# Patient Record
Sex: FEMALE | Race: WHITE | NOT HISPANIC OR LATINO | Employment: FULL TIME | URBAN - METROPOLITAN AREA
[De-identification: names, ages, dates, MRNs, and addresses within clinical notes are randomized per-mention and may not be internally consistent; named-entity substitution may affect disease eponyms.]

---
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
## 2020-01-06 ENCOUNTER — COMMUNICATION - HEALTHEAST (OUTPATIENT)
Dept: PEDIATRICS | Facility: CLINIC | Age: 20
End: 2020-01-06

## 2020-01-06 DIAGNOSIS — J45.901 ASTHMA EXACERBATION: ICD-10-CM

## 2020-11-16 ENCOUNTER — HEALTH MAINTENANCE LETTER (OUTPATIENT)
Age: 20
End: 2020-11-16

## 2021-05-24 ENCOUNTER — RECORDS - HEALTHEAST (OUTPATIENT)
Dept: ADMINISTRATIVE | Facility: CLINIC | Age: 21
End: 2021-05-24

## 2021-05-25 ENCOUNTER — RECORDS - HEALTHEAST (OUTPATIENT)
Dept: ADMINISTRATIVE | Facility: CLINIC | Age: 21
End: 2021-05-25

## 2021-05-29 NOTE — TELEPHONE ENCOUNTER
On Call BENI    Spoke with Dr Madison as patient's primary provider is Dr Nair--requesting her input for a safe antibiotic for Karen due to her history of chronic kidney disease. Reviewed UC result, recommended antibiotic options as outlined by the lab, patient's renal conditions, and creatinine result (1.10) in 8/2018.  She recommends cautious use of Levaquin and recommends I send a message to Dr Nair to follow up tomorrow.    As I Reviewed the chart further I noted hypermobility, conversion disorder, history of bilateral weakness and called Dr Madison back to discuss further as I have concerns with the prescribing Levaquin.  She recommends calling pharmacy to discuss.    I spoke with Jake Cade at the [facility] pharmacy.  Reviewed Karen's problem list/chart, UC results and medication recommendations.  He noted that the isolated bacteria is also susceptible to amoxicillin.  He recommends Augmentin 500 mg PO two times a day x 5 days.  He suggests that this medication should not cause an issue for her.  Also suggests follow up and repeating creatinine in the near future.    Rx ordered and will forward this message along to Dr Nair for her review.    I attempted to discuss the above with the patient, no answer via telephone.  Will send MyChart message    ABNER Lagunas CNM

## 2021-05-29 NOTE — PROGRESS NOTES
S: Karen presents for a placement check of her IUD. Her IUD was placed on 2019.  She has been busy with school and has not had time to come in until today.  She reports having tried to feel the strings without success.  Discussed checking the strings monthly and discussed positions that make reaching the strings a bit easier.  She has had spotting since her last period off and on but is not bothered by the amount of bleeding or length of time.  Reassurance provided that this is normal and will likely taper off.     Karen also feels she may have a UTI.  She has had   cloudy urine and incomplete bladder emptying sensations x 1/5 weeks. Denies dysuria and/or hematuria.  She had similar sx 6 months ago and received antibx from a walk in clinic.     O:/66 (Patient Site: Right Arm, Patient Position: Sitting, Cuff Size: Adult Regular)   Pulse 98   Wt 118 lb (53.5 kg)   SpO2 99%   BMI 19.64 kg/m       Patient appears well, in no apparent distress.       Pelvic exam: Vulva without lesions, erythema, or open areas.  Vaginal mucosa pink and rugated.  Cervix pink and smooth without polyps.  Able to visualize IUD strings that appear to be 2.5 cm in length. No abnormal vaginal discharge or foul odor present. No pelvic masses or CMT on bimanual exam.      A:  17 yo  here for IUD check  IUD in proper place  Sensation of incomplete bladder emptying     P:   -Check IUD strings at a designated time each month.  If unable to palpate strings patient to call for a CNM appointment   -UA  -UC  -Follow up prn or in approximately 6 months for annual well woman exam and prn     Total time spent with patient 20 minutes.  >50% of the time spent counseling and coordinating care.     Volodymyr LEVINE, BENI

## 2021-05-29 NOTE — TELEPHONE ENCOUNTER
Left message for patient to call back, offered appt with Dr. Nair 06/27 or Dr. Watson 06/28, both has openings available.

## 2021-05-30 VITALS — BODY MASS INDEX: 19.52 KG/M2 | WEIGHT: 115.52 LBS

## 2021-05-30 VITALS — HEIGHT: 65 IN | BODY MASS INDEX: 19.16 KG/M2 | WEIGHT: 115 LBS

## 2021-05-30 NOTE — TELEPHONE ENCOUNTER
Spoke with patient, relayed message, patient is scheduled to follow up with Dr. Nair 06/27 at 10:15am.

## 2021-05-30 NOTE — PROGRESS NOTES
ASSESSMENT:  1. Acute cystitis without hematuria             PLAN:  Patient Instructions   Finish antibiotic as prescribed    Probiotic may be helpful of you develop diarrhea from the antibiotic      Keep follow up with nephrology    Physical due in January        Return in about 6 months (around 12/27/2019), or if symptoms worsen or fail to improve, for Annual physical.    CHIEF COMPLAINT:  Chief Complaint   Patient presents with     Urinary Tract Infection     Follow up        HISTORY OF PRESENT ILLNESS:  Shelbi is a 18 y.o. female presenting to the clinic today evaluation post urinary tract infection.  She was last seen by [facility] 6/20/2019 for a placement check of her IUD and symptoms of possible urinary tract infection. She was prescribed 500 mg Augmentin 6/23/2019, to be taken twice daily for 5 days.     Urinary tract infection: She was seen by [facility] 6/20/2019. While there she reported sensation of incomplete bladder emptying and cloudy urine. She denies increased frequency or dysuria. Urine culture was positive for Staph saprophyticus. Today she endorses her symptoms are improving. She denies adverse effects from the antibiotics. She had a urinary tract infection approximately 6 months ago. She is scheduled to see nephrology over this summer.     REVIEW OF SYSTEMS:   She fractured her left hand while at work 5/28/2019 and got casted last week. She was seen at [facility] in [place].   Negative for fever, abdominal pain, emesis, and diarrhea.   All other systems are negative.    MEDICATIONS:  Current Outpatient Medications   Medication Sig Dispense Refill     albuterol (PROAIR HFA;PROVENTIL HFA;VENTOLIN HFA) 90 mcg/actuation inhaler Inhale 2 puffs every 6 (six) hours as needed for wheezing. 1 Inhaler 0     amoxicillin-clavulanate (AUGMENTIN) 500-125 mg per tablet Take 1 tablet (500 mg total) by mouth 2 (two) times a day for 5 days. 10 tablet 0     ascorbic acid  "(VITAMIN C) 250 MG tablet Take 250 mg by mouth daily.       cholecalciferol, vitamin D3, 1,000 unit tablet Take 1,000 Units by mouth daily.       FLUoxetine (PROZAC) 10 MG capsule TAKE 3 CAPSULES BY MOUTH ONCE DAILY 270 capsule 0     FLUoxetine (PROZAC) 40 MG capsule Take by mouth daily.  0     hydrOXYzine HCl (ATARAX) 10 MG tablet Take 10 mg by mouth daily as needed.  0     inhalational spacing device Spcr daily as needed. With inhaler       inulin (FIBER GUMMIES) 2.5 gram Chew Chew 2 tablets daily.  0     levonorgestrel (MIRENA) 20 mcg/24 hours (5 yrs) 52 mg IUD 1 each by Intrauterine route.       pediatric multivitamin (FLINTSTONES) Chew chewable tablet Chew 1 tablet daily.       riboflavin, vitamin B2, 100 mg Tab Take 100 mg by mouth daily.       No current facility-administered medications for this visit.          PFSH:  She just finished her first year of college at [employer]. She was on the prakash's list both semesters. She is taking two online classes over the summer. She had to switch roommates in the middle of the year and is happy with the change.   She is sexually active with a male partner and endorses using condoms. She denies any new sexual partners recently.     History of conversion disorder with recurrent episodes of paralysis, seizure-like activity. Has been doing very well, with no such symptoms at all lately.     Past Medical History:   Diagnosis Date     Depression      Exercise-induced asthma      Past Surgical History:   Procedure Laterality Date     KNEE ARTHROSCOPY W/ PLICA EXCISION Right 11/15/2013    Right lateral release and removal of plica, [facility], [place], [place]     TONSILLECTOMY Bilateral 07/19/2017         VITALS:  Vitals:    06/27/19 1018   BP: 108/68   Patient Site: Right Arm   Patient Position: Sitting   Cuff Size: Adult Small   Pulse: 82   Temp: 98.5  F (36.9  C)   TempSrc: Oral   SpO2: 99%   Weight: 119 lb 14.4 oz (54.4 kg)   Height: 5' 5\" (1.651 m)     Wt " Readings from Last 3 Encounters:   06/27/19 119 lb 14.4 oz (54.4 kg) (37 %, Z= -0.33)*   06/20/19 118 lb (53.5 kg) (33 %, Z= -0.44)*   01/21/19 114 lb (51.7 kg) (26 %, Z= -0.63)*     * Growth percentiles are based on [facility] (Girls, 2-20 Years) data.     Body mass index is 19.95 kg/m .    PHYSICAL EXAM:  General Appearance: Alert and no distress, appears stated age. Left arm thumb spica cast.   Head: Normocephalic, without obvious abnormality, atraumatic  Skin: Skin color, texture, turgor normal, no rashes or lesions  Neurologic:  Grossly normal    Results for orders placed or performed in visit on 06/20/19   Culture, Urine   Result Value Ref Range    Culture >100,000 col/ml Staphylococcus saprophyticus (!)    Urinalysis   Result Value Ref Range    Color, UA Yellow Colorless, Yellow, Straw, Light Yellow    Clarity, UA Clear Clear    Glucose, UA Negative Negative    Bilirubin, UA Negative Negative    Ketones, UA Negative Negative    Specific Gravity, UA 1.015 1.005 - 1.030    Blood, UA Moderate (!) Negative    pH, UA 6.0 5.0 - 8.0    Protein,  mg/dL (!) Negative mg/dL    Urobilinogen, UA 0.2 E.U./dL 0.2 E.U./dL, 1.0 E.U./dL    Nitrite, UA Negative Negative    Leukocytes, UA Small (!) Negative    Bacteria, UA Few (!) None Seen hpf    RBC, UA 5-10 (!) None Seen, 0-2 hpf    WBC, UA 5-10 (!) None Seen, 0-5 hpf    Squam Epithel, UA 0-5 None Seen, 0-5 lpf       ADDITIONAL HISTORY SUMMARIZED (2): [facility] and UTI symptoms reviewed.  DECISION TO OBTAIN EXTRA INFORMATION (1): None.   RADIOLOGY TESTS (1): None.  LABS (1): 6/20/2019 labs reviewed.  MEDICINE TESTS (1): None.  INDEPENDENT REVIEW (2 each): None.     Total data points: 3    The visit lasted a total of 10 minutes face to face with the patient. Over 50% of the time was spent counseling and educating the patient about urinary tract infection.    IApoorva am scribing for and in the presence of, Dr. Nair.    I,  Apoorva Nair, personally performed the services described in this documentation, as scribed by Apoorva Ralph in my presence, and it is both accurate and complete.

## 2021-05-30 NOTE — PATIENT INSTRUCTIONS - HE
Finish antibiotic as prescribed    Probiotic may be helpful of you develop diarrhea from the antibiotic      Keep follow up with nephrology    Physical due in January

## 2021-05-31 VITALS — WEIGHT: 115 LBS | HEIGHT: 65 IN | BODY MASS INDEX: 19.16 KG/M2

## 2021-05-31 VITALS — BODY MASS INDEX: 18.7 KG/M2 | HEIGHT: 65 IN | WEIGHT: 112.21 LBS

## 2021-05-31 VITALS — WEIGHT: 109.8 LBS | BODY MASS INDEX: 18.56 KG/M2

## 2021-06-02 VITALS — WEIGHT: 116 LBS | HEIGHT: 65 IN | BODY MASS INDEX: 19.33 KG/M2

## 2021-06-02 VITALS — BODY MASS INDEX: 18.97 KG/M2 | WEIGHT: 114 LBS

## 2021-06-02 VITALS — BODY MASS INDEX: 19.03 KG/M2 | WEIGHT: 114.2 LBS | HEIGHT: 65 IN

## 2021-06-02 NOTE — PROGRESS NOTES
"ASSESSMENT:  1. Convulsions, unspecified convulsion type (H)     2. Routine adult health maintenance  multivitamin (ONE A DAY) per tablet             PLAN:  Patient Instructions   Keep follow up with counselor and neurologist    Continue same meds    Call if symptoms worsen or recur.         Return in about 2 months (around 1/1/2020) for annual well child check.    CHIEF COMPLAINT:  Chief Complaint   Patient presents with     Follow-up     she is feeling better \"slowly\" but has been sleeping alot. last monday she got sick and has been very fatigued.       HISTORY OF PRESENT ILLNESS:  Shelbi is a 19 y.o. female presenting to the clinic today for follow up evaluation post ED visit yesterday. She is accompanied in clinic today by her mom.     She started to feel sick 5 days ago when she presented with chills. She thinks she had a fever but did not have a thermometer to measure her temperature. Additionally, she endorses pharyngitis, cough, headache, dizziness, and nausea. She went to the bathroom, 4 days ago, thinking she was going to vomit but instead lost consciousness. Once she lost consciousness, she had a seizure. Then, two days ago, she had a total of 8 seizures over the course of 12 hours. Each seizure was similar to previous seizures she had before. Last night, she had a seizure during the ambulance ride to [facility]. She was seen in the ED yesterday. She is unsure how many seizures she had yesterday. They reported to her that her seizures were lasting 10-15 seconds each with \"a lot of muscle movement.\" While in the ED, she received IV fluids, ativan, and decadron.     Today, she denies seizures and endorses feeling much better. When she was experiencing vertigo, it camron as if she was \"walking sideways.\" Now, she reports normal vision and denies dizziness. She feels as if her ears are plugged. She cannot remember the last time she was diagnosed with otitis media.    Mood: She currently takes " 60 mg fluoxetine daily. Additionally, she takes 10 mg hydroxyzine in the morning on weekdays. She denies drowsiness. Dr. Bunn at [facility] is prescribed these. She reports school is going well overall. She is currently stressed about one accounting class. She is not performing well in the course. She has a D and needs a C to pass. The course is intermediate accounting and a prerequisite for other courses that she needs take. She is taking steps to improve her grade.     She was last seen by the neurologist July 2018. She has not had trouble with migraines since starting B12. She adjusts her B12 dosage as needed for when she experiences migraines.     REVIEW OF SYSTEMS:   Negative for difficulty sleeping. She reports an average of 8-10 hours of sleep a night.   All other systems are negative.    MEDICATIONS:  Current Outpatient Medications   Medication Sig Dispense Refill     albuterol (PROAIR HFA;PROVENTIL HFA;VENTOLIN HFA) 90 mcg/actuation inhaler Inhale 2 puffs every 6 (six) hours as needed for wheezing. 1 Inhaler 0     ascorbic acid (VITAMIN C) 250 MG tablet Take 250 mg by mouth daily.       cholecalciferol, vitamin D3, 1,000 unit tablet Take 1,000 Units by mouth daily.       FLUoxetine (PROZAC) 20 MG capsule Take 60 mg by mouth.       FLUoxetine (PROZAC) 40 MG capsule Take by mouth daily.  0     hydrOXYzine HCl (ATARAX) 10 MG tablet Take 10 mg by mouth daily as needed.  0     inulin (FIBER GUMMIES) 2.5 gram Chew Chew 2 tablets daily.  0     meclizine (ANTIVERT) 12.5 mg tablet Take 25 mg by mouth.       riboflavin, vitamin B2, 100 mg Tab Take 100 mg by mouth daily.       inhalational spacing device Spcr daily as needed. With inhaler       levonorgestrel (MIRENA) 20 mcg/24 hours (5 yrs) 52 mg IUD 1 each by Intrauterine route.       multivitamin (ONE A DAY) per tablet Take 1 tablet by mouth daily.  0     No current facility-administered medications for this visit.          PFSH:  She lives with one roommate  "in a dorm at school. She enjoys college and is studying accounting. She does not plan on living with a roommate next year.     Past Medical History:   Diagnosis Date     Depression      Exercise-induced asthma      Past Surgical History:   Procedure Laterality Date     KNEE ARTHROSCOPY W/ PLICA EXCISION Right 11/15/2013    Right lateral release and removal of plica, [facility], [place], [place]     TONSILLECTOMY Bilateral 07/19/2017         VITALS:  Vitals:    11/01/19 1407   BP: 112/70   Patient Site: Right Arm   Patient Position: Sitting   Cuff Size: Adult Regular   Weight: 127 lb 4.8 oz (57.7 kg)   Height: 5' 4.57\" (1.64 m)     Wt Readings from Last 3 Encounters:   11/01/19 127 lb 4.8 oz (57.7 kg) (50 %, Z= 0.01)*   06/27/19 119 lb 14.4 oz (54.4 kg) (37 %, Z= -0.33)*   06/20/19 118 lb (53.5 kg) (33 %, Z= -0.44)*     * Growth percentiles are based on CDC (Girls, 2-20 Years) data.     Body mass index is 21.47 kg/m .    PHYSICAL EXAM:  General Appearance: Alert and no distress, appears stated age.  Head: Normocephalic, without obvious abnormality, atraumatic  Eyes: PERRL, conjunctiva/corneas clear  Ears: Normal TM's and external ear canals, both ears  Nose: Nares normal. Slight nasal congestion.   Throat: Moist mucosa, post pharynx clear  Neck: Supple, no adenopathy  Lungs: Clear to auscultation bilaterally, no crackles or wheeze, no increased work of breathing  Heart: Regular rate and rhythm, S1 and S2 normal, no murmur, rub   or gallop  Abdomen: Soft, non tender, non distended   Skin: Skin color, texture, turgor normal, no rashes or lesions  Neurologic:  Grossly normal    QUALITY MEASURES:  The following are part of a depression follow up plan for the patient:  under care of mental health counselor and patient follow-up to return when and if necessary    ADDITIONAL HISTORY SUMMARIZED (2): 10/31/19 [facility] note regarding seizures reviewed.  DECISION TO OBTAIN EXTRA INFORMATION (1): None. "   RADIOLOGY TESTS (1): None.  LABS (1): 10/31/19 labs reviewed.  MEDICINE TESTS (1): None.  INDEPENDENT REVIEW (2 each): None.     Total data points: 3    The visit lasted a total of 23 minutes face to face with the patient. Over 50% of the time was spent counseling and educating the patient about recent hospital visit and seizures.    IApoorva am scribing for and in the presence of, Dr. Nair.    I, Apoorva Nair, personally performed the services described in this documentation, as scribed by Apoorva Ralph in my presence, and it is both accurate and complete.

## 2021-06-03 VITALS
DIASTOLIC BLOOD PRESSURE: 70 MMHG | BODY MASS INDEX: 21.21 KG/M2 | WEIGHT: 127.3 LBS | SYSTOLIC BLOOD PRESSURE: 112 MMHG | HEIGHT: 65 IN

## 2021-06-03 VITALS — BODY MASS INDEX: 19.98 KG/M2 | HEIGHT: 65 IN | WEIGHT: 119.9 LBS

## 2021-06-03 VITALS — WEIGHT: 118 LBS | BODY MASS INDEX: 19.64 KG/M2

## 2021-06-03 NOTE — PATIENT INSTRUCTIONS - HE
Keep follow up with counselor and neurologist    Continue same meds    Call if symptoms worsen or recur.

## 2021-06-04 NOTE — TELEPHONE ENCOUNTER
Patient mother states they are going vacation out of country and leaving Wednesday morning requesting to process as soon as possible.  Refill Request  Did you contact pharmacy: No  Medication name:   Requested Prescriptions     Pending Prescriptions Disp Refills     albuterol (PROAIR HFA;PROVENTIL HFA;VENTOLIN HFA) 90 mcg/actuation inhaler 1 Inhaler 0     Sig: Inhale 2 puffs every 6 (six) hours as needed for wheezing.   Who prescribed the medication: Apoorva Nair MD  Pharmacy Name and Location: [facility] # [id number]  Is patient out of medication: Yes  Patient notified refills processed in 72 hours:  yes  Okay to leave a detailed message: no

## 2021-06-05 NOTE — TELEPHONE ENCOUNTER
Refill Given  Physical due in January  Return in about 6 months (around 12/27/2019), or if symptoms worsen or fail to improve, for Annual physical.    Refill given per Policy, patient informed they are overdue for Labs and Physical     Rose Rivera, [facility] Connection Triage/Med Refill 1/6/2020    Requested Prescriptions   Pending Prescriptions Disp Refills     albuterol (PROAIR HFA;PROVENTIL HFA;VENTOLIN HFA) 90 mcg/actuation inhaler 1 Inhaler 0     Sig: Inhale 2 puffs every 6 (six) hours as needed for wheezing.       Albuterol/Levalbuterol Refill Protocol Passed - 1/6/2020 10:28 AM        Passed - PCP or prescribing provider visit in last year     Last office visit with prescriber/PCP: 11/1/2019 Apoorva Nair MD OR same dept: 11/1/2019 Apoorva Nair MD OR same specialty: 11/1/2019 Apoorva Nair MD Last physical: 1/4/2019       Next appt within 3 mo: Visit date not found  Next physical within 3 mo: Visit date not found  Prescriber OR PCP: Apoorva Nair MD  Last diagnosis associated with med order: There are no diagnoses linked to this encounter.  If protocol passes may refill for 6 months if within 3 months of last provider visit (or a total of 9 months). If patient requesting >1 inhaler per month refill x 6 months and have patient make appointment with provider.

## 2021-06-10 NOTE — PROGRESS NOTES
Name: Shelbi Johnson  Age: 16 y.o.  Gender: female  : 2000  Date of Encounter: 5/3/2017      Chief Complaint   Patient presents with     Sore Throat     on and off for 5 weeks, Pt has been tested for strep and mono both are neg       HPI:  Shelbi Johnson is a 16 y.o. old female who presents to the clinic with mom for evaluation of sore throat  Sore throat for a few weeks  She gets bad sore throat several times per year.  Strep tests are usually negative.  Associated with fever to just over 100.2  She has been taking chewable tylenol for pain  Ears feel plugged.  New pain on right side of neck today  Seen at [facility] yesterday, strep negative    ROS:  No cough  No rhinorrhea  No headache  Some nausea  No vomiting    PMH:  Chronic kidney disease, followed by Dr. Joy    Social:  No known specific exposure    Objective:  Vitals: Temp 97.9  F (36.6  C) (Temporal)   Wt 115 lb 8.3 oz (52.4 kg)    Gen: Alert, awake, well appearing  Head: Normocephalic with age appropriate fontanelles.  Eyes: Red reflex present bilaterally. Pupils equally round and reactive to light. Conjunctivae and cornea clear  Ears: Right TM clear.  Left TM clear   Nose:  no rhinorrhea.  Throat:  Oropharynx clear.  Tonsils 3+ and erythematous with exudate bilaterally  Neck: Supple.  No adenopathy.  Heart: Regular rate and rhythm; normal S1 and S2; no murmurs, gallops, or rubs.  Lungs: Unlabored respirations; symmetric chest expansion; clear breath sounds.  Abdomen: Soft, without organomegaly. Bowel sounds normal. Nontender without rebound. No masses palpable. No distention.  Genitalia: deferred  Extremities: No clubbing, cyanosis, or edema. Normal upper and lower extremities.  Skin: Clear  Mental Status: Alert, oriented, in no distress. Appropriate for age.  Neuro: Normal reflexes; normal tone; no focal deficits appreciated. Appropriate for age.    Pertinent results / imaging:  Reviewed   Results for orders placed or performed in  visit on 05/03/17   Mononucleosis Screen   Result Value Ref Range    Mono Screen Positive (!) Negative       Assessment and Plan:    1. Infectious mononucleosis without complication, infectious mononucleosis due to unspecified organism  Mononucleosis Screen    Ambulatory referral to ENT       Patient Instructions   Call [phone] to schedule consultation with ENT for possible tonsillectomy.    Prednisolone as prescribed for symptom relief.    Tylenol on a scheduled basis every 6 hours.  Ice chips, sip of cool water.  Gargling with warm salt water as your throat feels slightly better should help soothe as well.    Call if unable to swallow liquids or saliva, severe pain not responding to above measure.                Naveen Murphy MD  5/3/2017

## 2021-06-11 NOTE — PROGRESS NOTES
ASSESSMENT & PLAN:  1. Preop examination    - Pregnancy (Beta-hCG, Qual), Urine  - Hemoglobin    2. Recurrent tonsillitis    - Pregnancy (Beta-hCG, Qual), Urine  - Hemoglobin    3. Dysmenorrhea    - Pregnancy (Beta-hCG, Qual), Urine  - Hemoglobin        Patient Instructions   I will fax your results to the surgery center and I will mail a copy to your home. Bring your copy to the surgery.     Start a new birth control pill after surgery. I will send to your pharmacy of choice.       Orders Placed This Encounter   Procedures     Pregnancy (Beta-hCG, Qual), Urine     Hemoglobin     There are no discontinued medications.    No Follow-up on file.    CHIEF COMPLAINT:  Chief Complaint   Patient presents with     Pre-op Exam     Tonsilectomy, 7/19, [facility] surgery center, Dr. Cool        HISTORY OF PRESENT ILLNESS:  Shelbi is a 16 y.o. female here for an internal medicine pre-operative consultation. The exam is requested by Dr. Cool in preparation for tonsillectomy  to be performed at [facility] on 7/19/2017. Today s examination on 06/29/17 is done to review the underlying surgical condition of tonsillitis, clear for anesthesia, and review medical problems with appropriate changes in medications.    She was seen by Dr. Cool on 5/24/2017 for an evaluation of her history of sore throat beginning over 10 years ago. She has been getting repeated episodes of tonsillitis that wasn't strep. She was diagnosed with Mono on 5/3/2017.      Shelbi has tolerated previous surgeries well without bleeding or anesthesia difficulty. She has had plica removal and releases. Maternal Grandmother and Mother have history of hypotension with anesthesia. She denies known exposure to measles and whooping cough.     Contraception: She notes that her cramps and headaches during her menstrual periods are worsening and she is inquiring about birth control to control her symptoms. Her first menstrual period was when she was 12-13 years  old. Her last LMP was 5/24/2017. She was previously taking Ortho-Cyclen but discontinued about 2 months later due to side effects of spotting and no improvement of her cramps.     REVIEW OF SYSTEMS:   She also notes a history of conversion disorder.  All other systems are negative.    PFSH:  She is watching How I Met your Mother and Switched at Birth on Netflix. She is reading [employer] books over the summer. She will be a senior at [employer]. She wants to go to [employer] for accounting.   History   Smoking Status     Never Smoker   Smokeless Tobacco     Not on file       Family History   Problem Relation Age of Onset     Depression Mother      Hyperlipidemia Mother      Osteoarthritis Mother      Other Mother      Spinal stenosis     No Medical Problems Father      No Medical Problems Brother      Other Maternal Grandmother      Spinal stenosis     Paget's disease of bone Maternal Grandmother      Cardiomyopathy Maternal Grandmother      viral     Colon cancer Maternal Grandmother      Unsure if mass was cancerous or not      Alzheimer's disease Maternal Grandfather      Hypertension Maternal Grandfather      Hyperlipidemia Maternal Grandfather      Breast cancer Paternal Grandmother      Stroke Paternal Grandfather        Past Surgical History:   Procedure Laterality Date     KNEE ARTHROSCOPY W/ PLICA EXCISION Right 2013       Allergies   Allergen Reactions     Nsaids (Non-Steroidal Anti-Inflammatory Drug)      Pt has kidney concerns, cannot take NSAIDs per pt       Active Ambulatory Problems     Diagnosis Date Noted     Renal Medullary Necrosis      Intermittent Asthma      Chronic Kidney Disease, Stage 2      Type IV Renal Tubular Acidosis      Hypermobility Syndrome      Fasciculations      Transient Tic Disorder      Acne      Nasal Passage Blockage (Stuffiness)      Allergic Reaction      Conversion disorder 12/30/2014     Dysmenorrhea 06/14/2016     Migraine headache without aura 06/17/2016  "    Resolved Ambulatory Problems     Diagnosis Date Noted     Chronic Renal Insufficiency      Vitamin D Deficiency      Allergy To Milk      Past Medical History:   Diagnosis Date     Chronic kidney disease      Depression      Exercise-induced asthma        VITALS:  Vitals:    06/27/17 1601   BP: 98/64   Pulse: 78   Weight: 115 lb (52.2 kg)   Height: 5' 4.75\" (1.645 m)     Wt Readings from Last 3 Encounters:   06/27/17 115 lb (52.2 kg) (36 %, Z= -0.36)*   05/24/17 115 lb (52.2 kg) (37 %, Z= -0.34)*   05/03/17 115 lb 8.3 oz (52.4 kg) (38 %, Z= -0.30)*     * Growth percentiles are based on CDC 2-20 Years data.     Body mass index is 19.29 kg/(m^2).    PHYSICAL EXAM:  Constitutional: She appears well-developed and well-nourished.   HEENT: Head: Normocephalic.    Right Ear: Tympanic membrane, external ear and canal normal.    Left Ear: Tympanic membrane, external ear and canal normal.    Nose: Nose normal.    Mouth/Throat: Mucous membranes are moist. Oropharynx is clear. +2 tonsils bilaterally.    Eyes: Conjunctivae and lids are normal. Pupils are equal, round, and reactive to light. Optic discs are sharp.   Neck: Neck supple. No tenderness is present.   Cardiovascular: Normal rate and regular rhythm. No murmur heard.  Pulses: Femoral pulses are 2+ bilaterally.   Pulmonary/Chest: Effort normal and breath sounds normal. There is normal air entry. Breast development is normal.    Abdominal: Soft. There is no hepatosplenomegaly. No inguinal hernia.   Musculoskeletal: Normal range of motion. Normal strength and tone. No abnormalities. Spine is straight. Normal duck walk.  Normal heel to toe walk.   : Deferred.   Neurological: She is alert. She has normal reflexes. Gait normal.   Psychiatric: She has a normal mood and affect. Her speech is normal and behavior is normal.  Skin: Clear. No rashes.     Results for orders placed or performed in visit on 06/27/17   Pregnancy (Beta-hCG, Qual), Urine   Result Value Ref Range    " Pregnancy Test, Urine Negative Negative    Specific Gravity, UA 1.005 1.001 - 1.030   Hemoglobin   Result Value Ref Range    Hemoglobin 14.3 12.0 - 16.0 g/dL         ADDITIONAL HISTORY SUMMARIZED (2):Reviewed Dr. Cool' note from 5/24/2017 regarding frequent sore throat. Reviewed Dr. Murphy's note from 5/3/2017 regarding mono.   DECISION TO OBTAIN EXTRA INFORMATION (1): None.   RADIOLOGY TESTS (1): None.  LABS (1): Labs ordered.   MEDICINE TESTS (1): None.  INDEPENDENT REVIEW (2 each): None.     The visit lasted a total of 23 minutes face to face with the patient. Over 50% of the time was spent counseling and educating the patient about pre-op.    I, Mel Randall, am scribing for and in the presence of, Dr. Nair.    I, Dr. Apoorva Nair, personally performed the services described in this documentation, as scribed by Mel Randall in my presence, and it is both accurate and complete.    MEDICATIONS:  Current Outpatient Prescriptions   Medication Sig Dispense Refill     albuterol (PROVENTIL HFA;VENTOLIN HFA) 90 mcg/actuation inhaler Inhale 2 puffs every 6 (six) hours as needed for wheezing.       ascorbic acid (VITAMIN C) 250 MG tablet Take 250 mg by mouth daily.       inhalational spacing device Spcr daily as needed. With inhaler       inulin (FIBER GUMMIES) 2.5 gram Chew Chew 2 tablets daily.  0     pediatric multivitamin (FLINTSTONES) Chew chewable tablet Chew 1 tablet daily.       levonorgestrel-ethinyl estradiol (ORSYTHIA) 0.1-20 mg-mcg per tablet Take 1 tablet by mouth daily. 28 tablet 3     No current facility-administered medications for this visit.        Total data points:3    Apoorva Nair MD

## 2021-06-13 NOTE — PROGRESS NOTES
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 Mark Gormley  Visit Diagnosis:     ICD-10-CM    1. Weakness of both lower extremities R29.898    2. Decreased functional mobility and endurance Z74.09    3. Poor tolerance for activity Z78.9          Assessment:   Patient returns for first PT follow up. She has been compliant with ex's given at evaluation. She displays slightly improved quad/hip flexor strength with increased heigh of SLR. Sit<>stand was initiated today; pt was able to stand using B UE for support and cues for LE engagement although was unable to achieve upright posture despite cues.   Patient is benefitting from skilled physical therapy and is making steady progress toward functional goals.  Patient is appropriate to continue with skilled physical therapy intervention, as indicated by initial plan of care.    Goal Status:  Pt. will be independent with home exercise program in : 2 weeks  Pt will: return to driving independently 7/7 days/week for 1 week in 12 weeks  Pt will: return to walking without AD without visible gait pattern to return to normal school function in 12 weeks  Pt will: ascend/descend 1 flight of stairs without HR with reciprocal pattern 7/7 days/week for 1 week to ascend stairs in her home in 12 weeks  Pt will: perform all transfers independently without assistance or cues to return to normal daily activities in 12 weeks    Plan / Patient Education:     Continue with initial plan of care.  Progress with home program as tolerated.    Subjective:   Patient reports she had a \"long weekend\". She went to her high school football game but was unable to watch the game due to her view being blocked by other students. She is accompanied by her father to appt today and presents in .   She has not been able to " contact [facility] regarding inpatient program. She will be seeing psychology on Wednesday. Pt also mentions her parents have started a 504 plan for her in school.    Objective:     CGA sit<>stand, pt able to use B UE on //. Cues for weight shift forward and using LE to push to stand with slow eccentric lowering back into chair.   Pt independent in transfer from <>Nu-step.     Treatment Today     TREATMENT MINUTES COMMENTS   Evaluation     Self-care/ Home management     Manual therapy     Neuromuscular Re-education     Therapeutic Activity     Therapeutic Exercises 23 Nu-step WL 5, 5:00 total.    Reviewed and advanced HEP:  Exercise #2: 4 way SLR: supine, sidelying abd and add, prone glut ext  Comment #2: HEP x 10 reps B  Exercise #3: Ab set with breathing  Comment #3: HEP x 10 seconds x 15-20 rep  Exercise #4: Bridges  Comment #4: HEP x 5 seconds x 10 reps  Exercise #5: Clamshell  Comment #5: HEP to fatigue B       Gait training 5 ambulation in // with B UE support x 10 feet x 3 reps, cues for posture and reciprocal walking pattern.    Modality__________________                Total 28    Blank areas are intentional and mean the treatment did not include these items.       Talya Mcneal  10/16/2017

## 2021-06-13 NOTE — PROGRESS NOTES
"[facility]   Balance Initial Evaluation    Patient Name: Shelbi Johnson  Date of evaluation: 10/13/2017  Referral Diagnosis: Somatoform disorder   Referring provider: Apoorva Nair MD Mark Gormley, MD, [facility]  Visit Diagnosis:     ICD-10-CM    1. Weakness of both lower extremities R29.898    2. Decreased functional mobility and endurance Z74.09    3. Poor tolerance for activity Z78.9        Assessment:   Shelbi Johnson is a 17 y.o. female who presents to therapy today with chief complaints of bilateral lower extremity weakness and paresthesias since October 7th 2017. Patient states she was at a friends house when she suddenly felt \"paralyzed\", as she was unable to stand and walk. Patient has a previous history of conversion disorder in 2014, treated with outpatient physical therapy and eventually an inpatient rehabilitation at [facility]. Patient presents today in a wheelchair and is unable to stand and walk independently. MMT does reveal moderate strength in lower extremities, however strength is incosistent. Sensation testing does not follow dermatomal pattern. Patient was previously active and independent in all transfers, ambulation, ADL's. Pt is currently a high school student using a wheelchair for transportation. She will benefit from skilled 1:1 PT services to improve strength, transfers and return to independent and normal age related activities.     Goals:  Pt. will be independent with home exercise program in : 2 weeks  Pt will: return to driving independently 7/7 days/week for 1 week in 12 weeks  Pt will: return to walking without AD without visible gait pattern to return to normal school function in 12 weeks  Pt will: ascend/descend 1 flight of stairs without HR with reciprocal pattern 7/7 days/week for 1 week to ascend stairs in her home in 12 weeks  Pt will: perform all transfers independently without assistance or cues to return to normal daily " "activities in 12 weeks    Patient's expectations/goals are realistic.    Barriers to Learning or Achieving Goals:  Mental illness or emotional factors.  previous history of conversion disorder, hx of anxiety and depression may limit overall progress.       Plan / Patient Instructions:        Plan of Care:   Authorization / Certification Start Date: 10/13/17  Authorization / Certification Number of Visits: up to 12-16  visits   Communication with: Referral Source  Patient Related Instruction: Nature of Condition;Treatment plan and rationale;Self Care instruction;Basis of treatment  Times per Week: 2-3x/week per MD  Number of Weeks: up to 12 weeks  Number of Visits: up to 12-16 visits   Therapeutic Exercise: ROM;Stretching;Strengthening  Neuromuscular Reeducation: kinesio tape;posture;balance/proprioception;TNE  Manual Therapy: soft tissue mobilization;myofascial release;joint mobilization;muscle energy  Other Plan #1: Therapeutic activity     Plan for next visit: initiate core strengthening, progress as able. Consider nu-step or bike next session.     Subjective:        Karen Johnson is a 18 y/o female who presents today with chief c/o bilateral lower extremity weakness and paresthesias since October 7th 2017. Patient states she was at a friends house when she suddenly felt \"paralyzed\", as she was unable to stand and walk. Her mother picked her up from her friends house and rented a wheelchair to transport her. Patient's mother attends appointment today. Patient went to [facility] and eventually was seen at [facility] physical medicine and was referred to physical therapy. Pt will also be seeing psychology next week.    Patient has a previous history of conversion disorder in 2014, treated with outpatient physical therapy and eventually an inpatient rehabilitation at [facility]. Patient was admitted for approximately 2 weeks while during inpatient. Pt reports she continued experiencing " "paralysis symptoms off/on for 1 year after she was discharged from [place]. She is unable to recall any specific triggers for her symptoms. She states she does not feel \"stressed\", but wonders if she is nervous about attending college at [employer] next year.     Patient states she is unable to move her legs and has to lift her legs for tranfers. She reports she has no feeling except for \"pressure\" in her legs and tingling in the bottom of her feet. She is occasionally able to wiggle her toes.      Patient goals: \"regain mobility in my legs\", walking, return driving     Diagnosis: Somatoform disorder   Date of diagnosis: 10/9/17  Past medical history/precautions: previous Hx of conversion disorder, anxiety, currently on anti-depressant, 1 panic attack last year but none since    Living Situation: pt lives with her mother, father, brother. She is a senior in high school, has a boyfriend.   Equipment: owns [employer], renting [employer]  Prior Functional Status: previously following an exercise program, no AD, no sporting activities   Current Activity Level: not currently exercising     Fall history: none     Pain  Pain Ratin  Pain rating at best: 0  Pain rating at worst: 0     Objective:      Note: Items left blank indicates the item was not performed or not indicated at the time of the evaluation.    Balance Examination  1. Weakness of both lower extremities     2. Decreased functional mobility and endurance     3. Poor tolerance for activity       Involved side: Bilateral    Posture Observation: PPT, presents in wheelchair   Assistive Device: presents in a wheelchair    Transitional movements:          Sit?Stand:  Modified Independent uses B UE for support to stand, unable to stand without UE support   W/C?Mat/Bed: Modified Independent patient lifts B LE with her hands  Sit? Supine:  Independent    Supine? Sit:   Independent   W/C? Car:  Modified Independent    Floor? Stand:  Not Tested     Strength    " "Strength   L / R ROM   L / R Comments   Seated Hip Flexion 4+/4+ WNL      Knee Extension 4/4 WNL Able to hold contraction; B LE shaking present    Dorsi Flexion 1/1 WNL Palpable mm contraction, no active movement                 Supine Straight Leg Raise (degrees) Pt performs SLR with extensor lag present, lifts 4\" from table surface      Quad activation Moderate quad activation present, improves with more repetitions             Side lying Hip Abduction 4/4      Hip Adduction NT            Prone Hip Extension 4-/4-      Knee Flexion 3+/4            Standing Plantar Flexion*       Dorsi Flexion        *Standing PF (single heel raise with UE support for balance only):  5= 16-20 heel raises  4= 10-15 heel raises  3= 5-9 heel raises  2= 1-4 heel raises      Sensation testing:  Sharp sensation testing B LE dermatomes:  L1: absent B  L2: WNL B  L3: L absent, R WNL  L4: L absent, R WNL  L5: R absent, L WNL      Timed Up and Go (TUG): NT  TUG Motor:  seconds  TUG Cognitive:  seconds  Task:     30 second sit<>stand:  reps  UE support?   Age gender norm:     Balance:    Firm Surface (seconds) Unstable Surface (seconds)    EO EC EO EC   Romberg (feet together)       Sharpened Romberg (tandem)       Semi-Romberg (small step)           Gait observation:  Patient ambulates in // with B UE support and CGA from therapist. Pt able to ambulate with approximately 50% WB on B LE, advancing limbs independently, B foot internally rotated slightly. Pt states \"I feel like I'm going to fall\" at the end of 20 feet.           Treatment Today     TREATMENT MINUTES COMMENTS   Evaluation 30 LE evaluation.    Self-care/ Home management     Manual therapy     Neuromuscular Re-education     Therapeutic Activity     Therapeutic Exercises 25 Initiated HEP:    Exercises:  Exercise #1: Isometrics: To be performed hourly, sitting or supine: glut set, quad set, abd, add in hooklying  Comment #1: HEP x 5 seconds x 10 reps  Exercise #2: 4 way SLR: supine, " sidelying abd and add, prone glut ext  Comment #2: HEP x 10 reps B    Discussed importance of HEP compliance- educated on how inactivity relates to weakness. Discussed goals of physical therapy to return to full strength and mobility. Educated on role of psychology for treatment of conversion disorder.    Gait training     Modality__________________                Total 55    Blank areas are intentional and mean the treatment did not include these items.     PT Evaluation Code: (Please list factors)  Patient History/Comorbidities: CKD stage II, hypermobility syndrome, anxiety  Examination: see above  Clinical Presentation: unstable  Clinical Decision Making: moderate    Patient History/  Comorbidities Examination  (body structures and functions, activity limitations, and/or participation restrictions) Clinical Presentation Clinical Decision Making (Complexity)   No documented Comorbidities or personal factors 1-2 Elements Stable and/or uncomplicated Low   1-2 documented comorbidities or personal factor 3 Elements Evolving clinical presentation with changing characteristics Moderate   3-4 documented comorbidities or personal factors 4 or more Unstable and unpredictable High Talya Mcneal  10/13/2017  7:53 AM

## 2021-06-13 NOTE — PROGRESS NOTES
[facility] Daily Progress     Patient Name: Shelbi Johnson  Date: 10/18/2017  Visit #: 3/12-16 visits - [facility]  [facility] visit #:    Referral Diagnosis: Somatoform disorder   Referring provider: Apoorva Nair MD Dr. Mark Gormley  Visit Diagnosis:     ICD-10-CM    1. Weakness of both lower extremities R29.898    2. Decreased functional mobility and endurance Z74.09    3. Poor tolerance for activity Z78.9          Assessment:   Patient is making progress towards goals- she was able to ambulate today using standard walker up for >60 feet before requesting seated rest break due to UE fatigued. Encouraged patient and her mother to have patient walk at home and perform all possible ADL's independently. Patient's mother expresses concern her daughter will fall. Discussed patient's balance and posture reactions are intact and patient should be able to correct her balance independently when needed.   Patient is benefitting from skilled physical therapy and is making steady progress toward functional goals.  Patient is appropriate to continue with skilled physical therapy intervention, as indicated by initial plan of care.    Goal Status:  Pt. will be independent with home exercise program in : 2 weeks  Pt will: return to driving independently 7/7 days/week for 1 week in 12 weeks  Pt will: return to walking without AD without visible gait pattern to return to normal school function in 12 weeks  Pt will: ascend/descend 1 flight of stairs without HR with reciprocal pattern 7/7 days/week for 1 week to ascend stairs in her home in 12 weeks  Pt will: perform all transfers independently without assistance or cues to return to normal daily activities in 12 weeks    Plan / Patient Education:     Continue with initial plan of care.  Progress with home program as tolerated.    Subjective:   Patient reports she found a walker at home and walked approximately 16 feet. Pt started psychology today; NABILA was given for  "communication between psychologist and the author of this note.     Objective:     Ambulates today with standard walker, decreased step length- unable to pass stance foot with swing LE. Pt using approximately 50% body weight through UE on walker. Cued patient for heel strike and increasing stride length which improved gait speed slightly.   Patient's mother requests to follow patient with WC.      Treatment Today     TREATMENT MINUTES COMMENTS   Evaluation     Self-care/ Home management     Manual therapy     Neuromuscular Re-education     Therapeutic Activity     Therapeutic Exercises 15 Nu-step WL 5, 5:00 total.    Performed the following in // with B UE support:  Standing hip abduction x 10 reps B  Standing heel raises with minimal height x 10 B  Toe taps on 3\" step x 10 reps B, alternating.   Pt fatigues following these 3 exercises, stating \"my legs are so tired and shaky\".   Cues for abdominal activation provided throughout.        Gait training 15 Ambulation with standard walker x 44.3 feet with 2 minute rest due to c/o UE fatigue. Followed with ambulation for additional 62.7 feet.      Modality__________________                Total 30    Blank areas are intentional and mean the treatment did not include these items.       Talya Mcneal  10/18/2017  "

## 2021-06-13 NOTE — PROGRESS NOTES
"[facility] Daily Progress     Patient Name: Shelbi Johnson  Date: 11/1/2017  Visit #: 9/12-16 visits - [facility] visit #:    Referral Diagnosis: Somatoform disorder   Referring provider: Apoorva Nair MD Dr. Mark Gormley  Visit Diagnosis:     ICD-10-CM    1. Decreased functional mobility and endurance Z74.09    2. Weakness of both lower extremities R29.898    3. Poor tolerance for activity Z78.9          Assessment:   Patient's ambulation and overall function drastically improved throughout session today. Pt presented in WC and was able to ambulate without gait deviation throughout most of session. Although this does demonstrate progress, patient's progress is still very inconsistent from day to day. Patient displays improved affect today; overall less c/o feeling \"tired\".   Patient is benefitting from skilled physical therapy and is making steady progress toward functional goals.  Patient is appropriate to continue with skilled physical therapy intervention, as indicated by initial plan of care.    Goal Status:  Pt. will be independent with home exercise program in : 2 weeks  Pt will: return to driving independently 7/7 days/week for 1 week in 12 weeks  Pt will: return to walking without AD without visible gait pattern to return to normal school function in 12 weeks  Pt will: ascend/descend 1 flight of stairs without HR with reciprocal pattern 7/7 days/week for 1 week to ascend stairs in her home in 12 weeks  Pt will: perform all transfers independently without assistance or cues to return to normal daily activities in 12 weeks    Plan / Patient Education:     Continue with initial plan of care.  Progress with home program as tolerated.    Subjective:   Patient reports no significant changes since last visit; walking is about the \"same\". Patient and mother express frustration Filipp[facility] has not returned phone calls regarding potential inpatient stay.    Objective:     Pt presents today in WC. " Leaves appointment today with normal gait pattern. Pt able to ambulate with normal gait pattern throughout entire session after using Nu-step for warm up.     Treatment Today     TREATMENT MINUTES COMMENTS   Evaluation     Self-care/ Home management     Manual therapy     Neuromuscular Re-education     Therapeutic Activity     Therapeutic Exercises 25 Nu-step WL 1, LE only x 7:00 total.     Amb drills on white 20 foot line:   - high knee steps x 20 feet x 2 reps F/B, cues for driving knee and increasing ankle DF, with reciprocal arm swing F/B  - cross overs x 20 feet x 2 reps F/B    Sit<> stand with B UE support x 10 reps, cues for glut activation and posture upon standing; good demo today.       Gait training 10     Treadmill walking without UE support:  1.5 mph x 2:00 warm up comfortable speed  1.8 mph x 1:00   2.0 mph x 5:00 with head turns up/down, right/left without UE support      Modality__________________                Total 35    Blank areas are intentional and mean the treatment did not include these items.       Talya Mcneal  11/1/2017

## 2021-06-13 NOTE — PROGRESS NOTES
"[facility] Daily Progress     Patient Name: Shelbi Johnson  Date: 10/30/2017  Visit #: 8/12-16 visits - [facility] visit #:    Referral Diagnosis: Somatoform disorder   Referring provider: Apoorva Nair MD Dr. Mark Gormley  Visit Diagnosis:     ICD-10-CM    1. Decreased functional mobility and endurance Z74.09    2. Weakness of both lower extremities R29.898    3. Poor tolerance for activity Z78.9          Assessment:   Patient's progress has slowed since last session. Visual imagery was used today to assist with obtaining a normal gait pattern; imagery appeared to increase step length and foot clearance, so this was given as HEP.   Patient is benefitting from skilled physical therapy and is making steady progress toward functional goals.  Patient is appropriate to continue with skilled physical therapy intervention, as indicated by initial plan of care.    Goal Status:  Pt. will be independent with home exercise program in : 2 weeks  Pt will: return to driving independently 7/7 days/week for 1 week in 12 weeks  Pt will: return to walking without AD without visible gait pattern to return to normal school function in 12 weeks  Pt will: ascend/descend 1 flight of stairs without HR with reciprocal pattern 7/7 days/week for 1 week to ascend stairs in her home in 12 weeks  Pt will: perform all transfers independently without assistance or cues to return to normal daily activities in 12 weeks    Plan / Patient Education:     Continue with initial plan of care.  Progress with home program as tolerated.    Subjective:   Patient and her mother report no significant changes since last visit; pt c/o feeling very tired today and feels her legs are \"buckling\" with walking.   Pt was not able to obtain a normal gait pattern over the weekend.     Objective:     Pt presents today in WC. Improved foot clearance and step length noted today with use of visual imagery.     Treatment Today     TREATMENT MINUTES " COMMENTS   Evaluation     Self-care/ Home management     Manual therapy     Neuromuscular Re-education     Therapeutic Activity     Therapeutic Exercises 20 Nu-step WL 5, 5:00 total.    Amb drills in // for active warm up:  - high knee steps x 10 feet x 2 reps F/B, cues for driving knee and increasing ankle DF  - side steps x 10 feet x 2 reps  - cross overs x 10 feet x 2 reps    Sit<> stand with B UE support x 10 reps, cues for glut activation and posture upon standing        Gait training 10 Ambulation with walker x 200 feet total before seated rest break requested.   Added visual imagery today- instructed pt to imagine herself walking with normal stride as visualization during ambulation.      Modality__________________                Total 30    Blank areas are intentional and mean the treatment did not include these items.       Talya Mcneal  10/30/2017

## 2021-06-13 NOTE — PROGRESS NOTES
[facility] Daily Progress     Patient Name: Shelbi Johnson  Date: 10/27/2017  Visit #: 7/12-16 visits - [facility]  PTA visit #:    Referral Diagnosis: Somatoform disorder   Referring provider: Apoorva Nair MD Dr. Mark Gormley  Visit Diagnosis:     ICD-10-CM    1. Decreased functional mobility and endurance Z74.09    2. Weakness of both lower extremities R29.898    3. Poor tolerance for activity Z78.9          Assessment:   Patient's progress since last visit has been somewhat inconsistent. Pt was unable to display normal gait pattern such as in previous session; poor quad control, step length and posture observed today with decreased distance overall. Pt does report she increased dosage of depression medication which may help with Sx.   Patient is benefitting from skilled physical therapy and is making steady progress toward functional goals.  Patient is appropriate to continue with skilled physical therapy intervention, as indicated by initial plan of care.    Goal Status:  Pt. will be independent with home exercise program in : 2 weeks  Pt will: return to driving independently 7/7 days/week for 1 week in 12 weeks  Pt will: return to walking without AD without visible gait pattern to return to normal school function in 12 weeks  Pt will: ascend/descend 1 flight of stairs without HR with reciprocal pattern 7/7 days/week for 1 week to ascend stairs in her home in 12 weeks  Pt will: perform all transfers independently without assistance or cues to return to normal daily activities in 12 weeks    Plan / Patient Education:     Continue with initial plan of care.  Progress with home program as tolerated.    Subjective:   Patient c/o bilateral anterior knee pain today; was unable to use her walker much yesterday due to poor LE function.    Objective:     Pt presents today in WC. Improved amb with 4WW as pt progressed; poor step length and foot clearance with absence of terminal knee ext    Treatment Today      TREATMENT MINUTES COMMENTS   Evaluation     Self-care/ Home management     Manual therapy     Neuromuscular Re-education     Therapeutic Activity     Therapeutic Exercises 20 Nu-step WL 5, 5:00 total.    Standing marches x 10 reps  Standing hip abduction x 10 reps B  Heel/toe raises x 10 reps    Sit<> stand x 10 reps with cues for glut activation upon standing     Stability ball:   - seated marches x 20 reps  - seated knee extensions x 20 reps     Gait training 10 Ambulation with walker x 300 feet total; cues for heel strike, upright posture and increasing step length.      Modality__________________                Total 30    Blank areas are intentional and mean the treatment did not include these items.       Talya Mcneal  10/27/2017

## 2021-06-13 NOTE — PROGRESS NOTES
[facility] Daily Progress     Patient Name: Shelbi Johnson  Date: 10/23/2017  Visit #: 5/12-16 visits - [employer]  PTA visit #:    Referral Diagnosis: Somatoform disorder   Referring provider: Apoorva Nair MD Dr. Mark Gormley  Visit Diagnosis:     ICD-10-CM    1. Decreased functional mobility and endurance Z74.09    2. Weakness of both lower extremities R29.898    3. Poor tolerance for activity Z78.9          Assessment:   Patientcontinues to make progress with regards to both gait and strength. Gait pattern today was inconsistent however; pt is able to amb with standard walker with good foot clearance and heel strike without any visible gait deviation. When placed in // with UE support, she is displays poor quad control and foot clearance with overall instability. Patient's mother states today she notices the patient has been more depressed lately; suggested pt FU with PCP regarding anxiety and depression symptoms as this is likely an underlying factor in her somatoform disorder.   Patient is benefitting from skilled physical therapy and is making steady progress toward functional goals.  Patient is appropriate to continue with skilled physical therapy intervention, as indicated by initial plan of care.    Goal Status:  Pt. will be independent with home exercise program in : 2 weeks  Pt will: return to driving independently 7/7 days/week for 1 week in 12 weeks  Pt will: return to walking without AD without visible gait pattern to return to normal school function in 12 weeks  Pt will: ascend/descend 1 flight of stairs without HR with reciprocal pattern 7/7 days/week for 1 week to ascend stairs in her home in 12 weeks  Pt will: perform all transfers independently without assistance or cues to return to normal daily activities in 12 weeks    Plan / Patient Education:     Continue with initial plan of care.  Progress with home program as tolerated.    Subjective:   Patient returns to PT feeling tired  "again today- her mother accompanies her to visit. Patient's mother has noticed patient seems to be more depressed and pt is losing weight.   Discussed patient could schedule a FU with PCP regarding her depression medications. Pt is seeing psychology 1x/week.     Objective:     Ambulates today with standard walker, minimal UE support through walker with good step length and foot clearance. However when patient is placed in // with B UE support, she is unable to display normal heel strike with poor quad control B. Slightly improved with cues although non-consistent.     Patient bring [employer] crutches to visit today but feels she would like to use walker for support.       Treatment Today     TREATMENT MINUTES COMMENTS   Evaluation     Self-care/ Home management     Manual therapy     Neuromuscular Re-education 5 Sit<>stand from standard chair with B UE support. Cues for weight shift forward x 10 reps.     Scarves tossing on BOSU x 60 seconds x 2 reps.   No LOB.     Therapeutic Activity     Therapeutic Exercises 10 Nu-step WL 5, 5:00 total.      Performed the following in // with B UE support:  - Step up onto 3\" step x 20 reps (10 each alternating)  - DL squats on BOSU x 10 slowly, cues for weight shift backwards       Gait training 15 Ambulation with standard walker x 80 feet. No seated rest breaks requested.   Ambulated 10 feet x 3 reps with finger tip support in //.   Ambulated 10 feet x 6 reps with \"heel\" walking to improved ankle DF and overall foot clearance. Added this to HEP.   Modality__________________                Total 30    Blank areas are intentional and mean the treatment did not include these items.       Talya Mcneal  10/23/2017  "

## 2021-06-13 NOTE — PROGRESS NOTES
[facility] Daily Progress     Patient Name: Shelbi Johnson  Date: 10/25/2017  Visit #: 6/12-16 visits - [facility]  [facility] visit #:    Referral Diagnosis: Somatoform disorder   Referring provider: Apoorva Nair MD Dr. Mark Gormley  Visit Diagnosis:     ICD-10-CM    1. Decreased functional mobility and endurance Z74.09    2. Weakness of both lower extremities R29.898    3. Poor tolerance for activity Z78.9          Assessment:   Patient continues to make progress with regards to both gait and strength. She was able to ambulate without AD with normal gait pattern x 600 feet for the first time in PT- she was also able to ascend/descend stairs with reciprocal pattern and 1 HR. Instructed pt these are her newest additions to HEP. Patient requires reminders and encouragement that conversion/somatoform disorder is not a physically permanent condition; she does seem to respond well to this encouragement.  Patient is benefitting from skilled physical therapy and is making steady progress toward functional goals.  Patient is appropriate to continue with skilled physical therapy intervention, as indicated by initial plan of care.    Goal Status:  Pt. will be independent with home exercise program in : 2 weeks  Pt will: return to driving independently 7/7 days/week for 1 week in 12 weeks  Pt will: return to walking without AD without visible gait pattern to return to normal school function in 12 weeks  Pt will: ascend/descend 1 flight of stairs without HR with reciprocal pattern 7/7 days/week for 1 week to ascend stairs in her home in 12 weeks  Pt will: perform all transfers independently without assistance or cues to return to normal daily activities in 12 weeks    Plan / Patient Education:     Continue with initial plan of care.  Progress with home program as tolerated.    Subjective:   Patient reports she used her walker at school yesterday and regained full use of her legs at home. No other changes. She feels  "the mornings are \"the worst\" as she wakes without mobility and use of her legs.      Objective:     Pt presents today with 4WW but states she is confident she can walk without.  Ambulated without walker, normal gait pattern with good stride and no visible deviation.    Treatment Today     TREATMENT MINUTES COMMENTS   Evaluation     Self-care/ Home management     Manual therapy     Neuromuscular Re-education 8 Sit<>stand from standard chair x 12 reps without UE support. Cues for glut activation upon standing for posture.    BOSU squats x 10 on dome x 10 on flat surface, slowly with weight shift posterior.     NBOS airex EC x 30 seconds x 3 reps. No LOB.     Therapeutic Activity     Therapeutic Exercises 7 Nu-step WL 5, 5:00 total.    Stair climbing with single UE support and 1 hand on HR x 6 reps total x 5 stairs, reciprocal pattern.        Gait training 15 Ambulation without walker, distant supervision from therapist with conversation x 600 feet total.     Pastor step over in // with cues for knee drive and ankle DF forward/backwards/sideways x 10 feet x 12 reps total, single UE support on //.    Modality__________________                Total 30    Blank areas are intentional and mean the treatment did not include these items.       Talya Mcneal  10/25/2017  "

## 2021-06-13 NOTE — PROGRESS NOTES
[facility] Child Check    ASSESSMENT & PLAN  Shelbi Johnson is a 17  y.o. 2  m.o. who has normal growth and normal development.  Diagnoses and all orders for this visit:    Encounter for routine child health examination without abnormal findings  -     Influenza, Seasonal Quad, Preservative Free 36+ Months (syringe)  -     Lipid Profile  -     Hearing Screening  -     Cancel: Lipid Cascade  -     Chlamydia trachomatis & Neisseria gonorrhoeae, Amplified Detection  -     Syphilis Screen, Cascade  -     Hepatitis C Antibody (Anti-HCV)  -     HIV Antigen/Antibody Screening [place]    Anxiety    Other orders  -     albuterol (PROAIR HFA;PROVENTIL HFA;VENTOLIN HFA) 90 mcg/actuation inhaler; Inhale 2 puffs every 6 (six) hours as needed for wheezing.  Dispense: 1 Inhaler; Refill: 0  -     levonorgestrel-ethinyl estradiol (ORSYTHIA) 0.1-20 mg-mcg per tablet; Take 1 tablet by mouth daily.  Dispense: 84 tablet; Refill: 3  -     FLUoxetine (PROZAC) 20 MG capsule; Take 1 capsule (20 mg total) by mouth daily.  Dispense: 90 capsule; Refill: 0  -     Meningococcal B (PF); Future; Expected date: 6/1/18  -     Meningococcal B (PF); Future; Expected date: 8/1/18      Follow up in 3 months for anxiety recheck (start of fluoxetine).    IMMUNIZATIONS/LABS  Immunizations were reviewed and orders were placed as appropriate., I have discussed the risks and benefits of all of the vaccine components with the patient/parents.  All questions have been answered. and lipid panel ordered today.    REFERRALS  Dental:  Recommend routine dental care as appropriate., The patient has already established care with a dentist.  Other:  No additional referrals were made at this time.    ANTICIPATORY GUIDANCE  I have reviewed age appropriate anticipatory guidance.  Social:  Friends and Extracurricular Activities  Parenting:  Homework and Confidential Health Care  Nutrition:  Body Image and Nutritious Eating  Play and Communication:  Hobbies, Read  Books and Media Violence Awareness  Health:  Acne, Drugs, Smoking, Alcohol, Activity (>45 min/day), Sleep, Sun Screen and Dental Care  Safety:  Seat Belts, Swimming Safety, Bike/Motorcycle Helmets, Safe storage of Weapons and Outdoor Safety Avoiding Sun Exposure  Sexuality:  Body Changes, Safe Sex, STD's and Contraception    HEALTH HISTORY  Do you have any concerns that you'd like to discuss today?: refills birth control follow up   Contraception: She has been taking Orsythia for a total of two months at this time. She was started on the contraceptive for severe menstrual cramping. She has had a total of one period since starting it. She is currently on the third pill of the placebo week, but her period has not arrived yet. Her cramps were not as severe since starting the new oral contraceptive. Of note, her insurance will no longer cover a 30 day supply, she will need a 90 day supply.     Asthma: Her asthma has been relatively okay. Her inhaler was last refilled in 2014 and she believes she is in need of a new prescription at this time. She needed to use her inhaler a couple of weeks ago during a panic attack to assist with her breathing. She will stop activities during gym until she is able to catch her breath. She will use her inhaler if she is unable to get her breath back. The inhaler is only needed less than 50% of the time when she is being active.     Anxiety: She restarted fluoxetine for her anxiety on the first day of school, 20 mg once daily. She has been experiencing panic attacks.  A panic attack a couple of weeks ago lasted around 45 minutes, and she experienced some brief, self-limited paralysis. The panic attacks began the week before school started. She was nervous about starting school again and was completing her college applications at that time. She has seen a counselor when she was in 9th grade. Her counselor was located in [place], [place].     Review of Systems;  She started taking 1000 IU of  vitamin D3 once daily as of yesterday. She missed all of last week of school due to illness. She works on legs and abs at home. She is unable to work on cardio because of pain in her knees. She occasionally takes Tums for an upset stomach. Menarche occurred at the age of 12-13. Her acne worsens with her period. She has had sex 1-2 times a couple of months ago. She used condoms. She has questions about her CKD and if it will affect her future pregnancies and children. She does not experience auras with her headaches. She has been told she has an extra vertebrae in her back.       Roomed by: LUCILLE Hsu CMA    Accompanied by Mother    Refills needed? No    Do you have any forms that need to be filled out? No        Do you have any significant health concerns in your family history?: No  Family History   Problem Relation Age of Onset     Depression Mother      Hyperlipidemia Mother      Osteoarthritis Mother      Other Mother      Spinal stenosis     No Medical Problems Father      No Medical Problems Brother      Other Maternal Grandmother      Spinal stenosis     Paget's disease of bone Maternal Grandmother      Cardiomyopathy Maternal Grandmother      viral     Colon cancer Maternal Grandmother      Unsure if mass was cancerous or not      Alzheimer's disease Maternal Grandfather      Hypertension Maternal Grandfather      Hyperlipidemia Maternal Grandfather      Transient ischemic attack Maternal Grandfather      Breast cancer Paternal Grandmother      Stroke Paternal Grandfather    Family: Her mother has familial hypercholesterolemia.    Since your last visit, have there been any major changes in your family, such as a move, job change, separation, divorce, or death in the family?: No    Home  Who lives in your home?:  Mom, dad, brother  Social History     Social History Narrative    She lives with her mother, father, and her older brother who attends the [employer]. The family has a dog and cat at home.    Social: She  has a beagle mix dog at home.     Do you have any trouble with sleep?:  No  She typically goes to sleep around 9 PM and will sleep the entire night.     Education  What school does your child attend?:  [employer]   What grade is your child in?:  12th  How does the patient perform in school (grades, behavior, attention, homework?: doing well   She has already applied and been accepted to [employer]. She is excited to start. She has AP and CIS classes this year.     Eating  Does patient eat regular meals including fruits and vegetables?:  yes  What is the patient drinking (cow's milk, water, soda, juice, sports drinks, energy drinks, etc)?: cow's milk- 1% and water  Does patient have concerns about body or appearance?:  No  Her weight is down slightly because she was sick last week and could not eat much more than soup. She likes eating salads at home. She also like apples and peas. She does not like the smell of oranges. She does not like drinking milk, her mother needs to tell her to drink it with dinner otherwise she prefers water. She does not take calcium.     Activities  Does the patient have friends?:  yes  Does the patient get at least one hour of physical activity per day?:  yes  Does the patient have less than 2 hours of screen time per day (aside from homework)?:  No  What does your child do for exercise?:  Walks, workout routine  Does the patient have interest/participate in community activities/volunteers/school sports?:  no    MENTAL HEALTH SCREENING  PHQ-2 Total Score: 0 (9/26/2017  5:00 PM)  No Data Recorded    VISION/HEARING  Vision: Patient is already followed by a vision specialist  Hearing:  Completed. See Results   She sees the ophthalmologist annually. Her glasses were updated in June 2017.      Hearing Screening    125Hz 250Hz 500Hz 1000Hz 2000Hz 3000Hz 4000Hz 6000Hz 8000Hz   Right ear:   20 20 20  20     Left ear:   20 20 20  20         TB Risk Assessment:  The patient and/or parent/guardian  "answer positive to:  patient and/or parent/guardian answer 'no' to all screening TB questions    Dental  Is your child being seen by a dentist?  Yes  Flouride Varnish Application Screening  Is child seen by dentist?     Yes    Patient Active Problem List   Diagnosis     Renal Medullary Necrosis     Intermittent Asthma     Chronic Kidney Disease, Stage 2     Type IV Renal Tubular Acidosis     Hypermobility Syndrome     Fasciculations     Transient Tic Disorder     Acne     Nasal Passage Blockage (Stuffiness)     Allergic Reaction     Conversion disorder     Dysmenorrhea     Migraine headache without aura       Drugs  Does the patient use tobacco/alcohol/drugs?:  no    Safety  Does the patient have any safety concerns (peer or home)?:  no  Does the patient use safety belts, helmets and other safety equipment?:  yes    Sex  Is the patient sexually active?:  yes, uses condoms, on oral contraceptive.   1 male partner      MEASUREMENTS  Height:  5' 4.5\" (1.638 m)  Weight: 112 lb 3.4 oz (50.9 kg)  BMI: Body mass index is 18.96 kg/(m^2).  Blood Pressure: 124/62  Blood pressure percentiles are 87 % systolic and 34 % diastolic based on NHBPEP's 4th Report. Blood pressure percentile targets: 90: 125/80, 95: 129/84, 99 + 5 mmH/97.    PHYSICAL EXAM  Constitutional: She appears well-developed and well-nourished.   HEENT: Head: Normocephalic.    Right Ear: Tympanic membrane, external ear and canal normal.    Left Ear: Tympanic membrane, external ear and canal normal.    Nose: Nose normal.    Mouth/Throat: Mucous membranes are moist. Oropharynx is clear.    Eyes: Conjunctivae and lids are normal. Pupils are equal, round, and reactive to light.   Neck: Neck supple. No tenderness is present.   Cardiovascular: Normal rate and regular rhythm. No murmur heard.  Pulmonary/Chest: Effort normal and breath sounds normal. There is normal air entry. Breast development is normal.  Camron stage 5.   Abdominal: Soft. There is no " hepatosplenomegaly. No inguinal hernia.   Musculoskeletal: Normal range of motion. Normal strength and tone.. Mild curvature of spine.   : Normal external female genitalia.  Camron stage 5.   Neurological: She is alert. She has normal reflexes. Gait normal.   Psychiatric: She has a normal mood and affect. Her speech is normal and behavior is normal. Quite pleasantly discussing multiple problems.  Skin: Clear. No rashes.     ADDITIONAL HISTORY SUMMARIZED (2): Reviewed neurology note from 8/31/17; CKD stage II stable.   DECISION TO OBTAIN EXTRA INFORMATION (1): Up to date re CKD and pregnancy.   RADIOLOGY TESTS (1): None.  LABS (1): Ordered labs today.   MEDICINE TESTS (1): None.  INDEPENDENT REVIEW (2 each): None.     The visit lasted a total of 48 minutes face to face with the patient. Over 50% of the time was spent counseling and educating the patient about general wellness and contraception.    ILinsey, am scribing for and in the presence of, Dr. Apoorva Nair.    I, Dr. Apoorva Nair, personally performed the services described in this documentation, as scribed by Linsey Diaz in my presence, and it is both accurate and complete.    Total data points: 3

## 2021-06-13 NOTE — PROGRESS NOTES
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 Mark Gormley  Visit Diagnosis:     ICD-10-CM    1. Decreased functional mobility and endurance Z74.09    2. Weakness of both lower extremities R29.898    3. Poor tolerance for activity Z78.9          Assessment:   Patient has returned to school using a walker rather than wheelchair consistently since last visit - which indicates progress in LE strength. She tolerated walking on the treadmill at increased speed with incline for nearly 1/2 mile without visible gait deviation. She does c/o LE weakness and poor endurance related to deconditioning.   Discussed pt is appropriate to start weaning from her walker at school this week to promote normal high school age mobility.     Patient is benefitting from skilled physical therapy and is making steady progress toward functional goals.  Patient is appropriate to continue with skilled physical therapy intervention, as indicated by initial plan of care.    Goal Status:  Pt. will be independent with home exercise program in : 2 weeks  Pt will: return to driving independently 7/7 days/week for 1 week in 12 weeks  Pt will: return to walking without AD without visible gait pattern to return to normal school function in 12 weeks  Pt will: ascend/descend 1 flight of stairs without HR with reciprocal pattern 7/7 days/week for 1 week to ascend stairs in her home in 12 weeks  Pt will: perform all transfers independently without assistance or cues to return to normal daily activities in 12 weeks    Plan / Patient Education:     Continue with initial plan of care.  Progress with home program as tolerated.    Subjective:   Patient reports she has better mobility since last visit- she has woken up with better leg function.   She has been using her walker at  school for 2-3 days. No wheelchair. Pt notices her leg function is worse the morning after she regains full mobility and she is unsure why.  She feels her sensation has returned consistently for the most part.     Objective:     Pt presents today with normal gait pattern, no AD.    Treatment Today     TREATMENT MINUTES COMMENTS   Evaluation     Self-care/ Home management     Manual therapy     Neuromuscular Re-education     Therapeutic Activity     Therapeutic Exercises 39   Upright bike WL2, 6:00 total warm up, subjective measures taken.     Ex's performed today:    Exercise #3: Ab set - advanced with unilateral>bilateral>unilateral lower   Comment #3: HEP x 10 seconds x 15-20 rep  Exercise #4: Bridges- advanced today with alternating knee ext, no hold  Comment #4: HEP x 5 seconds x 10 reps- 2 sets performed in clinic   Exercise #6: Glut raises  Comment #6: In clinic only x 10 reps x 2 sets  Exercise #7: Hamstring curls  Comment #7: In clinic only x 10 reps x 2 sets  Exercise #8: Fire hydrant  Comment #8: In clinic only L2 band x 10 reps x 2 sets  Exercise #9: Ball roll - ab set tall kneeling  Comment #9: In clinic only x 10 reps x 2 sets- cues to keep feet on table and for ab activation to avoid Lx hyperextension         Treadmill walking without UE support:  2.0 mph x 1:00 comfortable speed, cues for arm swing  2.4 mph x 2:00   2.6 mph x 2:00   2.8 mph x 2:00   3.0 mph x 1:00  3.2 mph x 2:00     Incline 2%  Pt able to maintain conversation with normal gait pattern and reciprocal arm swing. Pt c/o LE fatigue after 10:00 total on treadmill.      Gait training       Modality__________________                Total 39    Blank areas are intentional and mean the treatment did not include these items.       Talya Mcneal  11/6/2017

## 2021-06-13 NOTE — PROGRESS NOTES
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 Mark Gormley  Visit Diagnosis:     ICD-10-CM    1. Decreased functional mobility and endurance Z74.09    2. Weakness of both lower extremities R29.898    3. Poor tolerance for activity Z78.9          Assessment:   Patient's affect again today appears to be improved with less c/o feeling tired and high motivation. She was able to ambulate normally throughout entire session. HEP was advanced with additional core strengthening. Pt feels she is not confident enough to walk at school without AD; she worries what students may think if she transitions from a wheelchair to walking normally without AD.    Patient is benefitting from skilled physical therapy and is making steady progress toward functional goals.  Patient is appropriate to continue with skilled physical therapy intervention, as indicated by initial plan of care.    Goal Status:  Pt. will be independent with home exercise program in : 2 weeks  Pt will: return to driving independently 7/7 days/week for 1 week in 12 weeks  Pt will: return to walking without AD without visible gait pattern to return to normal school function in 12 weeks  Pt will: ascend/descend 1 flight of stairs without HR with reciprocal pattern 7/7 days/week for 1 week to ascend stairs in her home in 12 weeks  Pt will: perform all transfers independently without assistance or cues to return to normal daily activities in 12 weeks    Plan / Patient Education:     Continue with initial plan of care.  Progress with home program as tolerated.    Subjective:   Patient reports she left her appt last time and was able to walk with normal mobility and movement; however she woke up Thursday morning and was unable to move her legs.   She wakes up this morning with better movement  in her legs, presents today with 4WW. Pt feels she may be able to go to school today without her walker but worries she may tire out.    Objective:     Pt presents today with 4WW. Leaves appt with normal ambulation.    Treatment Today     TREATMENT MINUTES COMMENTS   Evaluation     Self-care/ Home management     Manual therapy     Neuromuscular Re-education     Therapeutic Activity     Therapeutic Exercises 25   Amb drills on 50 foot line:   - high knee steps  - forward lunges  - backwards lunges  - heel walking  - toe walking  No visible gait deviation. Cues for avoiding knee valgus     Sit<> stand with B UE support x 10 reps, good demo today.     Upright bike WL2, 6:00 total warm up, subjective measures taken.     Advanced HEP core strengthening:    Exercise #3: Ab set - advanced with unilateral>bilateral>unilateral lower   Comment #3: HEP x 10 seconds x 15-20 rep  Exercise #4: Bridges- advanced today with alternating knee ext, no hold  Comment #4: HEP x 5 seconds x 10 reps  Exercise #5: Clamshell L2 band   Comment #5: HEP to fatigue B       Gait training 5 Treadmill walking without UE support:  2.4 mph x 2:00 warm up comfortable speed  2.6 mph x 2:00 with head turns right/left up/down     Modality__________________                Total 30    Blank areas are intentional and mean the treatment did not include these items.       Talya Mcneal  11/3/2017

## 2021-06-13 NOTE — PROGRESS NOTES
"ASSESSMENT:  1. Conversion disorder             PLAN:  Continue PT  Follow up with [facility] provider re need for inpt rehab  Continue fluoxetine 30 mg daily    I think that counseling is a very good idea  Patient Instructions   For same-day evaluation and referral, [facility] emergency room can do full evaluation and make recommendations for treatment  (including starting meds)    [address] [place] [address]  _____________________________________________    [facility]    Walk-in crisis services at [address] [place] [id number]  Monday - Friday from 8:00 a.m. to 7:00 p.m., and Saturday from 11:00 a.m. to 3:00 p.m.  24/7 Crisis Hotline [phone]      CRISIS CONNECTION [phone]    [facility] Mobile Crisis Unit at [phone]    _________________________________________________    CRISIS TEXT LINE    Text \"START\" to 615868    Some people might feel more comfortable using  this than a crisis phone service.  It is free, confidential and available 24/7    _________________________________________________    [facility]    _________________________________________________    [facility] Access   [employer]  Log in to search - username/password = search              No Follow-up on file.    CHIEF COMPLAINT:  Chief Complaint   Patient presents with     Follow-up     Depression       HISTORY OF PRESENT ILLNESS:  Shelbi is a 17 y.o. female presenting to the clinic today depression follow up. Accompanied by her mom.     Paralysis: This September, she was having muscle spasm and restarted fluoxetine. She lost movement in her legs while she was at a friends house. Her muscles were tight and she could not bend her legs. They are trying to get insurance to cover PT. She has gotten full mobility back twice but the progress she makes is gone when she wakes up. She had really bad migraines 2 weeks before the paralysis hit. The school nurse read on " "the [address] website that it was brought on by migraines. She has lost a lot of weight and has a low appetite. She now eats about 1/4 of what she normally eats. She denies diarrhea or constipation. She is caught up with school work, she gets A's and B's.     Depression: She has been really depressed, cries a lot, and often does not want to get out of bed. A kid at school called her an \"attention whore\" about her disability and she wanted to hurt herself. In 7th grade she mentioned wanting to die and her family got her help. She is tired everyday from using her wheelchair. She avoids social media because she does not want to be cyber bullied.       REVIEW OF SYSTEMS:   All other systems are negative.    PFSH:  She will go to [facility] [employer].   Past Medical History:   Diagnosis Date     Depression      Exercise-induced asthma      Past Surgical History:   Procedure Laterality Date     KNEE ARTHROSCOPY W/ PLICA EXCISION Right 11/15/2013    Right lateral release and removal of plica, [facility], [place], [place]     TONSILLECTOMY Bilateral 07/19/2017         VITALS:  Vitals:    10/26/17 1411   BP: 112/62   Pulse: 74   SpO2: 97%     Wt Readings from Last 3 Encounters:   09/26/17 112 lb 3.4 oz (50.9 kg) (29 %, Z= -0.57)*   06/27/17 115 lb (52.2 kg) (36 %, Z= -0.36)*   05/24/17 115 lb (52.2 kg) (37 %, Z= -0.34)*     * Growth percentiles are based on [facility] 2-20 Years data.     There is no height or weight on file to calculate BMI.    PHYSICAL EXAM:  General Appearance: Alert and no distress, appears stated age.  Head: Normocephalic, without obvious abnormality, atraumatic  Eyes: PERRL, conjunctiva/corneas clear  Ears: Normal TM's and external ear canals, both ears  Nose: Nares normal, mucosa normal  Throat: Moist mucosa, post pharynx clear  Neck: Supple, no adenopathy  Lungs: Clear to auscultation bilaterally, no crackles or wheeze, no increased work of breathing  Heart: Regular rate and rhythm, S1 and S2 normal, no " murmur, rub   or gallop  Abdomen: Soft, non tender, non distended   Skin: Skin color, texture, turgor normal, no rashes or lesions  Neurologic:  Grossly normal    QUALITY MEASURES:  The following are part of a depression follow up plan for the patient:  mental health care assessment    ADDITIONAL HISTORY SUMMARIZED (2): None.  DECISION TO OBTAIN EXTRA INFORMATION (1): None.   RADIOLOGY TESTS (1): None.  LABS (1): None.  MEDICINE TESTS (1): None.  INDEPENDENT REVIEW (2 each): None.     The visit lasted a total of 30 minutes face to face with the patient. Over 50% of the time was spent counseling and educating the patient about depression management.    Ricardo GARAY, am scribing for and in the presence of, Dr. Nair.    Apoorva GARAY, personally performed the services described in this documentation, as scribed by Ricardo Mandel in my presence, and it is both accurate and complete.    MEDICATIONS:  Current Outpatient Prescriptions   Medication Sig Dispense Refill     albuterol (PROAIR HFA;PROVENTIL HFA;VENTOLIN HFA) 90 mcg/actuation inhaler Inhale 2 puffs every 6 (six) hours as needed for wheezing. 1 Inhaler 0     ascorbic acid (VITAMIN C) 250 MG tablet Take 250 mg by mouth daily.       cholecalciferol, vitamin D3, 1,000 unit tablet Take 1,000 Units by mouth daily.       inhalational spacing device Spcr daily as needed. With inhaler       inulin (FIBER GUMMIES) 2.5 gram Chew Chew 2 tablets daily.  0     levonorgestrel-ethinyl estradiol (ORSYTHIA) 0.1-20 mg-mcg per tablet Take 1 tablet by mouth daily. 84 tablet 3     pediatric multivitamin (FLINTSTONES) Chew chewable tablet Chew 1 tablet daily.       FLUoxetine (PROZAC) 10 MG capsule Take 3 capsules (30 mg total) by mouth daily. 270 capsule 0     No current facility-administered medications for this visit.        Total data points: 0

## 2021-06-14 NOTE — PROGRESS NOTES
[facility] Daily Progress     Patient Name: Shelbi Johnosn  Date: 12/20/2017  Visit #: 25/26 - [facility]  PTA visit #:    Referral Diagnosis: Somatoform disorder   Referring provider: Apoorva Nair MD Dr. Mark Gormley  Visit Diagnosis:     ICD-10-CM    1. Decreased functional mobility and endurance Z74.09    2. Weakness of both lower extremities R29.898    3. Poor tolerance for activity Z78.9          Assessment:   Patient displays slightly improved sit>stand today in // as she was able to perform more reps with less cues from therapist. Discussed with patient and her mother how patient should continue independence in ADL's as much as possible.       Goal Status:  Pt. will be independent with home exercise program in : 2 weeks;Met  Pt will: return to driving independently 7/7 days/week for 1 week in 12 weeks (has not tried)  Pt will: return to walking without AD without visible gait pattern to return to normal school function in 12 weeks  Pt will: ascend/descend 1 flight of stairs without HR with reciprocal pattern 7/7 days/week for 1 week to ascend stairs in her home in 12 weeks (goal met)  Pt will: perform all transfers independently without assistance or cues to return to normal daily activities in 12 weeks (goal met)    Plan / Patient Education:     Continue with initial plan of care.  Progress with home program as tolerated.   Pt will be going on vacation for 2 weeks. Fu after.     Subjective:   Patient reports no changes since last visit. Feels frustrated her father is having her do more ADL's independently, as it takes her a long time to perform activities at home.   [facility] was contacted by the author of this note regarding patients progress.     Objective:     Pt presents in wheelchair with her father to appt today. Unable to move her legs actively although transfers independently.     Treatment Today     TREATMENT MINUTES COMMENTS   Evaluation     Self-care/ Home management     Manual  therapy     Neuromuscular Re-education 24 - Sit>stand from chair using B UE support on // x 8 reps x 1 set, CGA from therapist. Performed second set of 8 reps following ambulation. Pt displays improved eccentric control upon sitting.    - Ambulation in // without assistance from therapist and B UE support on bars x 10 feet x 6 reps total. Therapist assist with advancing B LE today and mod A for blocking LE as she advancers contralateral limb.      Therapeutic Activity     Therapeutic Exercises 6   Nu-step WL 5 x 6:00 total, pt using single UE for pushing; active movement of her legs present.       Established the following functional goals in collaboration with patient today:  1) walk/stand with walker at home    Gait training     Modality__________________                Total 30    Blank areas are intentional and mean the treatment did not include these items.     Talya Mcneal  12/20/2017

## 2021-06-14 NOTE — PROGRESS NOTES
[facility] Daily Progress     Patient Name: Shelbi Johnson  Date: 11/15/2017  Visit #: 13/12-16 visits - [facility]  [facility] visit #:    Referral Diagnosis: Somatoform disorder   Referring provider: Apoorva Nair MD Dr. Mark Gormley  Visit Diagnosis:     ICD-10-CM    1. Decreased functional mobility and endurance Z74.09    2. Weakness of both lower extremities R29.898    3. Poor tolerance for activity Z78.9          Assessment:   Patient's progress has fluctuated since last visit; she continues to have varying levels of lower extremity function as she wakes in the morning. With physical therapy she is typically able to regain normal walking function, but this does not appear to carry over for >24 hours. Provided patient with positive encouragement and reinforcement to use normal walking and perform ADL's as normally as possible.     Patient is benefitting from skilled physical therapy and is making steady progress toward functional goals.  Patient is appropriate to continue with skilled physical therapy intervention, as indicated by initial plan of care.    Goal Status:  Pt. will be independent with home exercise program in : 2 weeks  Pt will: return to driving independently 7/7 days/week for 1 week in 12 weeks  Pt will: return to walking without AD without visible gait pattern to return to normal school function in 12 weeks  Pt will: ascend/descend 1 flight of stairs without HR with reciprocal pattern 7/7 days/week for 1 week to ascend stairs in her home in 12 weeks  Pt will: perform all transfers independently without assistance or cues to return to normal daily activities in 12 weeks    Plan / Patient Education:     Continue with initial plan of care.  Progress with home program as tolerated.    Subjective:   Patient reports her mobility and LE function has fluctuated since last visit.     Objective:     Pt presents today with 4WW, occasional knee buckling. Leaves appt with normal gait pattern without  use of walker.     Treatment Today     TREATMENT MINUTES COMMENTS   Evaluation     Self-care/ Home management     Manual therapy     Neuromuscular Re-education     Therapeutic Activity     Therapeutic Exercises 28   Nu-step WL 6, 6:00 total, pt using LE only.     Ex's performed today:    - SLR with 2lb weight added today x 10 reps  - Glut raises with swiss ball x 10 reps  - Hamstring curls with swiss ball x 10 reps x 2 sets, cues for abd activation  - Wall slides x 10 second hold x 10 reps, cues for keeping knees in line with toes   - BOSU squats x 10 reps with B UE support      Treadmill walking:   Retro walking 0.8 mph x 2:00; good foot clearance  Regan walking 0.8 mph R x 2:00 normal gait  Regan walking 0.8 mph L x 2:00         Gait training     Modality__________________                Total 28    Blank areas are intentional and mean the treatment did not include these items.       Talya Mcneal  11/15/2017

## 2021-06-14 NOTE — PROGRESS NOTES
[facility] Daily Progress     Patient Name: Shelbi Johnson  Date: 12/6/2017  Visit #: 21/26 - [facility]  PTA visit #:    Referral Diagnosis: Somatoform disorder   Referring provider: Apoorva Nair MD Dr. Mark Gormley  Visit Diagnosis:     ICD-10-CM    1. Decreased functional mobility and endurance Z74.09    2. Weakness of both lower extremities R29.898    3. Poor tolerance for activity Z78.9          Assessment:   Patient presents today with paralysis of LE; unable to use LE for transfers. At the end of appt, she was able to ambulate 20 feet with standard walker. Patient and mother are pursuing visits with neurologist through [facility] and potentially evaluation at [facility].   Given patient's ability to perform HEP at home and variable progress in physical therapy, plan to decrease frequency of physical therapy visits to 2x/week and eventually 1x/week.     Patient is benefitting from skilled physical therapy and is making steady progress toward functional goals.  Patient is appropriate to continue with skilled physical therapy intervention, as indicated by initial plan of care.    Goal Status:  Pt. will be independent with home exercise program in : 2 weeks;Met  Pt will: return to driving independently 7/7 days/week for 1 week in 12 weeks (has not tried)  Pt will: return to walking without AD without visible gait pattern to return to normal school function in 12 weeks  Pt will: ascend/descend 1 flight of stairs without HR with reciprocal pattern 7/7 days/week for 1 week to ascend stairs in her home in 12 weeks (goal met)  Pt will: perform all transfers independently without assistance or cues to return to normal daily activities in 12 weeks (goal met)    Plan / Patient Education:     Continue with initial plan of care.  Progress with home program as tolerated.   Continue to review morning exercise routine and progress/modify as needed. (possible add balance exercises to morning routine when able to do  so)     Subjective:   Patient returns to PT in wheelchair, expresses frustration with her school not being disability accessible. She rides disability bus to school and arrives late every day; therefore is frustrated she can't get to class on time. Pt states her mother will be contacting [employer] regarding the school's lack of disability accommodations.     Objective:     Pt presents in wheelchair with her mother to appt today.  Presents with inability to actively lift legs, states her legs are completely numb.      Treatment Today     TREATMENT MINUTES COMMENTS   Evaluation     Self-care/ Home management     Manual therapy     Neuromuscular Re-education     Therapeutic Activity     Therapeutic Exercises 39   Nu-step WL 1 x 8:00 total. Last 1:00 B LE only.     - Quad set x 5 seconds x 10 reps  - Glut set x 5 seconds x 10 reps  - Bridges in shortened range x 10 reps  - Bridges with lat pull down x 10 reps   - Hooklying russian twist x 20 reps- pt able to perform with hips at 90-90 deg  - Glut raises x 10 reps on orange swiss ball  - Mini curl-up x 10 reps  - Oblique curl up x 10 reps, alternating       Ambulation with walker, approx 50% UE weight x 20 feet total.     Established the following functional goals in collaboration with patient today:  1) walk at home with hands on counter top x 3 laps     Gait training     Modality__________________                Total 39    Blank areas are intentional and mean the treatment did not include these items.     Talya Mcneal  12/6/2017

## 2021-06-14 NOTE — PROGRESS NOTES
[facility] Daily Progress     Patient Name: Shelbi Johnson  Date: 11/24/2017  Visit #: 17/26 - [facility]  PTA visit #:    Referral Diagnosis: Somatoform disorder   Referring provider: Apoorva Nair MD Dr. Mark Gormley  Visit Diagnosis:     ICD-10-CM    1. Decreased functional mobility and endurance Z74.09    2. Weakness of both lower extremities R29.898    3. Poor tolerance for activity Z78.9          Assessment:   Patient is making gains by being compliant with the morning routine each morning and she was able to walk in to the clinic without and AD and mild gait deviations. Performing higher level dynamic balance exercises improves the patient overall gait pattern by the end of the session, she will continue to benefit from therapy to advance exercises at home as a routine to improved overall functional mobility.     Patient is benefitting from skilled physical therapy and is making steady progress toward functional goals.  Patient is appropriate to continue with skilled physical therapy intervention, as indicated by initial plan of care.    Goal Status:  Pt. will be independent with home exercise program in : 2 weeks;Met  Pt will: return to driving independently 7/7 days/week for 1 week in 12 weeks (has not tried)  Pt will: return to walking without AD without visible gait pattern to return to normal school function in 12 weeks  Pt will: ascend/descend 1 flight of stairs without HR with reciprocal pattern 7/7 days/week for 1 week to ascend stairs in her home in 12 weeks (goal met)  Pt will: perform all transfers independently without assistance or cues to return to normal daily activities in 12 weeks (goal met)    Plan / Patient Education:     Continue with initial plan of care.  Progress with home program as tolerated.  Continue to Review morning exercise routine and progress/modify as needed. (possible add balance exercises to morning routine when able to do so)     Subjective:   Patient reports  she did well Wednesday after the last session. She states she had a migraine last night that caused her to lose some feeling and get shakey but she feels better today. She comes into the clinic today without walker, cane or wheelchair.   She was able to do the exercises from her morning routine and it went very well today, and she did it yesterday morning as well.      Objective:     Ambulates without an AD today, knee buckling on the right side. Normal gait pattern without limping or knee buckling         Home exercise morning routine 11/20/17 - 11/27/17  Laying on your back:  - Quad set x 5 seconds x 10 reps   - Glut set x 5 seconds x 10 reps   - Heel slides x 5 reps on both sides (as much knee bend as possible)  - Bridges x 5 reps   Sitting (in your chair, edge of bed, kitchen chair)  - Seated marches x 10 reps on both sides   - Leg kicks (knee extension) x 10 reps on both sides  - Mini sit to stand with walker (lifting buttocks)   *transition to home nu-step or wall slides.       Treatment Today     TREATMENT MINUTES COMMENTS   Evaluation     Self-care/ Home management     Manual therapy     Neuromuscular Re-education 20 - BOSU mini squats x 15 reps, cues for weight centered and not shifting to the left side   - BOSU Rhomberg stance EO 2 x 30 sec, EC 2 x 30 sec  - BOSU ball side down EO 30 sec x 2, EC 2 x 30 sec  - BOSU ball side down weight shifting side to side x 10 each, F/B x 10 each side and CW/CCW x 10 each way  - tandem stance on 1/2 foam roll curved side down EO 2 x 30 sec EC 2 x 30 sec    Therapeutic Activity     Therapeutic Exercises 10   Nu-step WL 6, x 6:00 total, UE and LE bilaterally today     Exercises performed in clinic toady:    - bridges with hamstring curls, foot on red ball x 15 reps  - bridges with leg lift each side x 10 reps with feet on red ball  - SL hip abduction 2# weight x 15 reps on L, x 10 reps on R    Established the following functional goals in collaboration with patient  today:  1) perform morning exercise routine tomorrow and the following day before her next session here    2) go shopping with her mom without AD this weekend        Gait training     Modality__________________                Total 30     Blank areas are intentional and mean the treatment did not include these items.     Denisha Samuel  11/24/2017

## 2021-06-14 NOTE — PROGRESS NOTES
[facility] Daily Progress     Patient Name: Shelbi Johnson  Date: 11/22/2017  Visit #: 16/26 - [facility] [place]  PTA visit #:    Referral Diagnosis: Somatoform disorder   Referring provider: Apoorva Nair MD Dr. Mark Gormley  Visit Diagnosis:     ICD-10-CM    1. Decreased functional mobility and endurance Z74.09    2. Weakness of both lower extremities R29.898    3. Poor tolerance for activity Z78.9          Assessment:   Pateint was doing well today, she still consistently has little to no LE function first thing in the morning but she found it helpful to start with the morning exercises to where this afternoon was the first time she was able to walk since friday afternoon. Patient will continue to benefit from skilled intervention, establishing  daily functional goals to improve overall functional mobility.      Patient is benefitting from skilled physical therapy and is making steady progress toward functional goals.  Patient is appropriate to continue with skilled physical therapy intervention, as indicated by initial plan of care.    Goal Status:  Pt. will be independent with home exercise program in : 2 weeks;Met  Pt will: return to driving independently 7/7 days/week for 1 week in 12 weeks (has not tried)  Pt will: return to walking without AD without visible gait pattern to return to normal school function in 12 weeks  Pt will: ascend/descend 1 flight of stairs without HR with reciprocal pattern 7/7 days/week for 1 week to ascend stairs in her home in 12 weeks (goal met)  Pt will: perform all transfers independently without assistance or cues to return to normal daily activities in 12 weeks (goal met)    Plan / Patient Education:     Continue with initial plan of care.  Progress with home program as tolerated.  Review morning exercise routine and progress/modify as needed.     Subjective:   Patient came into the clinic today without an AD and its the first time she was able to get up and walk since  Friday. Patient states she went to school in the morning with the wheelchair, when she got home she was able to walk in with the walker, and used the walker in the house. Now no AD. She feels the stress of her big presentation she had today has helped.   She has done the morning routine yesterday and most of it today but was not able to do all of it today  Objective:     Ambulates without an AD today, knee buckling on the right side.        Home exercise morning routine 11/20/17 - 11/27/17  Laying on your back:  - Quad set x 5 seconds x 10 reps   - Glut set x 5 seconds x 10 reps   - Heel slides x 5 reps on both sides (as much knee bend as possible)  - Bridges x 5 reps   Sitting (in your chair, edge of bed, kitchen chair)  - Seated marches x 10 reps on both sides   - Leg kicks (knee extension) x 10 reps on both sides  - Mini sit to stand with walker (lifting buttocks)   *transition to home nu-step or wall slides.       Treatment Today     TREATMENT MINUTES COMMENTS   Evaluation     Self-care/ Home management     Manual therapy     Neuromuscular Re-education     Therapeutic Activity     Therapeutic Exercises 32   Nu-step WL 6, x 8:00 total, B LEs only, slow steps per minute.   Reviewed: her morning routine, she had not questions with this and found it helpful. As it has only been 1.5 mornings the decision was made to not make changes to this at this time.      Exercises performed in clinic toady:  - SL stance and start reach in all planes x 3 reps on each side  - BOSU mini squats x 15 reps, cues for weight centered and not shifting to the left side   - BOSU step ups into high march x 10 reps on each side  - BOSU statis mini lunges x 10 reps each side  - bridges with hamstring curls, foot on red ball x 15 reps  - bridges with leg lift each side x 10 reps with feet on red ball  - SL hip abduction 2# weight x 15 reps on L, x 10 reps on R    Established the following functional goals in collaboration with patient  today:  1) perform morning exercise routine tomorrow and the following day before her next session here       Gait training     Modality__________________                Total 32    Blank areas are intentional and mean the treatment did not include these items.     Denisha Baker  11/22/2017

## 2021-06-14 NOTE — PROGRESS NOTES
[facility] Daily Progress     Patient Name: Shelbi Johnson  Date: 12/1/2017  Visit #: 19/26 - [facility]  PTA visit #:    Referral Diagnosis: Somatoform disorder   Referring provider: Apoorva Nair MD Dr. Mark Gormley  Visit Diagnosis:     ICD-10-CM    1. Decreased functional mobility and endurance Z74.09    2. Weakness of both lower extremities R29.898    3. Poor tolerance for activity Z78.9          Assessment:   Patient still had limited mobility today since Tuesday and comes to clinic again into the wheelchair. Patient was able to walk better in the bars with B UEs assist, no muscle spasms with th exercises today. Patient has been put on a waiting list for [facility] as [facility] do not have any beds available currently.   Patient is benefitting from skilled physical therapy and is making steady progress toward functional goals.  Patient is appropriate to continue with skilled physical therapy intervention, as indicated by initial plan of care.    Goal Status:  Pt. will be independent with home exercise program in : 2 weeks;Met  Pt will: return to driving independently 7/7 days/week for 1 week in 12 weeks (has not tried)  Pt will: return to walking without AD without visible gait pattern to return to normal school function in 12 weeks  Pt will: ascend/descend 1 flight of stairs without HR with reciprocal pattern 7/7 days/week for 1 week to ascend stairs in her home in 12 weeks (goal met)  Pt will: perform all transfers independently without assistance or cues to return to normal daily activities in 12 weeks (goal met)    Plan / Patient Education:     Continue with initial plan of care.  Progress with home program as tolerated.    Continue to Review morning exercise routine and progress/modify as needed. (possible add balance exercises to morning routine when able to do so)     Subjective:   Patient reports she was very painful that night after the last session, she has had no mobility since Tuesday  this week at all, she has had spasms in her legs and arms, she tried to use distraction and movement of the opposite side to help but it did not help at home.   The nurse emailed them and stated she updated the MD with Karen's notes, currently there are no beds available and 5 people on the waiting list in front of them, it would be at least a few weeks before they would be able to take her in.    Yesterday she was not able to do her morning exercises as the muscles were too sore, this morning she was able to tense them, they were a little sore but she did not have time for the bike.     Objective:     Patient comes into the clinic today in her wheelchair unable to ambulate at all today  She is able to transfer from chair to NuStep and back independently and able to transfer from chair to mat without assistance.        Home exercise morning routine 11/20/17 - 11/27/17  Laying on your back:  - Quad set x 5 seconds x 10 reps   - Glut set x 5 seconds x 10 reps   - Heel slides x 5 reps on both sides (as much knee bend as possible)  - Bridges x 5 reps   Sitting (in your chair, edge of bed, kitchen chair)  - Seated marches x 10 reps on both sides   - Leg kicks (knee extension) x 10 reps on both sides  - Mini sit to stand with walker (lifting buttocks)   *transition to home nu-step or wall slides.       Treatment Today     TREATMENT MINUTES COMMENTS   Evaluation     Self-care/ Home management     Manual therapy     Neuromuscular Re-education     Therapeutic Activity     Therapeutic Exercises 40   Nu-step WL 4, x 8:00 total (300 total steps) , UE and LE bilaterally today initially, did the last 3 minutes B LEs only with no resistance very slowly  Tried to cue her to relax the opposite leg as it appears she is kash both side at the same time.       Exercises performed in clinic toady:    - supine ankle pumps with feet on foam roll: had to assist into DF and cues to hold x 8 reps each side   - Quad sets - added towel  under her knee 5 sec hold x 10 reps each side  - Gluteal sets with PPT  - 5 sec hold x 10 reps   - Heel slides with assistance and feet on foam roller  Therapist assisted x 5 reps and then patient did x 3 reps on each side   - bridges x 10 reps    Standing in // bars x 30 sec   walking in bars with pateint using B UEs on bars to hold herself up and having her R > L leg buckle but she does not fall in clinic, walked length of bars x 4,     Mom pushed her chair out of clinic for her to rest her arms as they were sore from walking in bars     Established the following functional goals in collaboration with patient today:  1) standing in walker at home tonight and do the exercises a second time with Bike      Gait training     Modality__________________                Total 40    Blank areas are intentional and mean the treatment did not include these items.     Denisha Baker  12/1/2017

## 2021-06-14 NOTE — PROGRESS NOTES
ASSESSMENT:  1. Conversion disorder  Ambulatory referral to Neurology   2. Migraine headache without aura  Ambulatory referral to Neurology     Abetter abortive treatment such as sumatriptan for migraines would be helpful. If frequent migraines persist , >1x/week, prophylactic therapy would be indicated.    PLAN:  Patient Instructions   I think that an appointment sooner than Jan 12 would be beneficial.     Schedule with [facility]   PHONE: [phone]    I will get in touch with you after I talk to [facility].     Continue fluoxetine 30 mg daily    Continue PT    Call if any questions        No Follow-up on file.    CHIEF COMPLAINT:  Chief Complaint   Patient presents with     Follow-up     Follow-up Fluoxitine. Referral to [facility]       HISTORY OF PRESENT ILLNESS:  Shelbi is a 17 y.o. female presenting to the clinic today for a fluoxetine med check. Accompanied by her mom.   At her last visit on...she increased her Fluoxetine dosage from 20 to 30 mg and it is working well. She thinks it has affected her appetite and she has lost some weight. She is worried bc someone told her it would not work if she was not at a certain weight.  She says she eats well, if not more than usual. PT said her muscle tone in her legs looks better.    Conversion Disorder: There is no space for her at inpatient rehab at [facility]. In the meantime she is [facility] with variable improvement of her symptoms. She is still using a wheel chair full time at school. She has not seen a neurologist since this episode began in Sept when saw a neurologist at [facility]. She has an appointment scheduled for 01/12/18 with Dr. Farhat Iverson, who saw her when she had the first episode of conversion disorder.. Mom has read about connections between migraines and conversion disorder and is interested in an evaluation at [facility].     Migraines: She has ongoing problems with migraines, usually with her period.  She has had minor improvement with hormonal contraception. At the end of September, immediately preceding her paralysis, she had migraines everyday for 2 weeks. She could not eat or tolerate light or sound. She felt hot and cold on and off. She recently got a migraine and her arm shook so bad she could not eat. In the last month she has gotten 3-4 migraines. She takes Tylenol but it does not help much. She cannot take NSAIDs due to chronic kidney disease. She reports that she has never had an aura with her migraines.     She goes to sleep around 8:30-9 pm on weekdays. She sleeps in until 11 am on the weekends but she is still really tired because she uses a lot of energy to move around the house. Headaches have not changed and she gets them everyday. She says she has headaches almost everyday. She has been able to stay caught up in school.     REVIEW OF SYSTEMS:   All other systems are negative.    Past Medical History:   Diagnosis Date     Depression      Exercise-induced asthma      Past Surgical History:   Procedure Laterality Date     KNEE ARTHROSCOPY W/ PLICA EXCISION Right 11/15/2013    Right lateral release and removal of plica, [facility], [place], [place]     TONSILLECTOMY Bilateral 07/19/2017         VITALS:  Vitals:    12/05/17 1601   BP: 106/62   Pulse: 82   SpO2: 98%   Weight: 109 lb 12.8 oz (49.8 kg)     Wt Readings from Last 3 Encounters:   12/05/17 109 lb 12.8 oz (49.8 kg) (22 %, Z= -0.76)*   09/26/17 112 lb 3.4 oz (50.9 kg) (29 %, Z= -0.57)*   06/27/17 115 lb (52.2 kg) (36 %, Z= -0.36)*     * Growth percentiles are based on CDC 2-20 Years data.     There is no height or weight on file to calculate BMI.    PHYSICAL EXAM:  General Appearance: Alert and no distress, appears stated age. In wheelchair.     QUALITY MEASURES:  The following are part of a depression follow up plan for the patient:  coping support assessment    ADDITIONAL HISTORY SUMMARIZED (2): None.  DECISION TO OBTAIN EXTRA  INFORMATION (1): None.   RADIOLOGY TESTS (1): None.  LABS (1): None.  MEDICINE TESTS (1): None.  INDEPENDENT REVIEW (2 each): None.     The visit lasted a total of 30 minutes face to face with the patient. Over 50% of the time was spent counseling and educating the patient about migraine management.    IRicardo, am scribing for and in the presence of, Dr. Nair.    I, Apoorva Nair, personally performed the services described in this documentation, as scribed by Ricardo Mandel in my presence, and it is both accurate and complete.    MEDICATIONS:  Current Outpatient Prescriptions   Medication Sig Dispense Refill     albuterol (PROAIR HFA;PROVENTIL HFA;VENTOLIN HFA) 90 mcg/actuation inhaler Inhale 2 puffs every 6 (six) hours as needed for wheezing. 1 Inhaler 0     ascorbic acid (VITAMIN C) 250 MG tablet Take 250 mg by mouth daily.       cholecalciferol, vitamin D3, 1,000 unit tablet Take 1,000 Units by mouth daily.       FLUoxetine (PROZAC) 10 MG capsule Take 3 capsules (30 mg total) by mouth daily. 270 capsule 0     inhalational spacing device Spcr daily as needed. With inhaler       inulin (FIBER GUMMIES) 2.5 gram Chew Chew 2 tablets daily.  0     levonorgestrel-ethinyl estradiol (ORSYTHIA) 0.1-20 mg-mcg per tablet Take 1 tablet by mouth daily. 84 tablet 3     pediatric multivitamin (FLINTSTONES) Chew chewable tablet Chew 1 tablet daily.       No current facility-administered medications for this visit.        Total data points: 0

## 2021-06-14 NOTE — PROGRESS NOTES
[facility] Daily Progress     Patient Name: Shelbi Johnson  Date: 12/4/2017  Visit #: 20/26 - [facility]  PTA visit #:    Referral Diagnosis: Somatoform disorder   Referring provider: Apoorva Nair MD Dr. Mark Gormley  Visit Diagnosis:     ICD-10-CM    1. Decreased functional mobility and endurance Z74.09    2. Weakness of both lower extremities R29.898    3. Poor tolerance for activity Z78.9          Assessment:   Patient's mobility and progress has fluctuated since last week. Today she presents in wheelchair and is unable to place >25% body weight in LE with ambulation in //. She c/o inability to feel her legs with standing and displays co-contraction of LE muscles during strengthening.   Discussed POC with patient today. Pt will be pursuing [facility] inpatient program as soon as a spot is available. The author of this note contacted Dr. Olmstead's team at [facility] and confirmed this plan. Discussed decreasing frequency of outpatient physical therapy visits to 2x/week or 1x/week until she is able to start inpatient therapy. Patient has been compliant with HEP. Decreasing frequency should not hinder patient's progress- but if she declines, she is appropriate to return to PT 3x/week.   Patient and mother in agreement with decreasing frequency to 2x/week.     Patient is benefitting from skilled physical therapy and is making steady progress toward functional goals.  Patient is appropriate to continue with skilled physical therapy intervention, as indicated by initial plan of care.    Goal Status:  Pt. will be independent with home exercise program in : 2 weeks;Met  Pt will: return to driving independently 7/7 days/week for 1 week in 12 weeks (has not tried)  Pt will: return to walking without AD without visible gait pattern to return to normal school function in 12 weeks  Pt will: ascend/descend 1 flight of stairs without HR with reciprocal pattern 7/7 days/week for 1 week to ascend stairs in  "her home in 12 weeks (goal met)  Pt will: perform all transfers independently without assistance or cues to return to normal daily activities in 12 weeks (goal met)    Plan / Patient Education:     Continue with initial plan of care.  Progress with home program as tolerated.    Continue to Review morning exercise routine and progress/modify as needed. (possible add balance exercises to morning routine when able to do so)     Subjective:   Patient reports she has been in her wheelchair chair most of the last 2 days. Progress has been variable over the last week. She is unable to walk at home- uses furniture for support as her walker does not fit easily in her home.     Objective:     Pt presents in wheelchair with her mother to appt today.      Treatment Today     TREATMENT MINUTES COMMENTS   Evaluation     Self-care/ Home management     Manual therapy     Neuromuscular Re-education 15   - Standing in // bars x 60 seconds with weight shift laterally R/L. Added weight shift A/P x 60 seconds.  - Ambulation in // with B UE support, approximately 75% of body weight through UE x 10 feet x 2 reps     Therapeutic Activity     Therapeutic Exercises 20   Nu-step WL 4, x 8:00 total (300 total steps), last 2 minutes B LE only    - Bridges x 10 reps  - Ab set with unilateral march x 20 reps   - Glut raises x 10 reps on orange swiss bal   - Mini hamstring curls on foam roller; pt c/o posterior LE pain and mm cramping as she actives both quads and hamstrings  - SLR x 10 reps with quad set; pt performing in partial range with 3\" lift from table      Established the following functional goals in collaboration with patient today:  1) ambulation at school with walker for at least 1/2 day     Gait training     Modality__________________                Total 35    Blank areas are intentional and mean the treatment did not include these items.     Talya Mcneal  12/4/2017  "

## 2021-06-14 NOTE — PROGRESS NOTES
[facility] Daily Progress     Patient Name: Shelbi Johnson  Date: 11/29/2017  Visit #: 18/26 - [facility]  PTA visit #:    Referral Diagnosis: Somatoform disorder   Referring provider: Apoorva Nair MD Dr. Mark Gormley  Visit Diagnosis:     ICD-10-CM    1. Decreased functional mobility and endurance Z74.09    2. Weakness of both lower extremities R29.898    3. Poor tolerance for activity Z78.9          Assessment:   Patient has been very inconsistent with her her symptoms in that every other day since last session she has had no mobility vs being able to walk without AD. She had hoped to be going inpatient at [facility] by Dec 1st but has not heard back from the staff about this. She had a full spasm of the right LE when performing heel slides with assistance from therapist and used a distraction of movement of the opposite limb to decrease the abnormal movements of the right side. Patient will continue to benefit from skilled PT to advance exercises at home as a routine to improved overall functional mobility.     Patient is benefitting from skilled physical therapy and is making steady progress toward functional goals.  Patient is appropriate to continue with skilled physical therapy intervention, as indicated by initial plan of care.    Goal Status:  Pt. will be independent with home exercise program in : 2 weeks;Met  Pt will: return to driving independently 7/7 days/week for 1 week in 12 weeks (has not tried)  Pt will: return to walking without AD without visible gait pattern to return to normal school function in 12 weeks  Pt will: ascend/descend 1 flight of stairs without HR with reciprocal pattern 7/7 days/week for 1 week to ascend stairs in her home in 12 weeks (goal met)  Pt will: perform all transfers independently without assistance or cues to return to normal daily activities in 12 weeks (goal met)    Plan / Patient Education:     Continue with initial plan of care.  Progress with home  program as tolerated.    Continue to Review morning exercise routine and progress/modify as needed. (possible add balance exercises to morning routine when able to do so)     Subjective:   Patient comes into states she has had no mobility in her legs at all today, she states she had a good day yesterday and was able to go to school without her walker and then this morning had no mobility. She tried to ambulate with her walker when she returned home from school but was unable to. She states Friday was a good day, Saturday she had no mobility, Sunday was a very good day but Monday was a bad day, yesterday a good day and today a bad day.    She has been trying to complete the morning exercises every day, and bike at least 3 times per day.     Objective:     Patient comes into the clinic today in her wheelchair unable to ambulate at all today  She is able to transfer from chair to NuStep and back independently and able to transfer from chair to mat without assistance.        Home exercise morning routine 11/20/17 - 11/27/17  Laying on your back:  - Quad set x 5 seconds x 10 reps   - Glut set x 5 seconds x 10 reps   - Heel slides x 5 reps on both sides (as much knee bend as possible)  - Bridges x 5 reps   Sitting (in your chair, edge of bed, kitchen chair)  - Seated marches x 10 reps on both sides   - Leg kicks (knee extension) x 10 reps on both sides  - Mini sit to stand with walker (lifting buttocks)   *transition to home nu-step or wall slides.       Treatment Today     TREATMENT MINUTES COMMENTS   Evaluation     Self-care/ Home management     Manual therapy     Neuromuscular Re-education     Therapeutic Activity     Therapeutic Exercises 40   Nu-step WL 4, x 8:00 total, UE and LE bilaterally today initially, did the last 3 minutes B LEs only with no resistance very slowly      Exercises performed in clinic toady:    - Quad sets - added towel under her knee 5 sec hold x 10 reps each side  - Gluteal sets - 5 sec hold x  "10 reps   - Heel slides with assistance - patient's right leg went into \"spasm\" and was shaking, tried to do quad sets but unable, sat at edge of mat and performed heel raises on left side to distract and \"pull energy\" from the right to stop the \"spasms\", completed 10 more heel slides on left side afterwards  - bridges x 10 reps  - brdiges with feet on BOSU x 10 reps     Standing in // bars x 30 sec  Attempted walking in bars with pateint using B UEs on bars to hold herself up and having her R > L leg buckle but she does not fall in clinic, walked length of bars x 1, mom pulled wheelchair behind     Established the following functional goals in collaboration with patient today:  1) walk at school without walker 1 of the next 2 days this week.           Gait training     Modality__________________                Total 40    Blank areas are intentional and mean the treatment did not include these items.     Denisha Baker  11/29/2017  "

## 2021-06-14 NOTE — PROGRESS NOTES
[facility] Daily Progress     Patient Name: Shelbi Johnson  Date: 11/10/2017  Visit #: 12/12-16 visits - [facility] visit #:    Referral Diagnosis: Somatoform disorder   Referring provider: Apoorva Nair MD Dr. Mark Gormley  Visit Diagnosis:     ICD-10-CM    1. Decreased functional mobility and endurance Z74.09    2. Weakness of both lower extremities R29.898    3. Poor tolerance for activity Z78.9          Assessment:   Patient was unable to ambulate with normal gait pattern at today's visit and required use of 4WW throughout. She does report improving mood and appetite today.   Patient is benefitting from skilled physical therapy and is making steady progress toward functional goals.  Patient is appropriate to continue with skilled physical therapy intervention, as indicated by initial plan of care.    Goal Status:  Pt. will be independent with home exercise program in : 2 weeks  Pt will: return to driving independently 7/7 days/week for 1 week in 12 weeks  Pt will: return to walking without AD without visible gait pattern to return to normal school function in 12 weeks  Pt will: ascend/descend 1 flight of stairs without HR with reciprocal pattern 7/7 days/week for 1 week to ascend stairs in her home in 12 weeks  Pt will: perform all transfers independently without assistance or cues to return to normal daily activities in 12 weeks    Plan / Patient Education:     Continue with initial plan of care.  Progress with home program as tolerated.    Subjective:   Patient reports she woke up Tuesday with full mobility and LE function but woke up on Wednesday without LE function.   She was also sick with a cold over the last 2 days.     Objective:     Pt presents today with 4WW.     Treatment Today     TREATMENT MINUTES COMMENTS   Evaluation     Self-care/ Home management     Manual therapy     Neuromuscular Re-education     Therapeutic Activity     Therapeutic Exercises 22   Nu-step WL 6, 6:00 total,  pt using B UE and LE.     Ex's performed today:    - Ab set with unilateral>bilateral lower x 15 reps, cues for continued abd activation  - Bridges with abduction L3 band x 10 reps B x 2 sets  - Glut raises with swiss ball x 10 reps  - Hamstring curls with swiss ball x 10 reps x 2 sets, cues for abd activation  - Prone walk out with swiss ball x 10 reps, cues to perform in shortened range due to lack of core strength.   - Supine bridge walk out with swiss ball x 10 reps; pt performing slowly with small LE steps and moderate instability         Gait training 6 Ambulation 300 feet with 4WW; cues for heel strike and upright posture    Modality__________________                Total 28    Blank areas are intentional and mean the treatment did not include these items.       Talya Mcneal  11/10/2017

## 2021-06-14 NOTE — PROGRESS NOTES
[facility] Daily Progress     Patient Name: Shelbi Johnson  Date: 11/20/2017  Visit #: 15 - [facility]  PTA visit #:    Referral Diagnosis: Somatoform disorder   Referring provider: Apoorva Nair MD Dr. Mark Gormley  Visit Diagnosis:     ICD-10-CM    1. Decreased functional mobility and endurance Z74.09    2. Weakness of both lower extremities R29.898    3. Poor tolerance for activity Z78.9          Assessment:   Patient has been seen for 15 visits thus far in physical therapy. Patient initially presented to PT in a wheelchair, unable to ambulate with moderate-poor strength in both extremities. She has been able to regain normal ambulation at the conclusion of the majority of her PT visits, but continues to wake in the morning with inability to move her legs and arms. She typically alternates between use of walker and wheelchair at school, and does not use any assistive device when at home. She is independent in all transfers. Pt saw PCP on 10/26/17 and increased Fluoxetine which has improved her overall affect and mood. Patient's mother notes her daughter's mood and appetite have improved.     Since her last PT visit, her ambulation has regressed. Her overall progress with regards to walking and LE strength has improved significantly since evaluation, but continues to vary from day to day. She was able to return to school on 11/17/17 with full LE function and no assistive device following PT appointment. However she woke the next morning without LE movement and continued paresthesia. At this time, pt does not have a formal plan to return to [facility] inpatient program although she would like to continue pursuing this.   Plan to extend initial POC for up to 10 additional PT visits 2-3x/week to continue establishing formal exercise routine and progress towards normal age activities.     Patient is benefitting from skilled physical therapy and is making steady progress toward functional goals.  Patient  is appropriate to continue with skilled physical therapy intervention, as indicated by initial plan of care.    Goal Status:  Pt. will be independent with home exercise program in : 2 weeks;Met  Pt will: return to driving independently 7/7 days/week for 1 week in 12 weeks (has not tried)  Pt will: return to walking without AD without visible gait pattern to return to normal school function in 12 weeks (goal partially met)  Pt will: ascend/descend 1 flight of stairs without HR with reciprocal pattern 7/7 days/week for 1 week to ascend stairs in her home in 12 weeks (goal met)  Pt will: perform all transfers independently without assistance or cues to return to normal daily activities in 12 weeks (goal met)    Plan / Patient Education:     Continue with initial plan of care.  Progress with home program as tolerated.  Review morning exercise routine and progress/modify as needed.    Subjective:   Patient reports she went to school on Friday without her walker and had full mobility all day and normal function.  She woke on Saturday with absence of LE movement and sensation and it has not improved since. Pt went to school in her wheelchair today.     Objective:     Ambulates with walker with >50% body weight UE support.     Home exercise morning routine re-established 11/20/17:  Lying on your back:  - Quad set x 5 seconds x 10 reps   - Glut set x 5 seconds x 10 reps   - Heel slides x 5 reps on both sides (as much knee bend as possible)  - Bridges x 5 reps   Sitting (in your chair, edge of bed, kitchen chair)  - Seated marches x 10 reps on both sides   - Leg kicks (knee extension) x 10 reps on both sides  - Mini sit to stand with walker (lifting buttocks)   *transition to home nu-step or wall slides.       Treatment Today     TREATMENT MINUTES COMMENTS   Evaluation     Self-care/ Home management     Manual therapy     Neuromuscular Re-education     Therapeutic Activity     Therapeutic Exercises 32   Nu-step WL 6, x 7:00  total, BUE and LE    Morning exercise routine established and performed as above.     Established the following functional goals in collaboration with patient today:    1) perform morning exercise routine tomorrow and the following day  2) return to walking at school this week without AD for a half day     Gait training     Modality__________________                Total 32    Blank areas are intentional and mean the treatment did not include these items.       Talya Grante  11/20/2017

## 2021-06-14 NOTE — PROGRESS NOTES
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 Mark Gormley  Visit Diagnosis:     ICD-10-CM    1. Decreased functional mobility and endurance Z74.09    2. Weakness of both lower extremities R29.898    3. Poor tolerance for activity Z78.9          Assessment:   Patient continues to wake in the morning with varying levels of lower extremity function - although she has consistently been able to achieve normal ambulation by the end of PT visit over the last 2 weeks. Established specific short term goals with patient today on walking without use of walker in order to wean from walker altogether. Patient's father expresses the pt has not been fully compliant with HEP; discussed patient will benefit from creating a \"morning\" exercise routine to be performed daily in order to prepare her for normal activity during the school day.    Patient is benefitting from skilled physical therapy and is making steady progress toward functional goals.  Patient is appropriate to continue with skilled physical therapy intervention, as indicated by initial plan of care.    Goal Status:  Pt. will be independent with home exercise program in : 2 weeks;Met  Pt will: return to driving independently 7/7 days/week for 1 week in 12 weeks (has not tried)  Pt will: return to walking without AD without visible gait pattern to return to normal school function in 12 weeks  Pt will: ascend/descend 1 flight of stairs without HR with reciprocal pattern 7/7 days/week for 1 week to ascend stairs in her home in 12 weeks (goal met)  Pt will: perform all transfers independently without assistance or cues to return to normal daily activities in 12 weeks (goal met)    Plan / Patient Education:     Continue with initial plan of care.  Progress with home program as tolerated.  Plan " "to review HEP and discuss more formal exercise routine for mornings.    Subjective:   Patient reports she feels her legs are improving- she has noticed her legs have felt \"shaky\" over the last 2-3 days. She has not tried walking at school without her walker yet.   Patient attends visit with her father, who states patient has been partially compliant with HEP. He shares he feels a morning exercise routine would be beneficial for patient.    Objective:     Pt presents today without 4WW, slight knee buckling initially, no gait deviation post treatment.    Treatment Today     TREATMENT MINUTES COMMENTS   Evaluation     Self-care/ Home management     Manual therapy     Neuromuscular Re-education 5 Star balance SLS with reaching into 4 quadrants F/B/side x 4 sets each LE.    Therapeutic Activity     Therapeutic Exercises 25   Nu-step WL 6, 6:00 total, pt using LE only.     LE strengthening performed today:    - SLR with 2lb weight added today x 15 reps- good demo today   - SLR sidelying with 2lb weight x 15 reps B- good demo, no deviations   - Glut raises with swiss ball x 10 reps  - Hamstring curls on foam roller x 10 reps x 2 sets    Treadmill walking with progression in speed and decrease in arm support:  1.8 mph x 2:00 B UE support  2.0 mph x 2:00 single UE support  2.2 mph without UE support     Established the following functional goals in collaboration with patient today:  1) Walk without her walker at school for 2 hours today- discussed leaving her walker at nurse's station for remainder of the day if possible    2) Walk at a wedding over the weekend without walker for the ceremony     3) Walk without walker as much as possible at home this weekend         Gait training     Modality__________________                Total 30    Blank areas are intentional and mean the treatment did not include these items.       Talya Mcneal  11/17/2017  "

## 2021-06-14 NOTE — PROGRESS NOTES
[facility] Daily Progress     Patient Name: Shelbi Johnson  Date: 12/13/2017  Visit #: 23/26 - [facility]  PTA visit #:    Referral Diagnosis: Somatoform disorder   Referring provider: Apoorva Niar MD Dr. Mark Gormley  Visit Diagnosis:     ICD-10-CM    1. Decreased functional mobility and endurance Z74.09    2. Weakness of both lower extremities R29.898    3. Poor tolerance for activity Z78.9          Assessment:   Patient has not made progress with regards to mobility in >1 week. She continues to present with B LE paralysis and paresthesia.   Her symptoms are however inconsistent as she is able to transfer without assistance and use LE on nu-step but is unable to stand without >50% weight on upper extremities. Min-modA required for advancing LE during ambulation today.   Patient is appropriate to continue with skilled physical therapy intervention, as indicated by initial plan of care.    Goal Status:  Pt. will be independent with home exercise program in : 2 weeks;Met  Pt will: return to driving independently 7/7 days/week for 1 week in 12 weeks (has not tried)  Pt will: return to walking without AD without visible gait pattern to return to normal school function in 12 weeks  Pt will: ascend/descend 1 flight of stairs without HR with reciprocal pattern 7/7 days/week for 1 week to ascend stairs in her home in 12 weeks (goal met)  Pt will: perform all transfers independently without assistance or cues to return to normal daily activities in 12 weeks (goal met)    Plan / Patient Education:     Continue with initial plan of care.  Progress with home program as tolerated.   Continue to review morning exercise routine and progress/modify as needed. (possible add balance exercises to morning routine when able to do so)     Subjective:   Patient reports no changes since last visit- she continues to use her wheelchair and presents with B LE paralysis and paresthesia.     Objective:     Pt presents in  wheelchair with her mother to appt today. Unable to move her legs actively.     Treatment Today     TREATMENT MINUTES COMMENTS   Evaluation     Self-care/ Home management     Manual therapy     Neuromuscular Re-education 24 - Sit>stand from chair using B UE support on // x 10 reps x 2 sets, CGA from therapist. Seated rest requested by pt between sets due to UE fatigue.   - Ambulation in // with min A from therapist and B UE support on bars x 10 feet x 6 reps total. therapist assist with advancing limb and mod A for blocking LE as she advancers contralateral limb.      Therapeutic Activity     Therapeutic Exercises 6   Nu-step WL 5 x 6:00 total, pt using single UE for pushing; active movement of her legs present.       Established the following functional goals in collaboration with patient today:  1) walk/stand with walker at home    Gait training     Modality__________________                Total 30    Blank areas are intentional and mean the treatment did not include these items.     Talya Aakre  12/13/2017

## 2021-06-14 NOTE — PROGRESS NOTES
[facility] Daily Progress     Patient Name: Shelbi Johnson  Date: 12/18/2017  Visit #: 24/26 - [facility]  PTA visit #:    Referral Diagnosis: Somatoform disorder   Referring provider: Apoorva Nair MD Dr. Mark Gormley  Visit Diagnosis:     ICD-10-CM    1. Decreased functional mobility and endurance Z74.09    2. Weakness of both lower extremities R29.898    3. Poor tolerance for activity Z78.9          Assessment:   Patient has not made progress with regards to mobility in >2 weeks. She presents in wheelchair to each visit without active movement of LE and c/o paresthesia. She was unable to ambulate today without assistance for advancing LE although transfers completely independently from chair<>nu-step and sit>stand.     Patient is receiving significant assistance from her family at home which may be contributing to her lack of progress. Pt states she is able to shower, feed herself, and dress herself independently when she is at home- but requires assistance from her mother to complete HEP, carry her food plate, prepare her shower materials and climb stairs for example. Educated patient thoroughly today she needs to complete her HEP independently without assistance from her parents. Discussed she needs to be as independent as possible with ADL's, even if it takes her longer to complete a task. Patient and father in agreement with discussion today.       Goal Status:  Pt. will be independent with home exercise program in : 2 weeks;Met  Pt will: return to driving independently 7/7 days/week for 1 week in 12 weeks (has not tried)  Pt will: return to walking without AD without visible gait pattern to return to normal school function in 12 weeks  Pt will: ascend/descend 1 flight of stairs without HR with reciprocal pattern 7/7 days/week for 1 week to ascend stairs in her home in 12 weeks (goal met)  Pt will: perform all transfers independently without assistance or cues to return to normal daily  activities in 12 weeks (goal met)    Plan / Patient Education:     Continue with initial plan of care.  Progress with home program as tolerated.     Subjective:   Patient reports she is frustrated overall today. She has not been able to walk or stand with her walker since last visit.   She c/o hand pain after hitting her hand on a counter top. Pt went to a play over the weekend but had to change seats due to inability to have handicap accessible seats.   Pt also reports her mother is helping her with all of her exercises- her mother helps her lift her hips with bridges and finish her AM exercise HEP.   She tried walking at her counter top and her mother had to help her advance both limbs.     Objective:     Pt presents in wheelchair with her father to appt today. Unable to move her legs actively although transfers independently.     Treatment Today     TREATMENT MINUTES COMMENTS   Evaluation     Self-care/ Home management     Manual therapy     Neuromuscular Re-education 25 - Sit>stand from chair using B UE support on // x 10 reps x 1 set, CGA from therapist. Pt displays poor eccentric control upon sitting.    - Ambulation in // with min A from therapist and B UE support on bars x 10 feet x 4 reps total. Therapist assist with advancing B LE today and mod A for blocking LE as she advancers contralateral limb.   Pt is able to advance her limbs 50% of steps, slowly, with poor foot clearance.     Discussion of progress:  Educated pt and her father pt needs to complete her HEP entirely on her own, without any assistance from her mother.   Discussed patient needs to be independent in ADL's as much as possible, as her mother is assisting her with all ADL's . Pt and father in agreement.      Therapeutic Activity     Therapeutic Exercises 6   Nu-step WL 5 x 6:00 total, pt using single UE for pushing; active movement of her legs present.       Established the following functional goals in collaboration with patient today:  1)  walk/stand with walker at home    Gait training     Modality__________________                Total 31    Blank areas are intentional and mean the treatment did not include these items.     Talya Mcneal  12/18/2017

## 2021-06-14 NOTE — PROGRESS NOTES
[facility] Daily Progress     Patient Name: Shelbi Johnson  Date: 12/11/2017  Visit #: 22/26 - [facility]  PTA visit #:    Referral Diagnosis: Somatoform disorder   Referring provider: Apoorva Nair MD Dr. Mark Gormley  Visit Diagnosis:     ICD-10-CM    1. Decreased functional mobility and endurance Z74.09    2. Weakness of both lower extremities R29.898    3. Poor tolerance for activity Z78.9          Assessment:   Patient's progress has declined since last treatment. She reports she has been experiencing worse paralysis this week- she has not walked with her walker since last PT treatment. Mother and patient also state she is not getting enough sleep and she is seeing a neurologist for her migraines.   Patient is appropriate to continue with skilled physical therapy intervention, as indicated by initial plan of care.    Goal Status:  Pt. will be independent with home exercise program in : 2 weeks;Met  Pt will: return to driving independently 7/7 days/week for 1 week in 12 weeks (has not tried)  Pt will: return to walking without AD without visible gait pattern to return to normal school function in 12 weeks  Pt will: ascend/descend 1 flight of stairs without HR with reciprocal pattern 7/7 days/week for 1 week to ascend stairs in her home in 12 weeks (goal met)  Pt will: perform all transfers independently without assistance or cues to return to normal daily activities in 12 weeks (goal met)    Plan / Patient Education:     Continue with initial plan of care.  Progress with home program as tolerated.   Continue to review morning exercise routine and progress/modify as needed. (possible add balance exercises to morning routine when able to do so)     Subjective:   Patient returns to PT expressing frustration with her school. She feels she does not receive help from other for reaching things/moving around/opening closing doors.   She saw a neurologist from [facility] on Friday who is having her keep a log  of her migraines.     Objective:     Pt presents in wheelchair with her mother to appt today. Unable to move her legs actively.     Treatment Today     TREATMENT MINUTES COMMENTS   Evaluation     Self-care/ Home management     Manual therapy     Neuromuscular Re-education     Therapeutic Activity     Therapeutic Exercises 34   Nu-step WL 1 x 8:00 total. Last 1:00 B LE only.     - Quad set x 5 seconds x 15 reps  - Glut set x 5 seconds x 15 reps  - Bridges in shortened range with adductor ball squeeze x 10 reps  - Bridges with lat pull down x 10 reps   - Hooklying russian twist with swiss ball under LE   - Glut raises x 10 reps on orange swiss ball  - Mini curl-up x 10 reps with 3 second hold   - Oblique curl up x 10 reps with 3 second hold, alternating     Established the following functional goals in collaboration with patient today:  1) walk/stand with walker at home      Gait training     Modality__________________                Total 34    Blank areas are intentional and mean the treatment did not include these items.     Talya Mcneal  12/11/2017

## 2021-06-15 NOTE — PROGRESS NOTES
[facility] Daily Progress     Patient Name: Shelbi Johnson  Date: 1/3/2018  Visit #: 26/26 - [facility]  PTA visit #:    Referral Diagnosis: Somatoform disorder   Referring provider: Apoorva Nair MD Dr. Mark Gormley  Visit Diagnosis:     ICD-10-CM    1. Decreased functional mobility and endurance Z74.09    2. Weakness of both lower extremities R29.898    3. Poor tolerance for activity Z78.9          Assessment:   Patient returns to PT without changes in LE strength or mobility. She continues to use her wheelchair at all times and reports new onset of wrist pain.   HEP was updated today. The author of this note contacted [facility] regarding inpatient program status, and [facility] does not currently have any openings. Pt remains on a waiting list for inpatient stay. Discussed patient's progress with Dr. Ramey's team. Given patient is not progressing in outpatient PT she is appropriate to d/c after next visit. Plan to FU in 2 weeks for final PT visit      Goal Status:  Pt. will be independent with home exercise program in : 2 weeks;Met  Pt will: return to driving independently 7/7 days/week for 1 week in 12 weeks (has not tried)  Pt will: return to walking without AD without visible gait pattern to return to normal school function in 12 weeks  Pt will: ascend/descend 1 flight of stairs without HR with reciprocal pattern 7/7 days/week for 1 week to ascend stairs in her home in 12 weeks (goal met)  Pt will: perform all transfers independently without assistance or cues to return to normal daily activities in 12 weeks (goal met)    Plan / Patient Education:     Continue with initial plan of care.  Progress with home program as tolerated.   Pt will be going on vacation for 2 weeks. Fu after.     Subjective:   Patient was on vacation in [place] for 1 week over the holidays.   Noticed recent onset of right wrist pain yesterday and presents today wearing a wrist immobilizer. States she cannot put weight on her  wrist and is going to [facility] for an x-ray after today's visit.   No changes in LE strength or mobility.     Objective:     Morning strengthening for updated HEP:  - Quad set x 5 seconds each leg x 10 reps each leg  - Glut set x 5 seconds x 10 times   - Heel slides x 10 each leg  - Abdominal set (lying on your back with knees bent) x 5 seconds x 10 reps   - Hip abduction (pushing legs out against towel) x 5 seconds x 10 reps  - Hip adduction (pushing knees in against pillow) x 5 seconds x 10 reps  - Bridges x 10 reps  - Seated marches x 10 reps each leg  - Seated leg kicks x 10 reps each   - Mini sit to stand  Stretching:  - Seated hamstring stretch (long sit) x 30 seconds x 2-3 reps      Pt presents in wheelchair without active movement of B LE.     Treatment Today     TREATMENT MINUTES COMMENTS   Evaluation     Self-care/ Home management     Manual therapy     Neuromuscular Re-education     Therapeutic Activity     Therapeutic Exercises 28 Updated HEP as above    Nu-step WL4,6:00   Gait training     Modality__________________                Total 28    Blank areas are intentional and mean the treatment did not include these items.     Talya Mcneal  1/3/2018

## 2021-06-15 NOTE — PROGRESS NOTES
[facility] Discharge Summary  Patient Name: Shelbi Johnson  Date: 3/6/2018  Referral Diagnosis: Somatoform disorder  Referring provider: Apoorva Nair MD Dr. Mark Gormley  Visit Diagnosis:   1. Decreased functional mobility and endurance     2. Weakness of both lower extremities     3. Poor tolerance for activity       Goal Status:   Pt. will be independent with home exercise program in : 2 weeks;  Met  Pt will: return to driving independently 7/7 days/week for 1 week in 12 weeks  Not met  Pt will: return to walking without AD without visible gait pattern to return to normal school function in 12 weeks Partially met  Pt will: ascend/descend 1 flight of stairs without HR with reciprocal pattern 7/7 days/week for 1 week to ascend stairs in her home in 12 weeks Partially met  Pt will: perform all transfers independently without assistance or cues to return to normal daily activities in 12 weeks Partially met    Patient was seen for 27 visits from initial evaluation to 1/15/18 with 0 missed appointments.  Patient is pursuing psychotherapy and additional services at [facility].     Therapy will be discontinued at this time.  The patient will need a new referral to resume.    Thank you for your referral.  Talya Juanitamatthew  3/6/2018  9:31 AM  [facility] Daily Progress     Patient Name: Shelbi Johnson  Date: 1/15/2018  Visit #: 27/27 - [facility] visit #:    Referral Diagnosis: Somatoform disorder   Referring provider: Apoorva Nair MD Dr. Mark Gormley  Visit Diagnosis:     ICD-10-CM    1. Decreased functional mobility and endurance Z74.09    2. Weakness of both lower extremities R29.898    3. Poor tolerance for activity Z78.9          Assessment:   Patient returns to PT without change in LE strength or mobility. Pt has been seen for 27 PT visits since evaluation. Initially, pt made progress and was able to attend school without any assistive device and full LE function for a number of days.  However her progress did not last. Over last 4 weeks, patient has not been able to regain LE function and uses wheelchair and walker at all times. Goals are not met at this time.  Patient and her mother are unsure if she will be entering [facility] inpatient program at this time, as results from psychology evaluation are pending. Pt should receive information from [facility] within next 2-3 days regarding inpatient status. Plan to hold chart open. No additional visits scheduled at this time.       Goal Status:   Pt. will be independent with home exercise program in : 2 weeks;  Met  Pt will: return to driving independently 7/7 days/week for 1 week in 12 weeks  Not met  Pt will: return to walking without AD without visible gait pattern to return to normal school function in 12 weeks Partially met  Pt will: ascend/descend 1 flight of stairs without HR with reciprocal pattern 7/7 days/week for 1 week to ascend stairs in her home in 12 weeks Partially met  Pt will: perform all transfers independently without assistance or cues to return to normal daily activities in 12 weeks Partially met    Plan / Patient Education:     Hold chart open until further communication is established from [facility].     Subjective:   Patient returns to PT with her mother- both reporting they met with [facility] psychology today.   Patient reports feeling frustrated with this meeting. Pt's mother reports [facility] is trying to determine if the patient is appropriate for inpatient treatment for her conversion disorder.  Per pt, she met with a separate psychologist from patient's mother and father. Patient expresses she feels her meeting did not go well. Pt also saw a neurologist at [facility] and the neurologist would like to perform a full spine MRI due to decreased reflexes.  Patient also went to ED yesterday after falling and hitting her right wrist. C/o wrist pain on ulnar aspect of arm today which is different than previous wrist pain  complaint. X-ray was negative. Pt will be seeing [facility] today again regarding wrist pain.     Objective:     Pt presents in wheelchair without active movement of B LE.     Treatment Today     TREATMENT MINUTES COMMENTS   Evaluation     Self-care/ Home management     Manual therapy     Neuromuscular Re-education     Therapeutic Activity     Therapeutic Exercises 32   Nu-step WL4, 6:00    Performed during session today:   - Glut sets x 5 seconds x 10 reps  - Quad sets x 5 seconds x 10 reps  - Ab set with marching x 20 reps- pt unable to lift feet from mat even with TC and VC  - Curl up x 3 seconds x 15 reps  - Oblique curl up x 3 seconds x 15 reps  - SLR with quad set x 10 reps B; pt unable to fully lift heel, co-contraction of quad/hamstring present   - Sit>stand from mat to walker x 10 reps; pt unable to fully extend B knee upon standing, staying forward flexed despite cues for glut activation.  Pt states she cannot bear weight on right wrist despite encouragement from therapist to use both arms and both legs to stand with upright posture.      Gait training     Modality__________________                Total 32    Blank areas are intentional and mean the treatment did not include these items.     Talya Mcneal  1/15/2018

## 2021-06-16 NOTE — PROGRESS NOTES
Karen Johnson's mother, Alyce, was contacted by the author of this note regarding patient's progress and follow up. Patient was not admitted into inpatient program for conversion disorder at [facility]. She has had variable mobility since last seen in physical therapy. Patient's mother states the patient is no longer following up at [facility], but is pursuing additional PT/OT/psychotherapy through [facility]. Pt will be discharged from [facility] physical therapy services at this time.    Karen Mcneal, PT, DPT

## 2021-06-17 NOTE — PATIENT INSTRUCTIONS - HE
Patient Instructions by Niko Junior Scribe at 1/4/2019  9:20 AM     Author: Niko Junior Scribe Service: -- Author Type: Shay    Filed: 1/4/2019 10:21 AM Encounter Date: 1/4/2019 Status: Addendum    : Apoorva Nair MD (Physician)    Related Notes: Original Note by Apoorva Nair MD (Physician) filed at 1/4/2019 10:08 AM       Schedule appointment with nurse midwives during the week of your period  [phone]    Continue taking birth control pill until then    You should have physicals yearly    You next tetanus shot is due in 2021  If you have any kind of cut or puncture wound before then, you should get your tetanus shot at that time.     You do not need a Pap smear until you are 21    Think about advanced directives, medical decision making, if you are not able.  You should discuss this with your parents.    [place] and Vote!          Patient Education             [facility] Patient Handout   18 to 21 Year Visits     Your Daily Life    Visit the dentist at least twice a year.    Protect your hearing at work, home, and concerts.    Eat a variety of healthy foods.    Eat breakfast every morning.    Drink plenty of water.    Make sure to get enough calcium.    Have 3 or more servings of low-fat (1%) or fat-free milk and other low-fat dairy products each day.    Aim for 1 hour of vigorous physical activity.    Be proud of yourself when you do something well.  Healthy Behavior Choices    Support friends who choose not to use drugs, alcohol, tobacco, steroids, or diet pills.    If you use drugs or alcohol, you can talk to us about it. We can help you with quitting or cutting down on your use.    Make healthy decisions about your sexual behavior.    If you are sexually active, always practice safe sex. Always use a condom to prevent STIs.    All sexual activity should be something you want. No one should ever force or try to convince you.    Find safe activities at school and in the  community. Violence and Injuries    Do not drink and drive or ride in a vehicle with someone who has been using drugs or alcohol.    If you feel unsafe driving or riding with someone, call someone you trust to drive you.    Always wear a seat belt in the car.    Know the rules for safe driving.    Never allow physical harm of yourself or others at home or school.    Always deal with conflict using nonviolence.    Remember that healthy dating relationships are built on respect and that saying no is OK.    Fighting and carrying weapons can be dangerous.  Your Feelings    Figure out healthy ways to deal with stress.    Try your best to solve problems and make decisions on your own.    Most people have daily ups and downs. But if you are feeling sad, depressed, nervous, irritable, hopeless, or angry, talk with me or another health professional.    We understand sexuality is an important part of your development. If you have any questions or concerns, we are here for you. School and Friends    Take responsibility for being organized enough to succeed in work or school.    Find new activities you enjoy.    Consider volunteering and helping others in the community on an issue that interests or concerns you.    Form healthy friendships and find fun, safe things to do with friends.    As you get older, making and keeping friends is important. You may find that you drift away from some of your old friends--thats normal.    Evaluate your friendships and keep those that are healthy.    It is still important to stay connected with your family.

## 2021-06-18 NOTE — LETTER
Letter by Apoorva Nair MD at      Author: Apoorva Nair MD Service: -- Author Type: --    Filed:  Encounter Date: 1/8/2019 Status: (Other)        Shelbi Johnson  [address] [facility] [address]             January 8, 2019         Dear Shelbi Johnson,    Below are the results from your recent visit:    Resulted Orders   HIV Antigen/Antibody Screening Cascade   Result Value Ref Range    HIV Antigen / Antibody Negative Negative    Narrative    Method is Abbott HIV Ag/Ab for the detection of HIV p24 antigen, HIV-1 antibodies and HIV-2 antibodies.   Lipid Cascade RANDOM   Result Value Ref Range    Cholesterol 191 <=199 mg/dL    Triglycerides 91 <=149 mg/dL    HDL Cholesterol 60 >=50 mg/dL    LDL Calculated 113 <=129 mg/dL    Patient Fasting > 8hrs? Unknown    Syphilis Screen, Cascade   Result Value Ref Range    Treponema Antibody (Syphilis) Negative Negative   Chlamydia trachomatis & Neisseria gonorrhoeae, Amplified Detection   Result Value Ref Range    Chlamydia trachomatis, Amplified Detection Negative Negative    Neisseria gonorrhoeae, Amplified Detection Negative Negative     All of your results are normal.     Please call with questions or contact us using [employer].    Sincerely,        Electronically signed by Apoorva Nair MD

## 2021-06-21 NOTE — PROGRESS NOTES
WALK IN CARE - VISIT NOTE    HPI    17 yo female presenting for evaluation of:    1. Concern for UTI  - 3 days of symptoms  - dysuria  - increased frequency and urgency  - no fevers/chills/sweats  - cloudy urine  - no blood      ROS  Negative except as noted in HPI    OBJECTIVE    Vitals  Vitals:    11/23/18 1446   BP: 120/76   Pulse: 85   Resp: 14   Temp: 98.2  F (36.8  C)   SpO2: 98%     Physical Exam  General: No acute distress  CV: RRR, distal and peripheral pulses in tact  Resp: CTA bilaterally, no wheezing, no rhonchi, no rhales, no respiratory distress  Back: no CVA tenderness    Labs  UA  + leukocytes  + blood  + bacteria   UCx pending      ASSESSMENT/PLAN      # Cystitis  - bactrim DS two times a day x3 days  - symptoms with which to go to ER discussed

## 2021-06-22 NOTE — PROGRESS NOTES
[facility] Well Child Check    ASSESSMENT & PLAN  Shelbi Johnson is a 18 y.o. who has normal growth and normal development.    Diagnoses and all orders for this visit:    Encounter for annual health examination  -     HIV Antigen/Antibody Screening Cascade  -     Lipid Cascade RANDOM  -     Hearing Screening  -     Syphilis Screen, Cascade  -     Chlamydia trachomatis & Neisseria gonorrhoeae, Amplified Detection    Dysmenorrhea    Encounter for counseling regarding contraception    Conversion disorder        Return to clinic in 1 year for a physical or sooner as needed    IMMUNIZATIONS/LABS  No immunizations due today.    REFERRALS  Dental:  The patient has already established care with a dentist.  Other:  Referrals were made for CNM for nexplanon    ANTICIPATORY GUIDANCE  I have reviewed age appropriate anticipatory guidance.  Social:  Friends  Parenting:  Support  Nutrition:  Dieting  Play and Communication:  Hobbies  Health:  Sleep  Safety:  Seat Belts  Sexuality:  Safe Sex    HEALTH HISTORY  Do you have any concerns that you'd like to discuss today?: BC check      Patient reports to be having menstrual cramps. She notes the cramps happen half a week before the start of her period, lasting until half week after her period and are worse within those two intervals. Patient is currently on birth control pills. She started taking them in 2016. She also reports it is difficult to take her pill on time every day because of her busy academic schedule and is looking into implant contraceptive.    Patient also reports to have had 5 non-epileptic seizures in the last 3 months because of her conversion disorder. She states her seizures can be predictable, occurring with stress, and sometimes happen randomly. She was recently evaluated for this at [place].She is no longer having problems with walking.     Patient also reports to be working with a pshycologist to decrease stress. She is coming back home once, sometimes twice  "a month to see psychologist. She adds that her school offers counseling and will see them if needed. She adds her favorite therapy at school is \"puppy therapy\". She has minimal stress from academics, thinks classes are easier than some of the AP classes she took in high school.    Patient recently switched roommates because of privacy reasons and room cleanliness. She will be rooming with a classmate from her Romanian class.    Mother wants the patient's cholesterol level to be checked today because high cholesterol runs in the family.    She has had two male sexual partners in her lifetime. She reports she always uses condoms.     Patient is getting 10 hours of sleep every night. She also notes she lost weight after getting off the wheel chair but has been working out at home to build muscle. She adds to only take her inhaler before physical activities.    Patient currently attends [employer] and is majoring in accounting. She reports to be doing well in academics and states school is easier than expected.    Roomed by: Robyn    Refills needed? No    Do you have any forms that need to be filled out? No        Do you have any significant health concerns in your family history?: No  Family History   Problem Relation Age of Onset     Depression Mother      Hyperlipidemia Mother      Osteoarthritis Mother      Other Mother         Spinal stenosis     No Medical Problems Father      No Medical Problems Brother      Other Maternal Grandmother         Spinal stenosis     Paget's disease of bone Maternal Grandmother      Cardiomyopathy Maternal Grandmother         viral     Colon cancer Maternal Grandmother         Unsure if mass was cancerous or not      Alzheimer's disease Maternal Grandfather      Hypertension Maternal Grandfather      Hyperlipidemia Maternal Grandfather      Transient ischemic attack Maternal Grandfather      Breast cancer Paternal Grandmother      Stroke Paternal Grandfather  "     Since your last visit, have there been any major changes in your family, such as a move, job change, separation, divorce, or death in the family?: No  Has a lack of transportation kept you from medical appointments?: No    Home  Who lives in your home?:  Mom, dad, 1 brother, 1 cat and 1 dog   Social History     Social History Narrative    She lives with her mother, father, and her older brother who attends the [employer]. The family has a dog and cat at home.      Do you have any concerns about losing your housing?: No  Is your housing safe and comfortable?: Yes  Do you have any trouble with sleep?:  No    Education  What school do you child attend?: [employer]   What grade are you in?:  Freshman   How do you perform in school (grades, behavior, attention, homework?: Well      Eating  Do you eat regular meals including fruits and vegetables?:  Needs some work, does not eat them often   What are you drinking (cow's milk, water, soda, juice, sports drinks, energy drinks, etc)?: water and only a little bit of milk if with cereal   Have you been worried that you don't have enough food?: No  Do you have concerns about your body or appearance?:  No    Activities  Do you have friends?:  yes  Do you get at least one hour of physical activity per day?:  yes  How many hours a day are you in front of a screen other than for schoolwork (computer, TV, phone)?:  5-6  What do you do for exercise?:  Walking, working out and focusing on abs    Do you have interest/participate in community activities/volunteers/school sports?:  no    MENTAL HEALTH SCREENING  PHQ-2 Total Score: 0 (1/4/2019 10:00 AM)    PHQ-9 Total Score: 1 (1/4/2019 10:00 AM)      VISION/HEARING  Vision: Patient is already followed by a vision specialist  Hearing:  Completed. See Results     Hearing Screening    125Hz 250Hz 500Hz 1000Hz 2000Hz 3000Hz 4000Hz 6000Hz 8000Hz   Right ear:   20 20 20  20     Left ear:   20 20 20  20         TB Risk Assessment:  The  "patient and/or parent/guardian answer positive to:  self or family member has traveled outside of the [place] in the past 12 months  patient and/or parent/guardian answer 'no' to all screening TB questions  Went to [place] this past July     Dyslipidemia Risk Screening  Have either of your parents or any of your grandparents had a stroke or heart attack before age 55?: No: does not believe so   Any parents with high cholesterol or currently taking medications to treat?: Yes: believes her mother is      Dental  When was the last time you saw the dentist?: Will be going next week    Parent/Guardian declines the fluoride varnish application today. Fluoride not applied today.    Patient Active Problem List   Diagnosis     Renal Medullary Necrosis     Intermittent Asthma     Chronic Kidney Disease, Stage 2     Type IV Renal Tubular Acidosis     Hypermobility Syndrome     Fasciculations of muscle     Transient Tic Disorder     Acne     Nasal Passage Blockage (Stuffiness)     Allergic Reaction     Conversion disorder     Dysmenorrhea     Migraine headache without aura     Hypercholesteremia     Recurrent major depression in complete remission (H)     Convulsion, non-epileptic (H)       Drugs  Does the patient use tobacco/alcohol/drugs?:  no    Safety  Does the patient have any safety concerns (peer or home)?:  no  Does the patient use safety belts, helmets and other safety equipment?:  yes    Sex  Have you ever had sex?:  Yes    MEASUREMENTS  Height:  5' 5\" (1.651 m)  Weight: 114 lb 3.2 oz (51.8 kg)  BMI: Body mass index is 19 kg/m .  Blood Pressure: 112/78  Blood pressure percentiles are 50 % systolic and 90 % diastolic based on the 2017 AAP Clinical Practice Guideline. Blood pressure percentile targets: 90: 126/78, 95: 129/82, 95 + 12 mmH/94.    PHYSICAL EXAM  Constitutional: Appears well-developed and well-nourished.   HEENT: Head: Normocephalic.    Right Ear: Tympanic membrane, external ear and canal normal. "    Left Ear: Tympanic membrane, external ear and canal normal.    Nose: Nose normal.    Mouth/Throat: Mucous membranes are moist. Oropharynx is clear.    Eyes: Conjunctivae and lids are normal. Pupils are equal, round, and reactive to light.   Neck: Neck supple. No tenderness is present.   Cardiovascular: Normal rate and regular rhythm. No murmur heard.  Pulmonary/Chest: Effort normal and breath sounds normal. There is normal air entry. Breast development is normal.  Camron stage 5.   Abdominal: Soft. There is no hepatosplenomegaly. No inguinal hernia.   Musculoskeletal: Normal range of motion. Normal strength and tone. No abnormalities. Spine is straight. Normal duck walk.  Normal heel to toe walk.   : Normal external genitalia.Camron stage 5.   Neurological: Alert, normal reflexes. Gait normal.   Psychiatric: Normal mood and affect, speech and behavior normal.  Skin: Minimal acne    ADDITIONAL HISTORY SUMMARIZED (2): None.   DECISION TO OBTAIN EXTRA INFORMATION (1): None.  RADIOLOGY TESTS (1): None.  LABS (1): Lipid test ordered.  MEDICINE TESTS (1): None.  INDEPENDENT REVIEW (2 each): None.     Total data points = 0    Total time was 22 minutes, greater than 50% counseling and coordinating care regarding the above issues.    By signing my name below, I, Niko Junior, attest that this documentation has been prepared under the direction and in the presence of Dr. Apoorva Nair.  Electronic Signature: Shay Lorenzana. 1/4/2019 9:55 AM.    I, Dr. Apoorva Nair, personally performed the services described in this documentation. All medical record entries made by the scribe were at my direction and in my presence. I have reviewed the chart and discharge instructions (if applicable) and agree that the record reflects my personal performance and is accurate and complete.
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
## 2021-06-23 NOTE — TELEPHONE ENCOUNTER
"Telephone encounter: I called and left a voicemail for the patient letting her know that I \"had the answer to the question she left clinic with\" and to please call me back at [phone].  Patient does not have my chart that she responds to as she is a minor.      Information not left on the voicemail was the answer to her question about the safety of an IUD being inserted with her diagnosis of hypermobility syndrome.    ABNER Hutchinson,CNM    "

## 2021-06-23 NOTE — TELEPHONE ENCOUNTER
Telephone call placed to patient.  Advised that Rx has been sent to her pharmacy.  Instructions for use reviewed with patient.  Patient verbalizes understanding to all and has no further questions or concerns at this time.

## 2021-06-23 NOTE — TELEPHONE ENCOUNTER
Name of caller: Patient  Phone number where you may be reached: [phone]  Reason for call: pt is getting iud on 1/21, she was told that she may need a cervix softening med to take 24 hours before iud insertion and again 1 hour before. [facility] is her preferred pharmacy. Pls call her to let her know if it is called in.  Best time to call back: any  If we don't reach you, is it okay to leave a detailed message? yes

## 2021-06-23 NOTE — PROGRESS NOTES
IUD Insertion Procedure Note    Indications: contraception    IUD Information:  Mirena Lot # [id number], Expiration date 4/2021.    Procedure Details   Urine pregnancy test was done and negative.  GC/CT cultures collected today. The risks (including infection, bleeding, pain, and uterine perforation) and benefits of the procedure were explained to the patient and Written informed consent was obtained.  Verbal pre-procedural time out was completed with patient.     Cervix cleansed with Betadine. Uterus sounded to 6.5 cm. IUD inserted without difficulty. String visible and trimmed to 2.5 cm. Patient tolerated procedure well.    Condition:  Stable    Complications:  None    Plan:    1. Taught and encouraged to check her IUD strings monthly, ideally after menses if she has them.   2. She was advised to call for any fever or for prolonged or severe pain or bleeding. Warning signs (P-A-I-[id number]) with use of IUD reviewed reviewed and h/o given regarding when to call CNM if problems or concerns.   3. She was advised to use OTC analgesics and heat as needed for discomfort.  4. Recommended condom use if IC is desire or abstinence for one week.   5. RTC in 1-2 months for routine IUD check appointment.     Total time spent with patient 40 minutes.  >50% of the time spent counseling and coordinating care.    Volodymyr LEVINE CNM    1/21/2019  5:03 PM

## 2021-09-18 ENCOUNTER — HEALTH MAINTENANCE LETTER (OUTPATIENT)
Age: 21
End: 2021-09-18

## 2021-09-22 ENCOUNTER — OFFICE VISIT - RIVER FALLS (OUTPATIENT)
Dept: FAMILY MEDICINE | Facility: CLINIC | Age: 21
End: 2021-09-22

## 2021-09-22 ASSESSMENT — MIFFLIN-ST. JEOR: SCORE: 1391.84

## 2021-09-23 ENCOUNTER — COMMUNICATION - RIVER FALLS (OUTPATIENT)
Dept: FAMILY MEDICINE | Facility: CLINIC | Age: 21
End: 2021-09-23

## 2021-09-23 LAB
A/G RATIO - HISTORICAL: 1.8 (ref 1–2.5)
ALBUMIN SERPL-MCNC: 4.4 GM/DL (ref 3.6–5.1)
ALBUMIN UR-MCNC: NEGATIVE G/DL
ALP SERPL-CCNC: 64 UNIT/L (ref 31–125)
ALT SERPL W P-5'-P-CCNC: 13 UNIT/L (ref 6–29)
APPEARANCE UR: CLEAR
AST SERPL W P-5'-P-CCNC: 16 UNIT/L (ref 10–30)
BACTERIA #/AREA URNS HPF: NORMAL /HPF
BASOPHILS # BLD MANUAL: 38 10*3/UL (ref 0–200)
BASOPHILS NFR BLD MANUAL: 0.6 %
BILIRUB SERPL-MCNC: 0.7 MG/DL (ref 0.2–1.2)
BILIRUB UR QL STRIP: NEGATIVE
BUN SERPL-MCNC: 17 MG/DL (ref 7–25)
BUN/CREAT RATIO - HISTORICAL: NORMAL (ref 6–22)
CALCIUM SERPL-MCNC: 9.8 MG/DL (ref 8.6–10.2)
CHLORIDE BLD-SCNC: 104 MMOL/L (ref 98–110)
CO2 SERPL-SCNC: 26 MMOL/L (ref 20–32)
COLOR UR AUTO: YELLOW
CREAT SERPL-MCNC: 1.06 MG/DL (ref 0.5–1.1)
EGFRCR SERPLBLD CKD-EPI 2021: 75 ML/MIN/1.73M2
EOSINOPHIL # BLD MANUAL: 128 10*3/UL (ref 15–500)
EOSINOPHIL NFR BLD MANUAL: 2 %
ERYTHROCYTE [DISTWIDTH] IN BLOOD BY AUTOMATED COUNT: 11.6 % (ref 11–15)
GLOBULIN: 2.4 (ref 1.9–3.7)
GLUCOSE BLD-MCNC: 77 MG/DL (ref 65–99)
GLUCOSE UR STRIP-MCNC: NEGATIVE MG/DL
HCT VFR BLD AUTO: 40.9 % (ref 35–45)
HGB BLD-MCNC: 14.3 GM/DL (ref 11.7–15.5)
HGB UR QL STRIP: NEGATIVE
HYALINE CASTS #/AREA URNS LPF: NORMAL /LPF
KETONES UR STRIP-MCNC: NEGATIVE MG/DL
LEUKOCYTE ESTERASE UR QL STRIP: NEGATIVE
LYMPHOCYTES # BLD MANUAL: 1568 10*3/UL (ref 850–3900)
LYMPHOCYTES NFR BLD MANUAL: 24.5 %
MCH RBC QN AUTO: 32.8 PG (ref 27–33)
MCHC RBC AUTO-ENTMCNC: 35 GM/DL (ref 32–36)
MCV RBC AUTO: 93.8 FL (ref 80–100)
MONOCYTES # BLD MANUAL: 486 10*3/UL (ref 200–950)
MONOCYTES NFR BLD MANUAL: 7.6 %
NEUTROPHILS # BLD MANUAL: 4179 10*3/UL (ref 1500–7800)
NEUTROPHILS NFR BLD MANUAL: 65.3 %
NITRATE UR QL: NEGATIVE
PH UR STRIP: 6 [PH] (ref 5–8)
PLATELET # BLD AUTO: 229 10*3/UL (ref 140–400)
PMV BLD: 11.5 FL (ref 7.5–12.5)
POTASSIUM BLD-SCNC: 4.4 MMOL/L (ref 3.5–5.3)
PROT SERPL-MCNC: 6.8 GM/DL (ref 6.1–8.1)
RBC # BLD AUTO: 4.36 10*6/UL (ref 3.8–5.1)
RBC #/AREA URNS AUTO: NORMAL /HPF
SODIUM SERPL-SCNC: 140 MMOL/L (ref 135–146)
SP GR UR STRIP: 1.01 (ref 1–1.03)
SQUAMOUS #/AREA URNS AUTO: NORMAL /HPF
TSH SERPL DL<=0.005 MIU/L-ACNC: 0.47 MIU/L
WBC # BLD AUTO: 6.4 10*3/UL (ref 3.8–10.8)
WBC #/AREA URNS AUTO: NORMAL /HPF

## 2021-09-28 ENCOUNTER — COMMUNICATION - RIVER FALLS (OUTPATIENT)
Dept: FAMILY MEDICINE | Facility: CLINIC | Age: 21
End: 2021-09-28

## 2021-10-01 ENCOUNTER — OFFICE VISIT - RIVER FALLS (OUTPATIENT)
Dept: FAMILY MEDICINE | Facility: CLINIC | Age: 21
End: 2021-10-01

## 2021-10-01 ASSESSMENT — MIFFLIN-ST. JEOR: SCORE: 1391.84

## 2021-10-06 ENCOUNTER — OFFICE VISIT - RIVER FALLS (OUTPATIENT)
Dept: FAMILY MEDICINE | Facility: CLINIC | Age: 21
End: 2021-10-06

## 2021-10-21 LAB
ALBUMIN UR-MCNC: NEGATIVE G/DL
APPEARANCE UR: CLEAR
BILIRUB UR QL STRIP: NEGATIVE
COLOR UR AUTO: YELLOW
GLUCOSE UR STRIP-MCNC: NEGATIVE MG/DL
HCT VFR BLD AUTO: 42.4 %
HGB BLD-MCNC: 14.1 G/DL
HGB UR QL STRIP: NEGATIVE
KETONES UR STRIP-MCNC: NEGATIVE MG/DL
LEUKOCYTE ESTERASE UR QL STRIP: NEGATIVE
NITRATE UR QL: NEGATIVE
PH UR STRIP: 5.5 [PH]
PLATELET # BLD AUTO: 188 X10^3/UL
SP GR UR STRIP: 1.01
UROBILINOGEN UR STRIP-MCNC: NORMAL MG/DL
WBC # BLD AUTO: 4.71 X10^3/UL

## 2021-12-13 ENCOUNTER — OFFICE VISIT (OUTPATIENT)
Dept: FAMILY MEDICINE | Facility: CLINIC | Age: 21
End: 2021-12-13
Payer: COMMERCIAL

## 2021-12-13 VITALS
BODY MASS INDEX: 23.68 KG/M2 | TEMPERATURE: 98.6 F | DIASTOLIC BLOOD PRESSURE: 84 MMHG | OXYGEN SATURATION: 97 % | WEIGHT: 140.4 LBS | HEART RATE: 89 BPM | SYSTOLIC BLOOD PRESSURE: 120 MMHG | RESPIRATION RATE: 18 BRPM

## 2021-12-13 DIAGNOSIS — J10.1 INFLUENZA A: Primary | ICD-10-CM

## 2021-12-13 LAB
DEPRECATED S PYO AG THROAT QL EIA: NEGATIVE
FLUAV AG SPEC QL IA: POSITIVE
FLUBV AG SPEC QL IA: NEGATIVE
GROUP A STREP BY PCR: NOT DETECTED

## 2021-12-13 PROCEDURE — 99213 OFFICE O/P EST LOW 20 MIN: CPT | Performed by: FAMILY MEDICINE

## 2021-12-13 PROCEDURE — 87651 STREP A DNA AMP PROBE: CPT | Performed by: FAMILY MEDICINE

## 2021-12-13 PROCEDURE — 87804 INFLUENZA ASSAY W/OPTIC: CPT | Performed by: FAMILY MEDICINE

## 2021-12-13 RX ORDER — OSELTAMIVIR PHOSPHATE 75 MG/1
75 CAPSULE ORAL 2 TIMES DAILY
Qty: 10 CAPSULE | Refills: 0 | Status: SHIPPED | OUTPATIENT
Start: 2021-12-13 | End: 2021-12-18

## 2021-12-13 NOTE — PROGRESS NOTES
Assessment & Plan     Shelbi was seen today for cough.    Diagnoses and all orders for this visit:    Influenza A  -     Streptococcus A Rapid Screen w/Reflex to PCR - Clinic Collect  -     Group A Streptococcus PCR Throat Swab  -     Influenza A & B Antigen - Clinic Collect  -     oseltamivir (TAMIFLU) 75 MG capsule; Take 1 capsule (75 mg) by mouth 2 times daily for 5 days      Discussed risks and benefits of treatment strategies.    Patient Instructions     Over-the-counter medications, only as directed.    Quarantine precautions, as discussed.    Tamiflu, as prescribed.    Follow up if:  fever lasting more than an additional 24-48 hours, worsening symptoms, or not improving over the next week.    Follow up in the ER immediately if you develop:  severe/worsening chest pain, breathing concerns, severe/worsening headache, uncontrolled vomiting, signs/symptoms of dehydration, or other emergent symptoms.         Return for Follow up, as noted in Patient Instructions.    Mariah Chand MD  [facility]    Sushila Mario is a 21 year old female who presents to clinic today for the following health issues:  Chief Complaint   Patient presents with     Cough     Cough, vomiting, sore throat, and fever 100.7 x 2days on 12/12/21 - Did rapid COVID test and was negative      HPI    URI Adult    Onset of symptoms was 2 days ago.  Course of illness is same.    Current and Associated symptoms: fever (100.7 degrees Fahrenheit x 2 days), chills, nasal congestion, postnasal drainage, sore throat (mild, resolving), vomiting (a single episode of posttussive emesis), and cough (productive of greenish sputum).  Patient denies the following symptoms: headache, facial pressure, decreased appetite, decreased urine output, chest pain, shortness of breath, wheezing, or abdominal pain  Treatment measures tried include:   -DayQuil and NyQuil, with some relief.  Additional History:  -History of  exercise-induced asthma, without recent Albuterol use.  -History of tonsillectomy and CKD Stage 2.  -A Rapid COVID-19 test was negative.  -Patient has been vaccinated for Influenza and COVID-19.    Review of Systems   Mirena IUD in place.  Denies pregnancy or breastfeeding.      Objective    /84   Pulse 89   Temp 98.6  F (37  C) (Tympanic)   Resp 18   Wt 63.7 kg (140 lb 6.4 oz)   SpO2 97%   BMI 23.68 kg/m    GENERAL APPEARANCE:  Awake, alert, and in no acute distress.  HEENT:  Sclera anicteric.  No conjunctivitis.  PERRLA.  Extraocular movements are intact.  Bilateral TM's and canals are within normal limits.  Mild nasal congestion, with clear postnasal drainage  No sinus tenderness.  Mild erythema in the posterior pharynx.  No edema or exudates of the oral mucosa or posterior pharynx.  No trismus.  Mucous membranes moist.  NECK:  Spontaneous full range of motion.  No nuchal rigidity.  No thyromegaly or mass.  No lymphadenopathy.  HEART:  Normal S1, S2.  Regular rate and rhythm.  No murmurs, rubs, or gallops.  LUNGS:  No respiratory distress.  No wheezes, rales, or rhonchi.  ABDOMEN:  Not distended.    EXTREMITIES:  Moves 4 extremities.     SKIN:  No rash.        LABORATORY:  Office Visit on 12/13/2021   Component Date Value Ref Range Status     Group A Strep antigen 12/13/2021 Negative  Negative Final     Influenza A antigen 12/13/2021 Positive* Negative Final     Influenza B antigen 12/13/2021 Negative  Negative Final      Last Creatinine was 1.10 (GFR > 60) on 10/31/2019.    Patient declines a follow-up Creatinine.

## 2021-12-13 NOTE — PATIENT INSTRUCTIONS
Over-the-counter medications, only as directed.    Quarantine precautions, as discussed.    Tamiflu, as prescribed.    Follow up if:  fever lasting more than an additional 24-48 hours, worsening symptoms, or not improving over the next week.    Follow up in the ER immediately if you develop:  severe/worsening chest pain, breathing concerns, severe/worsening headache, uncontrolled vomiting, signs/symptoms of dehydration, or other emergent symptoms.

## 2021-12-21 ENCOUNTER — OFFICE VISIT - RIVER FALLS (OUTPATIENT)
Dept: FAMILY MEDICINE | Facility: CLINIC | Age: 21
End: 2021-12-21

## 2021-12-21 ASSESSMENT — MIFFLIN-ST. JEOR: SCORE: 1406.81

## 2022-01-08 ENCOUNTER — HEALTH MAINTENANCE LETTER (OUTPATIENT)
Age: 22
End: 2022-01-08

## 2022-02-07 ENCOUNTER — OFFICE VISIT - RIVER FALLS (OUTPATIENT)
Dept: FAMILY MEDICINE | Facility: CLINIC | Age: 22
End: 2022-02-07

## 2022-02-10 ENCOUNTER — OFFICE VISIT - RIVER FALLS (OUTPATIENT)
Dept: FAMILY MEDICINE | Facility: CLINIC | Age: 22
End: 2022-02-10

## 2022-02-11 VITALS
HEIGHT: 65 IN | DIASTOLIC BLOOD PRESSURE: 75 MMHG | HEART RATE: 114 BPM | TEMPERATURE: 98.1 F | BODY MASS INDEX: 23.54 KG/M2 | SYSTOLIC BLOOD PRESSURE: 113 MMHG | WEIGHT: 141.3 LBS

## 2022-02-11 VITALS
BODY MASS INDEX: 22.99 KG/M2 | DIASTOLIC BLOOD PRESSURE: 70 MMHG | OXYGEN SATURATION: 99 % | WEIGHT: 138 LBS | DIASTOLIC BLOOD PRESSURE: 78 MMHG | WEIGHT: 138 LBS | HEIGHT: 65 IN | SYSTOLIC BLOOD PRESSURE: 104 MMHG | SYSTOLIC BLOOD PRESSURE: 117 MMHG | TEMPERATURE: 98.2 F | BODY MASS INDEX: 22.99 KG/M2 | HEIGHT: 65 IN | HEART RATE: 85 BPM | HEART RATE: 88 BPM

## 2022-02-16 NOTE — NURSING NOTE
CAGE Assessment Entered On:  10/1/2021 12:04 PM CDT    Performed On:  9/22/2021 12:04 PM CDT by Angela Haney               Assessment   Have you ever felt you should cut down on your drinking :   No   Have people annoyed you by criticizing your drinking :   No   Have you ever felt bad or guilty about your drinking :   No   Have you ever taken a drink first thing in the morning to steady your nerves or get rid of a hangover (Eye-opener) :   No   CAGE Score :   0    Angela Haney - 10/1/2021 12:04 PM CDT

## 2022-02-16 NOTE — NURSING NOTE
Comprehensive Intake Entered On:  9/22/2021 2:22 PM CDT    Performed On:  9/22/2021 2:13 PM CDT by Samantha Painter CMA               Summary   Chief Complaint :   New Patient: Physical, labs   Weight Measured :   138.0 lb(Converted to: 138 lb 0 oz, 62.596 kg)    Height Measured :   65 in(Converted to: 5 ft 5 in, 165.10 cm)    Body Mass Index :   22.96 kg/m2   Body Surface Area :   1.69 m2   Systolic Blood Pressure :   104 mmHg   Diastolic Blood Pressure :   70 mmHg   Mean Arterial Pressure :   81 mmHg   Peripheral Pulse Rate :   85 bpm   BP Site :   Right arm   BP Method :   Manual   HR Method :   Electronic   Oxygen Saturation :   99 %   Samantha Painter CMA - 9/22/2021 2:13 PM CDT   Health Status   Allergies Verified? :   Yes   Medication History Verified? :   Yes   Medical History Verified? :   Yes   Tobacco Use? :   Never smoker   Samantha Painter CMA - 9/22/2021 2:13 PM CDT   Consents   Consent for Immunization Exchange :   Consent Granted   Consent for Immunizations to Providers :   Consent Granted   Samantha Painter CMA - 9/22/2021 2:13 PM CDT   Meds / Allergies   (As Of: 9/22/2021 2:22:30 PM CDT)   Allergies (Active)   NSAIDs  Estimated Onset Date:   Unspecified ; Created By:   Samantha Painter CMA; Reaction Status:   Active ; Category:   Drug ; Substance:   NSAIDs ; Type:   Allergy ; Updated By:   Samantha Painter CMA; Reviewed Date:   9/22/2021 2:16 PM CDT        Medication List   (As Of: 9/22/2021 2:22:30 PM CDT)   Home Meds    Miscellaneous Rx Supply  :   Miscellaneous Rx Supply ; Status:   Documented ; Ordered As Mnemonic:   fiber gummy  5mg ; Simple Display Line:   0 Refill(s) ; Catalog Code:   Miscellaneous Rx Supply ; Order Dt/Tm:   9/22/2021 2:18:01 PM CDT          multivitamin  :   multivitamin ; Status:   Documented ; Ordered As Mnemonic:   Multi Vitamin+ ; Simple Display Line:   0 Refill(s) ; Catalog Code:   multivitamin ; Order Dt/Tm:   9/22/2021 2:17:08 PM CDT          FLUoxetine  :   FLUoxetine ; Status:    Documented ; Ordered As Mnemonic:   FLUoxetine 60 mg oral tablet ; Simple Display Line:   60 mg, 1 tab(s), Oral, qam, 0 Refill(s) ; Catalog Code:   FLUoxetine ; Order Dt/Tm:   9/22/2021 2:16:59 PM CDT          ascorbic acid  :   ascorbic acid ; Status:   Documented ; Ordered As Mnemonic:   Vitamin C 500 mg oral tablet ; Simple Display Line:   500 mg, 1 tab(s), Oral, daily, 0 Refill(s) ; Catalog Code:   ascorbic acid ; Order Dt/Tm:   9/22/2021 2:17:16 PM CDT          cholecalciferol  :   cholecalciferol ; Status:   Documented ; Ordered As Mnemonic:   Vitamin D3 1000 intl units oral tablet ; Simple Display Line:   25 mcg, 1 tab(s), Oral, daily, 0 Refill(s) ; Catalog Code:   cholecalciferol ; Order Dt/Tm:   9/22/2021 2:17:32 PM CDT          cyanocobalamin  :   cyanocobalamin ; Status:   Documented ; Ordered As Mnemonic:   Vitamin B12 100 mcg oral tablet ; Simple Display Line:   100 mcg, 1 tab(s), Oral, daily, 0 Refill(s) ; Catalog Code:   cyanocobalamin ; Order Dt/Tm:   9/22/2021 2:18:32 PM CDT            Procedures / Surgeries        -    Procedure History   (As Of: 9/22/2021 2:22:30 PM CDT)     Social History   Social History   (As Of: 9/22/2021 2:22:30 PM CDT)   Tobacco:        Never (less than 100 in lifetime)   (Last Updated: 9/22/2021 2:20:44 PM CDT by Samantha Painter CMA)          Electronic Cigarette/Vaping:        Electronic Cigarette Use: Never.   (Last Updated: 9/22/2021 2:20:48 PM CDT by Samantha Painter CMA)

## 2022-02-16 NOTE — PROGRESS NOTES
Patient:   RUDY XIE            MRN: [id number]            FIN: [id number]               Age:   21 years     Sex:  Female     :  2000   Associated Diagnoses:   Nausea in adult; Abdominal pain   Author:   Abdi Covarrubias PA-C      Report Summary   Diagnosis  Nausea in adult (GTU73-BP R11.0).  SummaryOrders   Visit Information   Visit type:  General concerns.    Accompanied by:  No one.    Source of history:  Self.    Referral source:  Self.    History limitation:  None.       Chief Complaint   10/1/2021 7:24 AM CDT    c/o continued nausea and now stomach pains since yesterday      History of Present Illness             The patient presents with abdominal pain.  The abdominal pain is generalized.  The abdominal pain is described as aching and cramping.  The severity of the abdominal pain is moderate.  The abdominal pain is episodic.  The abdominal pain has lasted for 3 week(s).  Intermittent abdominal pain. Came back last night. Nausea persists and has been present x three weeks. Two negative pregnancy tests. No emesis. Not related to ingestions. No ETOH or tobacco. Rare caffeine. No past abd/pelvic surgeries. No fever or chills. No dysuria. Had normal CBC, chem 14, TSH, and UA earlier this week. Infrequent BM, but that is her baseline. Dominga symptoms.  Associated symptoms consist of nausea.        Review of Systems   Constitutional:  Negative.    Respiratory:  Negative.    Cardiovascular:  Negative.    Gastrointestinal:  Negative except as documented in history of present illness.    Genitourinary:  Negative except as documented in history of present illness.    Gynecologic:  Negative.       Health Status   Allergies:    Allergic Reactions (Selected)  Severity Not Documented  NSAIDs (No reactions were documented)   Medications:  (Selected)   Documented Medications  Documented  FLUoxetine 60 mg oral tablet: = 1 tab(s) ( 60 mg ), Oral, qam, 0 Refill(s), Type: Maintenance  Multi Vitamin+: 0 Refill(s), Type:  Maintenance  Vitamin B12 100 mcg oral tablet: = 1 tab(s) ( 100 mcg ), Oral, daily, 0 Refill(s), Type: Maintenance  Vitamin C 500 mg oral tablet: = 1 tab(s) ( 500 mg ), Oral, daily, 0 Refill(s), Type: Maintenance  Vitamin D3 1000 intl units oral tablet: = 1 tab(s) ( 25 mcg ), Oral, daily, 0 Refill(s), Type: Maintenance  fiber gummy  5mg: fiber gummy  5mg, 0 Refill(s), Type: Maintenance      Histories   Past Medical History:    No active or resolved past medical history items have been selected or recorded.   Family History:    No family history items have been selected or recorded.   Procedure history:    Tonsillectomy ([id number]) on 7/19/2017 at 17 Years.  Lateral release of knee ([id number]) on 9/17/2015 at 15 Years.  Lateral release of knee ([id number]) on 11/15/2013 at 13 Years.   Social History:        Electronic Cigarette/Vaping Assessment            Electronic Cigarette Use: Never.      Tobacco Assessment            Never (less than 100 in lifetime)        Physical Examination   Vital Signs   10/1/2021 7:24 AM CDT Temperature Tympanic 98.2 DegF    Peripheral Pulse Rate 88 bpm    HR Method Electronic    Systolic Blood Pressure 117 mmHg    Diastolic Blood Pressure 78 mmHg    Mean Arterial Pressure 91 mmHg    BP Site Right arm    BP Method Electronic      Measurements from flowsheet : Measurements   10/1/2021 7:24 AM CDT Height Measured - Standard 65 in    Weight Measured - Standard 138.0 lb    BSA 1.69 m2    Body Mass Index 22.96 kg/m2      General:  Alert and oriented, No acute distress.    Respiratory:  Lungs are clear to auscultation, Respirations are non-labored.    Cardiovascular:  Normal rate, Regular rhythm.    Gastrointestinal:  Soft, Non-distended, Normal bowel sounds, No organomegaly, Mild left sided abdominal pain. No guarding or rebound..       Review / Management   Results review:  Repeat CBC and UA are normal..    Pelvic US normal. IUD in proper position      Impression and Plan   Diagnosis      Nausea in adult (XOL20-SJ R11.0).     Abdominal pain (HTV10-NA R10.9).     Summary:  FU end of next week. I want her to see one of the other providers for a fresh set of eyes. Told her we need to re sample her PAP due to lack of transformation zone cells. To ED if acutely worse..    Orders     Orders (Selected)   Prescriptions  Prescribed  Pepcid 40 mg oral tablet: = 1 tab(s) ( 40 mg ), Oral, hs, # 30 tab(s), 0 Refill(s), Type: Maintenance, Pharmacy: [facility] #[id number], 1 tab(s) Oral hs, 65, in, 10/01/21 7:24:00 CDT, Height Measured, 138, lb, 10/01/21 7:24:00 CDT, Weight Measured  Protonix 40 mg oral delayed release tablet: = 1 tab(s) ( 40 mg ), Oral, daily, # 30 tab(s), 0 Refill(s), Type: Maintenance, Pharmacy: [facility] #[id number], 1 tab(s) Oral daily, 65, in, 10/01/21 7:24:00 CDT, Height Measured, 138, lb, 10/01/21 7:24:00 CDT, Weight Measured  Zofran ODT 8 mg oral tablet, disintegrating: = 1 tab(s) ( 8 mg ), Oral, tid, # 9 tab(s), 0 Refill(s), Type: Acute, Pharmacy: [facility] #[id number], 1 tab(s) Oral tid, 65, in, 10/01/21 7:24:00 CDT, Height Measured, 138, lb, 10/01/21 7:24:00 CDT, Weight Measured.

## 2022-02-16 NOTE — LETTER
(Inserted Image. Unable to display)           [address] [place] [address]  [phone]    September 28, 2021      RUDY XIE      [address]  [place] [address]        Dear RUDY,    Thank you for selecting [facility] for your healthcare needs. Below you will find the result of your recent test(s) done at our clinic.     Please call me to discuss your Pap smear results.      Result Name Current Result   ThinPrep PAP with HPV   9/22/2021       Please contact my practice at [phone] if you have any questions or concerns.      Sincerely,        Aj Bo MD ,   Magaly Walton MD,   Abdi Covarrubias PA-C                  What do you labs mean?  Below is a glossary of commonly ordered labs:  LDL - Bad Cholesterol HDL - Good Cholesterol  AST/ALT - Liver Function Cr/creatinine - Kidney Function  Microalbumin - Kidney Function  TSH - Thyroid Function PSA- Prostate  Hgb - iron stores/blood count Hgb A1c - Diabetic control

## 2022-02-16 NOTE — NURSING NOTE
Comprehensive Intake Entered On:  10/1/2021 7:27 AM CDT    Performed On:  10/1/2021 7:24 AM CDT by Samantha Painter CMA               Summary   Chief Complaint :   c/o continued nausea and now stomach pains since yesterday   Weight Measured :   138.0 lb(Converted to: 138 lb 0 oz, 62.596 kg)    Height Measured :   65 in(Converted to: 5 ft 5 in, 165.10 cm)    Body Mass Index :   22.96 kg/m2   Body Surface Area :   1.69 m2   Systolic Blood Pressure :   117 mmHg   Diastolic Blood Pressure :   78 mmHg   Mean Arterial Pressure :   91 mmHg   Peripheral Pulse Rate :   88 bpm   BP Site :   Right arm   BP Method :   Electronic   HR Method :   Electronic   Temperature Tympanic :   98.2 DegF(Converted to: 36.8 DegC)    Samantha Painter CMA - 10/1/2021 7:24 AM CDT   Health Status   Allergies Verified? :   Yes   Medication History Verified? :   Yes   Medical History Verified? :   Yes   Tobacco Use? :   Never smoker   Samantha Painter CMA - 10/1/2021 7:24 AM CDT   Consents   Consent for Immunization Exchange :   Consent Granted   Consent for Immunizations to Providers :   Consent Granted   Samantha Painter CMA - 10/1/2021 7:24 AM CDT   Meds / Allergies   (As Of: 10/1/2021 7:27:40 AM CDT)   Allergies (Active)   NSAIDs  Estimated Onset Date:   Unspecified ; Created By:   Samantha Painter CMA; Reaction Status:   Active ; Category:   Drug ; Substance:   NSAIDs ; Type:   Allergy ; Updated By:   Samantha Painter CMA; Reviewed Date:   10/1/2021 7:24 AM CDT        Medication List   (As Of: 10/1/2021 7:27:40 AM CDT)   Home Meds    Miscellaneous Rx Supply  :   Miscellaneous Rx Supply ; Status:   Documented ; Ordered As Mnemonic:   fiber gummy  5mg ; Simple Display Line:   0 Refill(s) ; Catalog Code:   Miscellaneous Rx Supply ; Order Dt/Tm:   9/22/2021 2:18:01 PM CDT          multivitamin  :   multivitamin ; Status:   Documented ; Ordered As Mnemonic:   Multi Vitamin+ ; Simple Display Line:   0 Refill(s) ; Catalog Code:   multivitamin ; Order Dt/Tm:    9/22/2021 2:17:08 PM CDT          FLUoxetine  :   FLUoxetine ; Status:   Documented ; Ordered As Mnemonic:   FLUoxetine 60 mg oral tablet ; Simple Display Line:   60 mg, 1 tab(s), Oral, qam, 0 Refill(s) ; Catalog Code:   FLUoxetine ; Order Dt/Tm:   9/22/2021 2:16:59 PM CDT          ascorbic acid  :   ascorbic acid ; Status:   Documented ; Ordered As Mnemonic:   Vitamin C 500 mg oral tablet ; Simple Display Line:   500 mg, 1 tab(s), Oral, daily, 0 Refill(s) ; Catalog Code:   ascorbic acid ; Order Dt/Tm:   9/22/2021 2:17:16 PM CDT          cholecalciferol  :   cholecalciferol ; Status:   Documented ; Ordered As Mnemonic:   Vitamin D3 1000 intl units oral tablet ; Simple Display Line:   25 mcg, 1 tab(s), Oral, daily, 0 Refill(s) ; Catalog Code:   cholecalciferol ; Order Dt/Tm:   9/22/2021 2:17:32 PM CDT          cyanocobalamin  :   cyanocobalamin ; Status:   Documented ; Ordered As Mnemonic:   Vitamin B12 100 mcg oral tablet ; Simple Display Line:   100 mcg, 1 tab(s), Oral, daily, 0 Refill(s) ; Catalog Code:   cyanocobalamin ; Order Dt/Tm:   9/22/2021 2:18:32 PM CDT

## 2022-02-16 NOTE — LETTER
(Inserted Image. Unable to display)               [address] [place] [address]  [phone]      October 08, 2021      RUDY XIE  [address]  [place] [address]      Dear RUDY,       Thank you for selecting [facility] for your healthcare needs. Below you will find the result of your recent test(s) done at our clinic.     Karen,    Just to follow up from our recent visits. I hope you are feeling better. I have checked the most recent guidelines and we do not have to repeat your Pap smear. Let us know if you need anything further.          Please contact my practice at [phone] if you have any questions or concerns.     Sincerely,        Satish CLARK, Abdi

## 2022-02-16 NOTE — TELEPHONE ENCOUNTER
---------------------  From: Abdi Covarrubias PA-C   To: [facility] Message Pool ([id number]_[facility]);     Sent: 10/6/2021 10:21:16 AM CDT  Subject: General Message     She cancelled off of [facility] schedule for today for FU abdominal pain and nausea. Could you call and see if she is better, or if she is rescheduling. [facility] stated new guidelines state we don't need to repeat her PAP at this time.    KAHTime: 8:54am  Note: LMTCB---------------------  From: Samantha Painter CMA ([facility] Message Pool ([id number]))   To: Phone Messages Pool ([id number]_[place] [facility] [place]);     Sent: 10/7/2021 8:55:32 AM CDT  Subject: FW: General Message

## 2022-02-16 NOTE — NURSING NOTE
Comprehensive Intake Entered On:  12/21/2021 2:24 PM CST    Performed On:  12/21/2021 2:20 PM CST by Samantha Painter CMA               Summary   Chief Complaint :   c/o urge to urinate, starting to hurt with urination and light pink tinge to toliet paper since yesterday   Weight Measured :   141.3 lb(Converted to: 141 lb 5 oz, 64.093 kg)    Height Measured :   65 in(Converted to: 5 ft 5 in, 165.10 cm)    Body Mass Index :   23.51 kg/m2   Body Surface Area :   1.71 m2   Systolic Blood Pressure :   113 mmHg   Diastolic Blood Pressure :   75 mmHg   Mean Arterial Pressure :   88 mmHg   Peripheral Pulse Rate :   114 bpm (HI)    BP Site :   Right arm   BP Method :   Electronic   HR Method :   Electronic   Temperature Tympanic :   98.1 DegF(Converted to: 36.7 DegC)    Samantha Painter CMA - 12/21/2021 2:20 PM CST   Health Status   Allergies Verified? :   Yes   Medication History Verified? :   Yes   Medical History Verified? :   Yes   Pre-Visit Planning Status :   Completed   Tobacco Use? :   Never smoker   Samantha Painter CMA - 12/21/2021 2:20 PM CST   Consents   Consent for Immunization Exchange :   Consent Granted   Consent for Immunizations to Providers :   Consent Granted   Samantha Painter CMA - 12/21/2021 2:20 PM CST   Meds / Allergies   (As Of: 12/21/2021 2:24:01 PM CST)   Allergies (Active)   NSAIDs  Estimated Onset Date:   Unspecified ; Created By:   Samantha Painter CMA; Reaction Status:   Active ; Category:   Drug ; Substance:   NSAIDs ; Type:   Allergy ; Updated By:   Samantha Painter CMA; Reviewed Date:   12/21/2021 2:20 PM CST        Medication List   (As Of: 12/21/2021 2:24:01 PM CST)   Prescription/Discharge Order    famotidine  :   famotidine ; Status:   Prescribed ; Ordered As Mnemonic:   Pepcid 40 mg oral tablet ; Simple Display Line:   40 mg, 1 tab(s), Oral, hs, 30 tab(s), 0 Refill(s) ; Ordering Provider:   Abdi Covarrubias PA-C; Catalog Code:   famotidine ; Order Dt/Tm:   10/1/2021 9:48:36 AM CDT          pantoprazole   :   pantoprazole ; Status:   Prescribed ; Ordered As Mnemonic:   Protonix 40 mg oral delayed release tablet ; Simple Display Line:   40 mg, 1 tab(s), Oral, daily, 30 tab(s), 0 Refill(s) ; Ordering Provider:   Abdi Covarrubias PA-C; Catalog Code:   pantoprazole ; Order Dt/Tm:   10/1/2021 9:48:26 AM CDT            Home Meds    Miscellaneous Rx Supply  :   Miscellaneous Rx Supply ; Status:   Documented ; Ordered As Mnemonic:   fiber gummy  5mg ; Simple Display Line:   0 Refill(s) ; Catalog Code:   Miscellaneous Rx Supply ; Order Dt/Tm:   9/22/2021 2:18:01 PM CDT          multivitamin  :   multivitamin ; Status:   Documented ; Ordered As Mnemonic:   Multi Vitamin+ ; Simple Display Line:   0 Refill(s) ; Catalog Code:   multivitamin ; Order Dt/Tm:   9/22/2021 2:17:08 PM CDT          FLUoxetine  :   FLUoxetine ; Status:   Documented ; Ordered As Mnemonic:   FLUoxetine 60 mg oral tablet ; Simple Display Line:   60 mg, 1 tab(s), Oral, qam, 0 Refill(s) ; Catalog Code:   FLUoxetine ; Order Dt/Tm:   9/22/2021 2:16:59 PM CDT          ascorbic acid  :   ascorbic acid ; Status:   Documented ; Ordered As Mnemonic:   Vitamin C 500 mg oral tablet ; Simple Display Line:   500 mg, 1 tab(s), Oral, daily, 0 Refill(s) ; Catalog Code:   ascorbic acid ; Order Dt/Tm:   9/22/2021 2:17:16 PM CDT          cholecalciferol  :   cholecalciferol ; Status:   Documented ; Ordered As Mnemonic:   Vitamin D3 1000 intl units oral tablet ; Simple Display Line:   25 mcg, 1 tab(s), Oral, daily, 0 Refill(s) ; Catalog Code:   cholecalciferol ; Order Dt/Tm:   9/22/2021 2:17:32 PM CDT          cyanocobalamin  :   cyanocobalamin ; Status:   Documented ; Ordered As Mnemonic:   Vitamin B12 100 mcg oral tablet ; Simple Display Line:   100 mcg, 1 tab(s), Oral, daily, 0 Refill(s) ; Catalog Code:   cyanocobalamin ; Order Dt/Tm:   9/22/2021 2:18:32 PM CDT

## 2022-02-16 NOTE — LETTER
(Inserted Image. Unable to display)           [address] [place] [address]  [phone]    September 23, 2021      RUDY XIE      [address]  [place] [address]        Dear RUDY,    Thank you for selecting [facility] for your healthcare needs. Below you will find the result of your recent test(s) done at our clinic.     All of your labs are normal. Let us know if your nausea and other symptoms continue.       Result Name Current Result Reference Range   Glucose Level (mg/dL)  77 9/22/2021 65 - 99   BUN (mg/dL)  17 9/22/2021 7 - 25   Creatinine Level (mg/dL)  1.06 9/22/2021 0.50 - 1.10   eGFR (mL/min/1.73m2)  75 9/22/2021 > OR = 60 -    Sodium Level (mmol/L)  140 9/22/2021 135 - 146   Potassium Level (mmol/L)  4.4 9/22/2021 3.5 - 5.3   Chloride Level (mmol/L)  104 9/22/2021 98 - 110   CO2 Level (mmol/L)  26 9/22/2021 20 - 32   Calcium Level (mg/dL)  9.8 9/22/2021 8.6 - 10.2   Protein Total (gm/dL)  6.8 9/22/2021 6.1 - 8.1   Albumin Level (gm/dL)  4.4 9/22/2021 3.6 - 5.1   Globulin  2.4 9/22/2021 1.9 - 3.7   A/G Ratio  1.8 9/22/2021 1.0 - 2.5   Bilirubin Total (mg/dL)  0.7 9/22/2021 0.2 - 1.2   Alkaline Phosphatase (unit/L)  64 9/22/2021 31 - 125   AST/SGOT (unit/L)  16 9/22/2021 10 - 30   ALT/SGPT (unit/L)  13 9/22/2021 6 - 29   UA Color  YELLOW 9/22/2021 YELLOW -    UA Appear  CLEAR 9/22/2021 CLEAR -    UA Specific [place]  1.013 9/22/2021 1.001 - 1.035   UA pH  6.0 9/22/2021 5.0 - 8.0   UA Glucose  NEGATIVE 9/22/2021 NEGATIVE - NEGATIVE   UA Bilirubin  NEGATIVE 9/22/2021 NEGATIVE - NEGATIVE   UA Ketones  NEGATIVE 9/22/2021 NEGATIVE - NEGATIVE   UA Blood  NEGATIVE 9/22/2021 NEGATIVE - NEGATIVE   UA Protein  NEGATIVE 9/22/2021 NEGATIVE - NEGATIVE   UA Nitrite  NEGATIVE 9/22/2021 NEGATIVE - NEGATIVE   UA Leukocyte Esterase  NEGATIVE 9/22/2021 NEGATIVE - NEGATIVE   UA WBC (/HPF)  NONE SEEN 9/22/2021  - < OR = 5   UA RBC (/HPF)  NONE SEEN  9/22/2021  - < OR = 2   UA Squamous Epithelial Cells (/HPF)  NONE SEEN 9/22/2021  - < OR = 5   UA Bacteria (/HPF)  NONE SEEN 9/22/2021 NONE SEEN -    UA Hyaline Cast (/LPF)  NONE SEEN 9/22/2021 NONE SEEN -    WBC  6.4 9/22/2021 3.8 - 10.8   RBC  4.36 9/22/2021 3.80 - 5.10   Hgb (gm/dL)  14.3 9/22/2021 11.7 - 15.5   Hct (%)  40.9 9/22/2021 35.0 - 45.0   MCV (fL)  93.8 9/22/2021 80.0 - 100.0   MCH (pg)  32.8 9/22/2021 27.0 - 33.0   MCHC (gm/dL)  35.0 9/22/2021 32.0 - 36.0   RDW (%)  11.6 9/22/2021 11.0 - 15.0   Platelet  229 9/22/2021 140 - 400   MPV (fL)  11.5 9/22/2021 7.5 - 12.5   Abs Neutrophils  4,179 9/22/2021 1,500 - 7,800   Abs Lymphocytes  1,568 9/22/2021 850 - 3,900   Abs Monocytes  486 9/22/2021 200 - 950   Abs Eosinophils  128 9/22/2021 15 - 500   Abs Basophils  38 9/22/2021 0 - 200   Neutrophils (%)  65.3 9/22/2021    Lymphocytes (%)  24.5 9/22/2021    Monocytes (%)  7.6 9/22/2021    Eosinophils (%)  2.0 9/22/2021    Basophils (%)  0.6 9/22/2021    TSH (mIU/L)  0.47 9/22/2021        Please contact my practice at [phone] if you have any questions or concerns.      Sincerely,        Aj Bo MD ,   Magaly Walton MD,   Abdi Covarrubias PA-C                  What do you labs mean?  Below is a glossary of commonly ordered labs:  LDL - Bad Cholesterol HDL - Good Cholesterol  AST/ALT - Liver Function Cr/creatinine - Kidney Function  Microalbumin - Kidney Function  TSH - Thyroid Function PSA- Prostate  Hgb - iron stores/blood count Hgb A1c - Diabetic control

## 2022-02-16 NOTE — PROGRESS NOTES
Patient:   RUDY XIE            MRN: [id number]            FIN: [id number]               Age:   21 years     Sex:  Female     :  2000   Associated Diagnoses:   Renal disorder; Annual physical exam; Nausea; Polydipsia   Author:   Abdi Covarrubias PA-C      Visit Information      Date of Service: 2021 01:59 pm  Performing Location: [facility]  Encounter#: [id number]      Primary Care Provider (PCP):  NONE ,       Referring Provider:  Abdi Covarrubias PA-C    NPI# [id number]   Visit type:  Annual exam, General concerns.    Accompanied by:  No one.    Source of history:  Self, Medical record.    Referral source:  Self.    History limitation:  None.       Chief Complaint   2021 2:13 PM CDT    New Patient: Physical, labs      Well Adult History   Well Adult History             The patient presents for well adult exam.  The patient's general health status is described as good.  The patient's diet is described as balanced.  Exercise: occasional.  Associated symptoms consist of weight gain.  Last menstrual period: IUD.  Additional pertinent history: occasional caffeine use, tobacco use none and alcohol use socially.  Random nausea past 3 months. Not related to intake. PPI helps some. Some anxiousness.Trial of Dramamine with some improvement.  Mild weight gain with Covid. Student at [employer]. 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 Needs labs. Some polydipsia. Heat and cold intolerance. .        Review of Systems   Constitutional:  Negative.    Eye   Ear/Nose/Mouth/Throat:  Negative.    Respiratory:  Negative.    Cardiovascular:  Negative.    Breast:  Negative.    Gastrointestinal:  Nausea, Heartburn.    Genitourinary:  Negative.    Gynecologic:  Negative.    Hematology/Lymphatics:  Negative.    Endocrine:  Negative except as documented in history of present illness.    Immunologic:  Negative.    Musculoskeletal:  Joint pain.    Integumentary:  Negative.    Neurologic:  Negative.     Psychiatric:  Negative.       Health Status   Allergies:    Allergic Reactions (Selected)  Severity Not Documented  NSAIDs (No reactions were documented)   Medications:  (Selected)   Documented Medications  Documented  FLUoxetine 60 mg oral tablet: = 1 tab(s) ( 60 mg ), Oral, qam, 0 Refill(s), Type: Maintenance  Multi Vitamin+: 0 Refill(s), Type: Maintenance  Vitamin B12 100 mcg oral tablet: = 1 tab(s) ( 100 mcg ), Oral, daily, 0 Refill(s), Type: Maintenance  Vitamin C 500 mg oral tablet: = 1 tab(s) ( 500 mg ), Oral, daily, 0 Refill(s), Type: Maintenance  Vitamin D3 1000 intl units oral tablet: = 1 tab(s) ( 25 mcg ), Oral, daily, 0 Refill(s), Type: Maintenance  fiber gummy  5mg: fiber gummy  5mg, 0 Refill(s), Type: Maintenance,    Medications          *denotes recorded medication          *FLUoxetine 60 mg oral tablet: 60 mg, 1 tab(s), Oral, qam, 0 Refill(s).          *fiber gummy  5m Refill(s).          *Vitamin C 500 mg oral tablet: 500 mg, 1 tab(s), Oral, daily, 0 Refill(s).          *Vitamin D3 1000 intl units oral tablet: 25 mcg, 1 tab(s), Oral, daily, 0 Refill(s).          *Vitamin B12 100 mcg oral tablet: 100 mcg, 1 tab(s), Oral, daily, 0 Refill(s).          *Multi Vitamin+: 0 Refill(s).          Histories   Past Medical History:    No active or resolved past medical history items have been selected or recorded.   Family History:    No family history items have been selected or recorded.   Procedure history:    No active procedure history items have been selected or recorded.   Social History:        Electronic Cigarette/Vaping Assessment            Electronic Cigarette Use: Never.      Tobacco Assessment            Never (less than 100 in lifetime)        Physical Examination   Vital Signs   2021 2:13 PM CDT Peripheral Pulse Rate 85 bpm    HR Method Electronic    Systolic Blood Pressure 104 mmHg    Diastolic Blood Pressure 70 mmHg    Mean Arterial Pressure 81 mmHg    BP Site Right arm    BP  Method Manual    Oxygen Saturation 99 %      Measurements from flowsheet : Measurements   9/22/2021 2:13 PM CDT Height Measured - Standard 65 in    Weight Measured - Standard 138.0 lb    BSA 1.69 m2    Body Mass Index 22.96 kg/m2      General:  Alert and oriented, No acute distress.    Eye:  Pupils are equal, round and reactive to light, Extraocular movements are intact, Normal conjunctiva.    HENT:  Normocephalic, Tympanic membranes are clear, Oral mucosa is moist, No pharyngeal erythema.    Neck:  Supple, Non-tender, No lymphadenopathy.    Respiratory:  Lungs are clear to auscultation, Respirations are non-labored, Breath sounds are equal.    Cardiovascular:  Normal rate, Regular rhythm, No murmur.    Breast:  No mass, No tenderness, No discharge.    Gastrointestinal:  Soft, Non-tender, Normal bowel sounds.    Genitourinary:  No costovertebral angle tenderness.         Groin/ inguinal region: Bilateral, Within normal limits.         Labia: Bilateral, Within normal limits.         Mons pubis: WNL.         Adnexa: Bilateral, Within normal limits.    Integumentary:  No rash.    Neurologic:  No focal deficits.    Psychiatric:  Cooperative, Appropriate mood & affect, Normal judgment, Non-suicidal.       Impression and Plan   Diagnosis     Renal disorder (WXM63-HQ N28.9).     Annual physical exam (VAT85-TF Z00.00).     Nausea (QAP07-FE R11.0).     Polydipsia (YTV84-VS R63.1).     Patient Instructions:       Counseled: Patient, Regarding diagnosis, Regarding medications, Verbalized understanding.    Summary:  FU after labs. FU if nausea persists..    Orders     Orders (Selected)   Outpatient Orders  Ordered (In Transit)  CBC (includes diff/plt)* ([facility]): Specimen Type: Blood, Collection Date: 09/22/21 14:40:00 CDT  Comprehensive Metabolic Panel* ([facility]): Specimen Type: Serum, Collection Date: 09/22/21 14:40:00 CDT  TSH* ([facility]): Specimen Type: Serum, Collection Date: 09/22/21 14:40:00 CDT  Urinalysis, Complete* ([facility]):  Specimen Type: Urine, Collection Date: 09/22/21 14:40:00 CDT.

## 2022-02-16 NOTE — PROCEDURES
Accession Number:       [id number]  CLINICAL INFORMATION::     None given  LMP::     2019  PREV. PAP::     NONE GIVEN  PREV. BX::     NONE GIVEN  SOURCE::     Cervix, Endocervix  STATEMENT OF ADEQUACY::     Satisfactory for evaluation. Endocervical/transformation zone component absent.  INTERPRETATION/RESULT::     Negative for intraepithelial lesion or malignancy.  COMMENT::     This Pap test has been evaluated with computer assisted technology.  CYTOTECHNOLOGIST::     BRANDON, CT(ASCP) CT Screening location: [place] [address]  REVIEW CYTOTECHNOLOGIST::     MICHAEL CT(ASCP) CT Screening location: [place] [address]  COMMENT:     See comment       EXPLANATORY NOTE:         The Pap is a screening test for cervical cancer. It is       not a diagnostic test and is subject to false negative       and false positive results. It is most reliable when a       satisfactory sample, regularly obtained, is submitted       with relevant clinical findings and history, and when       the Pap result is evaluated along with historic and       current clinical information.

## 2022-02-16 NOTE — NURSING NOTE
Urine Dipstick POC Entered On:  10/21/2021 1:24 PM CDT    Performed On:  10/1/2021 1:24 PM CDT by Georgiana Littlejohn               Urine Dipstick POC   Urine Color Urine Dipstick :   Yellow   Urine Appearance Urine Dipstick :   Clear   Glucose Urine Dipstick :   Negative   Specific Gravity Urine Dipstick :   1.015   Blood Urine Dipstick :   Negative   pH Urine Dipstick :   5.5   Protein Urine Dipstick :   Negative   Bilirubin Urine Dipstick :   Negative   Urobilinogen Urine Dipstick :   0.2 mg/dl   Nitrite Urine Dipstick :   Negative   Leukocytes Urine Dipstick :   Negative   Ketones Urine Dipstick :   Negative   POC Test Comments :   Performed at [facility]   Georgiana Littlejohn  10/21/2021 1:24 PM CDT

## 2022-02-16 NOTE — NURSING NOTE
Depression Screening Entered On:  10/1/2021 12:04 PM CDT    Performed On:  9/22/2021 12:04 PM CDT by Angela Haney               Depression Screening   Little Interest - Pleasure in Activities :   Not at all   Feeling Down, Depressed, Hopeless :   Not at all   Initial Depression Screen Score :   0 Score   Poor Appetite or Overeating :   Not at all   Trouble Falling or Staying Asleep :   Several days   Feeling Tired or Little Energy :   Several days   Feeling Bad About Yourself :   Not at all   Trouble Concentrating :   Not at all   Moving or Speaking Slowly :   Several days   Thoughts Better Off Dead or Hurting Self :   Not at all   Difficulty at Work, Home, Getting Along :   Not difficult at all   Detailed Depression Screen Score :   3    Total Depression Screen Score :   3    Angela Haney - 10/1/2021 12:04 PM CDT
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
## 2022-03-02 VITALS
WEIGHT: 140.7 LBS | DIASTOLIC BLOOD PRESSURE: 60 MMHG | HEART RATE: 90 BPM | HEART RATE: 76 BPM | SYSTOLIC BLOOD PRESSURE: 108 MMHG | BODY MASS INDEX: 23.3 KG/M2 | SYSTOLIC BLOOD PRESSURE: 108 MMHG | BODY MASS INDEX: 23.44 KG/M2 | HEIGHT: 65 IN | DIASTOLIC BLOOD PRESSURE: 64 MMHG | TEMPERATURE: 98.4 F | OXYGEN SATURATION: 97 % | WEIGHT: 140 LBS

## 2022-03-02 NOTE — NURSING NOTE
Comprehensive Intake Entered On:  2/7/2022 5:17 PM CST    Performed On:  2/7/2022 5:12 PM CST by Gissel Baugh CMA               Summary   Chief Complaint :   Would like fluoxetine managed by JENNY.    Weight Measured :   140 lb(Converted to: 140 lb 0 oz, 63.503 kg)    Systolic Blood Pressure :   108 mmHg   Diastolic Blood Pressure :   64 mmHg   Mean Arterial Pressure :   79 mmHg   Peripheral Pulse Rate :   76 bpm   BP Site :   Right arm   Pulse Site :   Radial artery   BP Method :   Manual   HR Method :   Manual   Gissel Baugh CMA - 2/7/2022 5:12 PM CST   Health Status   Allergies Verified? :   Yes   Medication History Verified? :   Yes   Pre-Visit Planning Status :   Not completed   Gissel Baugh CMA - 2/7/2022 5:12 PM CST   Meds / Allergies   (As Of: 2/7/2022 5:17:44 PM CST)   Allergies (Active)   NSAIDs  Estimated Onset Date:   Unspecified ; Created By:   Samantha Painter CMA; Reaction Status:   Active ; Category:   Drug ; Substance:   NSAIDs ; Type:   Allergy ; Updated By:   Samantha Painter CMA; Reviewed Date:   12/21/2021 2:20 PM CST        Medication List   (As Of: 2/7/2022 5:17:44 PM CST)   Home Meds    ascorbic acid  :   ascorbic acid ; Status:   Documented ; Ordered As Mnemonic:   Vitamin C 500 mg oral tablet ; Simple Display Line:   500 mg, 1 tab(s), Oral, daily, 0 Refill(s) ; Catalog Code:   ascorbic acid ; Order Dt/Tm:   9/22/2021 2:17:16 PM CDT          cholecalciferol  :   cholecalciferol ; Status:   Documented ; Ordered As Mnemonic:   Vitamin D3 1000 intl units oral tablet ; Simple Display Line:   25 mcg, 1 tab(s), Oral, daily, 0 Refill(s) ; Catalog Code:   cholecalciferol ; Order Dt/Tm:   9/22/2021 2:17:32 PM CDT          FLUoxetine  :   FLUoxetine ; Status:   Documented ; Ordered As Mnemonic:   FLUoxetine 60 mg oral tablet ; Simple Display Line:   60 mg, 1 tab(s), Oral, qam, 0 Refill(s) ; Catalog Code:   FLUoxetine ; Order Dt/Tm:   9/22/2021 2:16:59 PM CDT          Miscellaneous Rx Supply  :    Miscellaneous Rx Supply ; Status:   Documented ; Ordered As Mnemonic:   fiber gummy  5mg ; Simple Display Line:   0 Refill(s) ; Catalog Code:   Miscellaneous Rx Supply ; Order Dt/Tm:   9/22/2021 2:18:01 PM CDT          multivitamin  :   multivitamin ; Status:   Documented ; Ordered As Mnemonic:   Multi Vitamin+ ; Simple Display Line:   0 Refill(s) ; Catalog Code:   multivitamin ; Order Dt/Tm:   9/22/2021 2:17:08 PM CDT          riboflavin  :   riboflavin ; Status:   Documented ; Ordered As Mnemonic:   Vitamin B2 ; Simple Display Line:   100 mg, Oral, daily, 0 Refill(s) ; Catalog Code:   riboflavin ; Order Dt/Tm:   2/7/2022 5:15:14 PM CST            Procedures / Surgeries        -    Procedure History   (As Of: 2/7/2022 5:17:44 PM CST)     Procedure Dt/Tm:   9/17/2015 ; Anesthesia Minutes:   0 ; Procedure Name:   Lateral release of knee ; Procedure Minutes:   0            Procedure Dt/Tm:   7/19/2017 ; Anesthesia Minutes:   0 ; Procedure Name:   Tonsillectomy ; Procedure Minutes:   0            Procedure Dt/Tm:   11/15/2013 ; Anesthesia Minutes:   0 ; Procedure Name:   Lateral release of knee ; Procedure Minutes:   0

## 2022-03-02 NOTE — NURSING NOTE
Generalized Anxiety Disorder Screening Entered On:  2/7/2022 5:43 PM CST    Performed On:  2/7/2022 5:43 PM CST by Gissel Baugh CMA               NEY-7   NEY Nervous, Anxious On Edge :   More than half the days   NEY Control Worrying B :   More than half the days   NEY Worrying Too Much :   More than half the days   NEY Trouble Relaxing :   Nearly every day   NEY Restless :   Several days   NEY Easily Annoyed/Irritable :   Several days   NEY Afraid :   Not at all   NEY Total Screening Score :   11    NEY Difficulty with Work, Home, Others :   Somewhat difficult   Gissel Baugh CMA - 2/7/2022 5:43 PM CST
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
## 2022-03-02 NOTE — NURSING NOTE
Depression Screening Entered On:  2/7/2022 5:43 PM CST    Performed On:  2/7/2022 5:43 PM CST by Gissel Baugh CMA               Depression Screening   Little Interest - Pleasure in Activities :   Several days   Feeling Down, Depressed, Hopeless :   Not at all   Initial Depression Screen Score :   1 Score   Poor Appetite or Overeating :   Not at all   Trouble Falling or Staying Asleep :   Not at all   Feeling Tired or Little Energy :   Several days   Feeling Bad About Yourself :   More than half the days   Trouble Concentrating :   Not at all   Moving or Speaking Slowly :   Not at all   Thoughts Better Off Dead or Hurting Self :   Not at all   Difficulty at Work, Home, Getting Along :   Not difficult at all   Detailed Depression Screen Score :   3    Total Depression Screen Score :   4    Gissel Baugh CMA - 2/7/2022 5:43 PM CST
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
## 2022-03-02 NOTE — NURSING NOTE
CAGE Assessment Entered On:  2/7/2022 5:43 PM CST    Performed On:  2/7/2022 5:43 PM CST by Gissel Baugh CMA               Assessment   Have you ever felt you should cut down on your drinking :   No   Have people annoyed you by criticizing your drinking :   No   Have you ever felt bad or guilty about your drinking :   No   Have you ever taken a drink first thing in the morning to steady your nerves or get rid of a hangover (Eye-opener) :   No   CAGE Score :   0    Gissel Baugh CMA - 2/7/2022 5:43 PM CST

## 2022-03-15 ENCOUNTER — TELEPHONE (OUTPATIENT)
Dept: FAMILY MEDICINE | Facility: CLINIC | Age: 22
End: 2022-03-15

## 2022-03-15 DIAGNOSIS — F41.1 GAD (GENERALIZED ANXIETY DISORDER): Primary | ICD-10-CM

## 2022-03-15 DIAGNOSIS — F33.1 MAJOR DEPRESSIVE DISORDER, RECURRENT EPISODE, MODERATE (H): ICD-10-CM

## 2022-03-15 RX ORDER — FLUOXETINE HYDROCHLORIDE 60 MG/1
60 TABLET, FILM COATED ORAL; ORAL EVERY MORNING
Qty: 90 TABLET | Refills: 3 | Status: SHIPPED | OUTPATIENT
Start: 2022-03-15 | End: 2022-03-16

## 2022-03-15 NOTE — TELEPHONE ENCOUNTER
I ordered 20mg and 40mg which appears to be covered.     Central Prior Authorization Team   Phone: [phone]      PRIOR AUTHORIZATION DENIED    Medication: FLUoxetine HCl 60 MG TABS - EPA DENIED    Denial Date: 3/8/2022    Denial Rational:   Based on the policy and the information we received your request is denied. We did  not receive documentation that you meet the criteria outlined above.  Formulary alternatives are: citalopram, escitalopram, fluoxetine (except fluoxetine tablet  60 mg, fluoxetine tablet [generics for Sarafem]), paroxetine HCl, paroxetine HCl ext-rel  (except NDC^ [id number]), sertraline, Trintellix. (Requirement: 3 in a class with 3 or  more alternatives, 2 in a class with 2 alternatives, or 1 in a class with only 1 alternative.)        Appeal Information: If the provider would like to appeal this denial, please provide a letter of medical necessity. Please also include any therapies that the patient has tried and their outcomes. The patient's insurance company will also require the provider to address why the insurance preferred options are not appropriate in the patient's therapy.  The reason could be that the preferred options will harm the patient; either physically or mentally. They are contraindicated to the patient; or the patient has already been taking the requested medication and changing the therapy would change the outcome of their therapy.    Once it has been completed and placed in the patient's chart, notify the Central PA Team ([facility]) and the appeal can be initiated on behalf of the patient and provider.

## 2022-03-15 NOTE — TELEPHONE ENCOUNTER
Prescription approved per [facility] Refill Protocol.  Last Written Prescription Date:  2/7/22  Last Fill Quantity: 90,  # refills: 3 was sent to [facility]   Last office visit with prescribing provider: 2/7/22 depression mgmt KAH

## 2022-03-15 NOTE — TELEPHONE ENCOUNTER
Reason for Call:  Medication or medication refill: Patient is currently out of medication     Do you use a [facility]?  Name of the pharmacy and phone number for the current request:  [facility]     Name of the medication requested: Fluoxetine 20mg    Other request: none     Can we leave a detailed message on this number? YES    Phone number patient can be reached at: Home number on file [phone] (home)    Best Time: any    Call taken on 3/15/2022 at 3:38 PM by Angela Sandoval

## 2022-03-16 PROBLEM — F41.1 GENERALIZED ANXIETY DISORDER: Status: ACTIVE | Noted: 2022-03-16

## 2022-03-16 PROBLEM — F33.1 MODERATE EPISODE OF RECURRENT MAJOR DEPRESSIVE DISORDER (H): Status: ACTIVE | Noted: 2022-03-16

## 2022-03-16 RX ORDER — FLUOXETINE 40 MG/1
40 CAPSULE ORAL DAILY
Qty: 90 CAPSULE | Refills: 1 | Status: SHIPPED | OUTPATIENT
Start: 2022-03-16 | End: 2022-10-11

## 2022-06-08 NOTE — PROGRESS NOTES
HISTORY OF PRESENT ILLNESS  Patient reports that she would like to have her tonsils removed.  She has a history of strep throat beginning over 10 years ago.  She has been getting repeated episodes of tonsillitis that wasn't strep.  She reports that recently she has been getting repeated swelling and pain over the last several months.  She was recently diagnosed with Mono.  Overall her symptoms have improved but she is starting to have bleeding and pain again.  She sometimes bleed from the tonsils with hard breathing.  She is getting sore throat every 1-2  months and she misses school with the episodes.  No snoring.  She breathes through the nose during the day.      REVIEW OF SYSTEMS  Review of Systems: a 10-system review was performed. Pertinent positives are noted in the HPI and on a separate scanned document in the chart.    PMH, PSH, FH and SH has documented in the EHR.      EXAM    CONSTITUTIONAL  General Appearance:  Normal, well developed, well nourished, no obvious distress  Ability to Communicate:  communicates appropriately.    HEAD AND FACE  Appearance and Symmetry:  Normal, no scalp or facial scarring or suspicious lesions.  Paranasal sinuses tenderness:  Normal, Paranasal sinuses non tender    EARS  Clinical speech reception threshold:  Normal, able to hear normal speech.  Auricle:  Normal, Auricles without scars, lesions, masses.  External auditory canal:  Normal, External auditory canal normal.  Tympanic membrane:  Normal, Tympanic membranes normal without swelling or erythema.  Tympanic membrane mobility:  Normal, Normal tympanic membrane mobility.    NOSE (speculum or scope)  Architecture:  Normal, Grossly normal external nasal architecture with no masses or lesions.  Mucosa:  Normal mucosa, No polyps or masses.  Septum:  Normal, Septum non-obstructing.  Turbinates:  Normal, No turbinate abnormalities    ORAL CAVITY AND OROPHARYNX  Lips:  Normal.  Dental and gingiva:  Normal, No obvious dental or  Patient was called to schedule a 1 year follow up appointment with Dr Eric    gingival disease.  Mucosa:  Normal, Moist mucous membranes.  Tongue:  Normal, Tongue mobile with no mucosal abnormalities  Tonsils:  2+ tonsil hypertrophy  Hard and soft palate:  Normal, Hard and soft palate without cleft or mucosal lesions.  Oral pharynx:  Normal, Posterior pharynx without lesions or remarkable asymmetry.  Saliva:  Normal, Clear saliva.  Masses:  Normal, No palpable masses or pathologically enlarged lymph nodes.    NECK  Masses/lymph nodes:  Normal, No worrisome neck masses or lymph nodes.  Salivary glands:  Normal, Parotid and submandibular glands.  Trachea and larynx position:  Normal, Trachea and larynx midline.  Thyroid:  Normal, No thyroid abnormality.  Tenderness:  Normal, No cervical tenderness.  Suppleness:  Normal, Neck supple    NEUROLOGICAL  Speech pattern:  Normal, Proasaic    RESPIRATORY  Symmetry and Respiratory effort:  Normal, Symmetric chest movement and expansion with no increased intercostal retractions or use of accessory muscles.     IMPRESSION  Recurrent sore throats and tonsillitis.      RECOMMENDATION  I discussed tonsillectomy and the patient wanted to proceed.  She has been researching this for the last 3 years because of the problems.  I discussed the procedure, goals and risks.      Matthew Cool MD

## 2022-10-03 ENCOUNTER — E-VISIT (OUTPATIENT)
Dept: URGENT CARE | Facility: CLINIC | Age: 22
End: 2022-10-03
Payer: COMMERCIAL

## 2022-10-03 DIAGNOSIS — U07.1 INFECTION DUE TO 2019 NOVEL CORONAVIRUS: Primary | ICD-10-CM

## 2022-10-03 PROCEDURE — 99207 PR NO CHARGE LOS: CPT | Performed by: FAMILY MEDICINE

## 2022-10-04 ENCOUNTER — MYC MEDICAL ADVICE (OUTPATIENT)
Dept: FAMILY MEDICINE | Facility: CLINIC | Age: 22
End: 2022-10-04

## 2022-10-04 ENCOUNTER — VIRTUAL VISIT (OUTPATIENT)
Dept: FAMILY MEDICINE | Facility: CLINIC | Age: 22
End: 2022-10-04
Payer: COMMERCIAL

## 2022-10-04 DIAGNOSIS — U07.1 INFECTION DUE TO 2019 NOVEL CORONAVIRUS: Primary | ICD-10-CM

## 2022-10-04 PROCEDURE — 99213 OFFICE O/P EST LOW 20 MIN: CPT | Mod: CS | Performed by: FAMILY MEDICINE

## 2022-10-04 ASSESSMENT — ASTHMA QUESTIONNAIRES
QUESTION_2 LAST FOUR WEEKS HOW OFTEN HAVE YOU HAD SHORTNESS OF BREATH: NOT AT ALL
QUESTION_3 LAST FOUR WEEKS HOW OFTEN DID YOUR ASTHMA SYMPTOMS (WHEEZING, COUGHING, SHORTNESS OF BREATH, CHEST TIGHTNESS OR PAIN) WAKE YOU UP AT NIGHT OR EARLIER THAN USUAL IN THE MORNING: NOT AT ALL
QUESTION_1 LAST FOUR WEEKS HOW MUCH OF THE TIME DID YOUR ASTHMA KEEP YOU FROM GETTING AS MUCH DONE AT WORK, SCHOOL OR AT HOME: NONE OF THE TIME
QUESTION_4 LAST FOUR WEEKS HOW OFTEN HAVE YOU USED YOUR RESCUE INHALER OR NEBULIZER MEDICATION (SUCH AS ALBUTEROL): NOT AT ALL
ACT_TOTALSCORE: 25
ACT_TOTALSCORE: 25
QUESTION_5 LAST FOUR WEEKS HOW WOULD YOU RATE YOUR ASTHMA CONTROL: COMPLETELY CONTROLLED

## 2022-10-04 ASSESSMENT — PATIENT HEALTH QUESTIONNAIRE - PHQ9: SUM OF ALL RESPONSES TO PHQ QUESTIONS 1-9: 6

## 2022-10-04 NOTE — PROGRESS NOTES
Shelbi is a 22 year old who is being evaluated via a billable telephone visit.      What phone number would you like to be contacted at? [phone]  How would you like to obtain your AVS? MyChart    Assessment & Plan     Infection due to 2019 novel coronavirus  Meets criteria based on current issues of depression and CKD (stage 2)   - nirmatrelvir and ritonavir (PAXLOVID) therapy pack  Dispense: 30 tablet; Refill: 0    Reviewed   - isolation and when to stop   - possibiity of prolonged symptoms   - return to normal activities slowly   - rebound Covid and dysgeusia with paxlovid   - no concerns with paxlovid and Mirena IUD according to: [place] COVID-19 Interactions ([email or web address]-[email or web address].org)    Return if symptoms worsen or fail to improve.    Brianna Luis MD  [facility]   Shelbi is a 22 year old, presenting for the following health issues:  Covid (Symptoms started 10/1 - Tested positive 10/3)      HPI       COVID-19 Symptom Review  How many days ago did these symptoms start? 10/1/22  Sore throat, not sure if having a fever but had chills, cough, difficult to swallow - hurts to swallow, A little difficult to breath - has to wear a mask. Hard to move because of body aches    She works - trying to work online. Has not had Covid-19 previously      Are any of the following symptoms significant for you?    New or worsening difficulty breathing? Yes -   Please describe what kind of difficulty you are having breathing:mild, more due to wearing    Worsening cough? No    Fever or chills? Yes, I felt feverish or had chills.    Headache:  here and there    Sore throat: YES    Chest pain: No    Diarrhea: No    Body aches? YES    Mirena    What treatments has patient tried? Acetaminophen   Does patient live in a nursing home, group home, or shelter? No  Does patient have a way to get food/medications during quarantined? Yes, I have a friend or family member who can  help me.                  Review of Systems         Objective           Vitals:  No vitals were obtained today due to virtual visit.    Physical Exam     PSYCH: Alert and oriented times 3; coherent speech, normal   rate and volume, able to articulate logical thoughts, able   to abstract reason, no tangential thoughts, no hallucinations   or delusions  Her affect normal  RESP:occasional cough, no audible wheezing, able to talk in full sentences  Remainder of exam unable to be completed due to telephone visits            Phone call duration: 20 minutes

## 2022-10-09 ASSESSMENT — ASTHMA QUESTIONNAIRES
QUESTION_4 LAST FOUR WEEKS HOW OFTEN HAVE YOU USED YOUR RESCUE INHALER OR NEBULIZER MEDICATION (SUCH AS ALBUTEROL): NOT AT ALL
QUESTION_2 LAST FOUR WEEKS HOW OFTEN HAVE YOU HAD SHORTNESS OF BREATH: NOT AT ALL
QUESTION_5 LAST FOUR WEEKS HOW WOULD YOU RATE YOUR ASTHMA CONTROL: COMPLETELY CONTROLLED
QUESTION_3 LAST FOUR WEEKS HOW OFTEN DID YOUR ASTHMA SYMPTOMS (WHEEZING, COUGHING, SHORTNESS OF BREATH, CHEST TIGHTNESS OR PAIN) WAKE YOU UP AT NIGHT OR EARLIER THAN USUAL IN THE MORNING: NOT AT ALL
QUESTION_1 LAST FOUR WEEKS HOW MUCH OF THE TIME DID YOUR ASTHMA KEEP YOU FROM GETTING AS MUCH DONE AT WORK, SCHOOL OR AT HOME: NONE OF THE TIME
ACT_TOTALSCORE: 25

## 2022-10-11 ENCOUNTER — LAB (OUTPATIENT)
Dept: LAB | Facility: CLINIC | Age: 22
End: 2022-10-11
Payer: COMMERCIAL

## 2022-10-11 ENCOUNTER — OFFICE VISIT (OUTPATIENT)
Dept: FAMILY MEDICINE | Facility: CLINIC | Age: 22
End: 2022-10-11
Payer: COMMERCIAL

## 2022-10-11 VITALS
BODY MASS INDEX: 22.98 KG/M2 | SYSTOLIC BLOOD PRESSURE: 90 MMHG | HEART RATE: 87 BPM | DIASTOLIC BLOOD PRESSURE: 68 MMHG | OXYGEN SATURATION: 97 % | TEMPERATURE: 98.6 F | HEIGHT: 66 IN | WEIGHT: 143 LBS

## 2022-10-11 DIAGNOSIS — Z30.430 ENCOUNTER FOR INTRAUTERINE DEVICE PLACEMENT: ICD-10-CM

## 2022-10-11 DIAGNOSIS — N94.89 UTERINE CRAMPING: Primary | ICD-10-CM

## 2022-10-11 DIAGNOSIS — U07.1 INFECTION DUE TO 2019 NOVEL CORONAVIRUS: ICD-10-CM

## 2022-10-11 DIAGNOSIS — N94.6 DYSMENORRHEA: ICD-10-CM

## 2022-10-11 DIAGNOSIS — N94.89 UTERINE CRAMPING: ICD-10-CM

## 2022-10-11 LAB
ALBUMIN UR-MCNC: NEGATIVE MG/DL
APPEARANCE UR: CLEAR
BILIRUB UR QL STRIP: NEGATIVE
CLUE CELLS: ABNORMAL
COLOR UR AUTO: YELLOW
GLUCOSE UR STRIP-MCNC: NEGATIVE MG/DL
HCG UR QL: NEGATIVE
HGB UR QL STRIP: NEGATIVE
KETONES UR STRIP-MCNC: NEGATIVE MG/DL
LEUKOCYTE ESTERASE UR QL STRIP: NEGATIVE
NITRATE UR QL: NEGATIVE
PH UR STRIP: 5.5 [PH] (ref 5–8)
SP GR UR STRIP: <=1.005 (ref 1–1.03)
TRICHOMONAS, WET PREP: ABNORMAL
UROBILINOGEN UR STRIP-ACNC: 0.2 E.U./DL
WBC'S/HIGH POWER FIELD, WET PREP: ABNORMAL
YEAST, WET PREP: ABNORMAL

## 2022-10-11 PROCEDURE — 81001 URINALYSIS AUTO W/SCOPE: CPT

## 2022-10-11 PROCEDURE — 87210 SMEAR WET MOUNT SALINE/INK: CPT | Performed by: STUDENT IN AN ORGANIZED HEALTH CARE EDUCATION/TRAINING PROGRAM

## 2022-10-11 PROCEDURE — 99213 OFFICE O/P EST LOW 20 MIN: CPT | Performed by: STUDENT IN AN ORGANIZED HEALTH CARE EDUCATION/TRAINING PROGRAM

## 2022-10-11 PROCEDURE — 81025 URINE PREGNANCY TEST: CPT | Performed by: STUDENT IN AN ORGANIZED HEALTH CARE EDUCATION/TRAINING PROGRAM

## 2022-10-11 ASSESSMENT — ENCOUNTER SYMPTOMS
MYALGIAS: 0
HEMATURIA: 0
DIARRHEA: 0
PALPITATIONS: 0
NAUSEA: 0
FREQUENCY: 0
HEMATOCHEZIA: 0
ABDOMINAL PAIN: 0
JOINT SWELLING: 0
ARTHRALGIAS: 1
NERVOUS/ANXIOUS: 0
SORE THROAT: 1
EYE PAIN: 0
CONSTIPATION: 0
DIZZINESS: 0
HEARTBURN: 0
COUGH: 1
PARESTHESIAS: 0
HEADACHES: 0
BREAST MASS: 0
WEAKNESS: 0
SHORTNESS OF BREATH: 0
DYSURIA: 0
FEVER: 0
CHILLS: 0

## 2022-10-11 ASSESSMENT — ANXIETY QUESTIONNAIRES
3. WORRYING TOO MUCH ABOUT DIFFERENT THINGS: NOT AT ALL
2. NOT BEING ABLE TO STOP OR CONTROL WORRYING: NOT AT ALL
5. BEING SO RESTLESS THAT IT IS HARD TO SIT STILL: NOT AT ALL
6. BECOMING EASILY ANNOYED OR IRRITABLE: NOT AT ALL
GAD7 TOTAL SCORE: 1
GAD7 TOTAL SCORE: 1
7. FEELING AFRAID AS IF SOMETHING AWFUL MIGHT HAPPEN: NOT AT ALL
1. FEELING NERVOUS, ANXIOUS, OR ON EDGE: SEVERAL DAYS
IF YOU CHECKED OFF ANY PROBLEMS ON THIS QUESTIONNAIRE, HOW DIFFICULT HAVE THESE PROBLEMS MADE IT FOR YOU TO DO YOUR WORK, TAKE CARE OF THINGS AT HOME, OR GET ALONG WITH OTHER PEOPLE: NOT DIFFICULT AT ALL

## 2022-10-11 ASSESSMENT — PATIENT HEALTH QUESTIONNAIRE - PHQ9
SUM OF ALL RESPONSES TO PHQ QUESTIONS 1-9: 3
5. POOR APPETITE OR OVEREATING: NOT AT ALL
10. IF YOU CHECKED OFF ANY PROBLEMS, HOW DIFFICULT HAVE THESE PROBLEMS MADE IT FOR YOU TO DO YOUR WORK, TAKE CARE OF THINGS AT HOME, OR GET ALONG WITH OTHER PEOPLE: NOT DIFFICULT AT ALL
SUM OF ALL RESPONSES TO PHQ QUESTIONS 1-9: 3

## 2022-10-11 ASSESSMENT — ASTHMA QUESTIONNAIRES: ACT_TOTALSCORE: 25

## 2022-10-11 NOTE — PROGRESS NOTES
"  Assessment & Plan       ICD-10-CM    1. Uterine cramping  N94.89 NEISSERIA GONORRHOEA PCR     CHLAMYDIA TRACHOMATIS PCR     UA with Microscopic reflex to Culture - lab collect     Wet prep - Clinic Collect      2. Encounter for intrauterine device placement  Z30.430 HCG qualitative urine      3. Infection due to 2019 novel coronavirus  U07.1       4. Dysmenorrhea  N94.6           Dysmenorrhea/cramping  IUD in place  Patient has only had the IUD in for about 3 years.  Had 1 cycle of cramping.  I do not necessarily think there is any indication for her to get the IUD removed early.  Instead, would advise that we do STI screening, check for UTI and do wet prep.  If negative for infection, would monitor clinically and see if she has recurrent cramping.  If she does, we can certainly do further work-up.  Patient is amenable and comfortable with the plan.    COVID-19 infection:  Patient tested positive for COVID-19 10/3 and then tested negative over the weekend.  She is congested but improving on her own.  Okay to monitor clinically.    Patient will see my colleague, Ania, for her annual physical    Return in about 1 month (around 11/11/2022).    Stacie Morse DO  [facility] [place]    Sushila Mario is a 22 year old, presenting for the following health issues:    IUD (Mirena)    Patient is a pleasant, Healthy 22-year-old with PMH of MDD who presents today for concerns for cramping.    Was initially here for a physical and IUD replacement/removal.  Was made aware that due to time constraints, we can only do 1 of those.    Had IUD placed in 2019 for menorrhagia and dysmenorrhea.  Did really well with the IUD.  Was amenorrheic on the IUD.  Then, on her last cycle-patient had \"really bad cramping\" but did not actually have any spotting or bleeding.  Is concerned that the IUD may not be \"as effective\" and is wondering if we can just switch it out.  She has only had 1 cycle where she has had the " "cramping.    Patient does not have any history of trauma to the area.  She is currently sexually active with several male partners.  Endorses consistent condom use.       Review of Systems   Constitutional: Negative for chills and fever.   HENT: Positive for congestion and sore throat. Negative for ear pain and hearing loss.    Eyes: Negative for pain and visual disturbance.   Respiratory: Positive for cough. Negative for shortness of breath.    Cardiovascular: Negative for chest pain, palpitations and peripheral edema.   Gastrointestinal: Negative for abdominal pain, constipation, diarrhea, heartburn, hematochezia and nausea.   Breasts:  Negative for tenderness, breast mass and discharge.   Genitourinary: Positive for vaginal discharge. Negative for dysuria, frequency, genital sores, hematuria, pelvic pain, urgency and vaginal bleeding.   Musculoskeletal: Positive for arthralgias. Negative for joint swelling and myalgias.   Skin: Negative for rash.   Neurological: Negative for dizziness, weakness, headaches and paresthesias.   Psychiatric/Behavioral: Negative for mood changes. The patient is not nervous/anxious.          Objective    BP 90/68 (BP Location: Right arm, Patient Position: Sitting, Cuff Size: Adult Regular)   Pulse 87   Temp 98.6  F (37  C) (Oral)   Ht 1.665 m (5' 5.55\")   Wt 64.9 kg (143 lb)   SpO2 97%   BMI 23.40 kg/m    Body mass index is 23.4 kg/m .  Physical Exam   GENERAL: healthy, alert and no distress  PSYCH: mentation appears normal, affect normal        "

## 2022-10-12 LAB
BACTERIA #/AREA URNS HPF: ABNORMAL /HPF
RBC #/AREA URNS AUTO: ABNORMAL /HPF
SQUAMOUS #/AREA URNS AUTO: ABNORMAL /LPF
WBC #/AREA URNS AUTO: ABNORMAL /HPF

## 2022-11-04 ENCOUNTER — OFFICE VISIT (OUTPATIENT)
Dept: FAMILY MEDICINE | Facility: CLINIC | Age: 22
End: 2022-11-04
Payer: COMMERCIAL

## 2022-11-04 VITALS
SYSTOLIC BLOOD PRESSURE: 110 MMHG | HEART RATE: 84 BPM | WEIGHT: 147.7 LBS | DIASTOLIC BLOOD PRESSURE: 66 MMHG | HEIGHT: 65 IN | BODY MASS INDEX: 24.61 KG/M2

## 2022-11-04 DIAGNOSIS — N18.2 CHRONIC KIDNEY DISEASE, STAGE II (MILD): ICD-10-CM

## 2022-11-04 DIAGNOSIS — Z00.00 ROUTINE GENERAL MEDICAL EXAMINATION AT A HEALTH CARE FACILITY: Primary | ICD-10-CM

## 2022-11-04 DIAGNOSIS — Z13.220 SCREENING FOR HYPERLIPIDEMIA: ICD-10-CM

## 2022-11-04 LAB
ANION GAP SERPL CALCULATED.3IONS-SCNC: 14 MMOL/L (ref 7–15)
BUN SERPL-MCNC: 14.6 MG/DL (ref 6–20)
CALCIUM SERPL-MCNC: 9.7 MG/DL (ref 8.6–10)
CHLORIDE SERPL-SCNC: 95 MMOL/L (ref 98–107)
CHOLEST SERPL-MCNC: 184 MG/DL
CREAT SERPL-MCNC: 1.08 MG/DL (ref 0.51–0.95)
DEPRECATED HCO3 PLAS-SCNC: 22 MMOL/L (ref 22–29)
GFR SERPL CREATININE-BSD FRML MDRD: 74 ML/MIN/1.73M2
GLUCOSE SERPL-MCNC: 83 MG/DL (ref 70–99)
HDLC SERPL-MCNC: 44 MG/DL
LDLC SERPL CALC-MCNC: 118 MG/DL
NONHDLC SERPL-MCNC: 140 MG/DL
POTASSIUM SERPL-SCNC: 4.5 MMOL/L (ref 3.4–5.3)
SODIUM SERPL-SCNC: 131 MMOL/L (ref 136–145)
TRIGL SERPL-MCNC: 110 MG/DL

## 2022-11-04 PROCEDURE — 0134A COVID-19,PF,MODERNA BIVALENT: CPT

## 2022-11-04 PROCEDURE — 99395 PREV VISIT EST AGE 18-39: CPT | Mod: 25

## 2022-11-04 PROCEDURE — 87591 N.GONORRHOEAE DNA AMP PROB: CPT

## 2022-11-04 PROCEDURE — 99213 OFFICE O/P EST LOW 20 MIN: CPT | Mod: 25

## 2022-11-04 PROCEDURE — 80061 LIPID PANEL: CPT

## 2022-11-04 PROCEDURE — 80048 BASIC METABOLIC PNL TOTAL CA: CPT

## 2022-11-04 PROCEDURE — 91313 COVID-19,PF,MODERNA BIVALENT: CPT

## 2022-11-04 PROCEDURE — 90471 IMMUNIZATION ADMIN: CPT

## 2022-11-04 PROCEDURE — 90715 TDAP VACCINE 7 YRS/> IM: CPT

## 2022-11-04 PROCEDURE — 87491 CHLMYD TRACH DNA AMP PROBE: CPT

## 2022-11-04 PROCEDURE — 36415 COLL VENOUS BLD VENIPUNCTURE: CPT

## 2022-11-04 ASSESSMENT — ENCOUNTER SYMPTOMS
CHILLS: 0
SHORTNESS OF BREATH: 0
DIARRHEA: 0
ARTHRALGIAS: 1
PALPITATIONS: 0
SORE THROAT: 0
WEAKNESS: 0
COUGH: 1
DIZZINESS: 0
HEMATOCHEZIA: 0
NAUSEA: 0
JOINT SWELLING: 0
HEARTBURN: 0
DYSURIA: 0
EYE PAIN: 0
HEADACHES: 0
FREQUENCY: 0
PARESTHESIAS: 0
BREAST MASS: 0
MYALGIAS: 0
CONSTIPATION: 0
HEMATURIA: 0
NERVOUS/ANXIOUS: 0
FEVER: 0
ABDOMINAL PAIN: 0

## 2022-11-04 NOTE — PROGRESS NOTES
SUBJECTIVE:   CC: Shelbi is an 22 year old who presents for preventive health visit.     Patient has been advised of split billing requirements and indicates understanding: Yes  Healthy Habits:     Getting at least 3 servings of Calcium per day:  Yes    Bi-annual eye exam:  Yes    Dental care twice a year:  Yes    Sleep apnea or symptoms of sleep apnea:  None    Diet:  Regular (no restrictions)    Frequency of exercise:  4-5 days/week    Duration of exercise:  15-30 minutes    Taking medications regularly:  Yes    Medication side effects:  Not applicable    PHQ-2 Total Score: 0    Additional concerns today:  No    History of CKD Stage II: Saw nephrology in 2019. Recommended to follow up in a year. Per patient she was told primary care could follow her and just check kidney function labs yearly. She has not had any complications or recent UTIs. No flank pain.    Reports bilateral knee pain and right hip pain. She reports she has been diagnosed with osteoarthritis. She takes tylenol to help when needed, which is very rare. No concerns. It does not limit her day to day activities.     History of asthma. Activity is her stressor. She has a rescue inhaler she uses as needed. She has not used it in 4-5 months. She does not wake up at night coughing. No recent asthma exacerbations or hospitalizations.     History of abdominal cramping. Resolved.      at work. She does not smoke, drink occasionally.   Sexually active: more than one partner, male partner. Condoms. No concerns for STD. No atypical drainage.     Today's PHQ-2 Score:   PHQ-2 ( 1999 Pf[employer]) 11/4/2022   Q1: Little interest or pleasure in doing things 0   Q2: Feeling down, depressed or hopeless 0   PHQ-2 Score 0   PHQ-2 Total Score (12-17 Years)- Positive if 3 or more points; Administer PHQ-A if positive -   Q1: Little interest or pleasure in doing things Not at all   Q2: Feeling down, depressed or hopeless Not at all   PHQ-2 Score 0     She  "has been off since college. She has flare-ups with seasonal depression.     Abuse: Current or Past (Physical, Sexual or Emotional) - No  Do you feel safe in your environment? Yes    Social History     Tobacco Use     Smoking status: Never     Smokeless tobacco: Never   Substance Use Topics     Alcohol use: No     Alcohol Use 11/4/2022   Prescreen: >3 drinks/day or >7 drinks/week? No     Reviewed orders with patient.  Reviewed health maintenance and updated orders accordingly - Yes    Breast Cancer Screening: N/A    History of abnormal Pap smear: NO - age 21-29 PAP every 3 years recommended    Review of Systems   Constitutional: Negative for chills and fever.   HENT: Negative for congestion, ear pain, hearing loss and sore throat.    Eyes: Negative for pain and visual disturbance.   Respiratory: Positive for cough. Negative for shortness of breath.    Cardiovascular: Negative for chest pain, palpitations and peripheral edema.   Gastrointestinal: Negative for abdominal pain, constipation, diarrhea, heartburn, hematochezia and nausea.   Breasts:  Negative for tenderness, breast mass and discharge.   Genitourinary: Negative for dysuria, frequency, genital sores, hematuria, pelvic pain, urgency, vaginal bleeding and vaginal discharge.   Musculoskeletal: Positive for arthralgias. Negative for joint swelling and myalgias.   Skin: Negative for rash.   Neurological: Negative for dizziness, weakness, headaches and paresthesias.   Psychiatric/Behavioral: Negative for mood changes. The patient is not nervous/anxious.      OBJECTIVE:   /66 (BP Location: Right arm, Patient Position: Sitting, Cuff Size: Adult Regular)   Pulse 84   Ht 5' 5\" (1.651 m)   Wt 147 lb 11.2 oz (67 kg)   BMI 24.58 kg/m    Physical Exam  Constitutional:       General: She is not in acute distress.  HENT:      Right Ear: Tympanic membrane, ear canal and external ear normal.      Left Ear: Tympanic membrane, ear canal and external ear normal. "   Eyes:      Extraocular Movements: Extraocular movements intact.      Pupils: Pupils are equal, round, and reactive to light.   Cardiovascular:      Rate and Rhythm: Normal rate and regular rhythm.      Pulses: Normal pulses.      Heart sounds: Normal heart sounds. No murmur heard.  Pulmonary:      Effort: Pulmonary effort is normal.      Breath sounds: Normal breath sounds. No wheezing.   Abdominal:      General: Abdomen is flat. Bowel sounds are normal.      Palpations: There is no mass.      Tenderness: There is no abdominal tenderness.   Musculoskeletal:         General: Normal range of motion.      Cervical back: Normal range of motion. No rigidity or tenderness.      Right lower leg: No edema.      Left lower leg: No edema.   Lymphadenopathy:      Cervical: No cervical adenopathy.   Skin:     General: Skin is warm and dry.      Comments: No rashes, moles, or lesions to her back   Neurological:      General: No focal deficit present.      Mental Status: She is alert and oriented to person, place, and time. Mental status is at baseline.      Cranial Nerves: No cranial nerve deficit.      Motor: No weakness.      Gait: Gait normal.   Psychiatric:         Mood and Affect: Mood normal.         Thought Content: Thought content normal.         ASSESSMENT/PLAN:   1. Routine general medical examination at a health care facility  On exam, no atypical findings. She has no complaints. She was previously evaluated for abdominal cramping, but this has resolved. She is hoping to move to [place] in the next year or two so she may have to establish care elsewhere. Vaccines ordered as appropriate.   - REVIEW OF HEALTH MAINTENANCE PROTOCOL ORDERS  - TDAP VACCINE (Adacel, Boostrix)  - COVID-19,PF,MODERNA BIVALENT 18+Yrs    2. Screening for hyperlipidemia  Last completed three years ago. Will check today.  - Lipid panel reflex to direct LDL Non-fasting    3. 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 No recent UTI or concerns. Last creatinine 1.06 and GFR 75. Will recheck labs today. No concerning s/s.   - Albumin Random Urine Quantitative with Creat Ratio; Future  - BASIC METABOLIC PANEL; Future    Patient has been advised of split billing requirements and indicates understanding: Yes    COUNSELING:  Reviewed preventive health counseling, as reflected in patient instructions       Regular exercise       Healthy diet/nutrition       Safe sex practices/STD prevention       Consider Hep C screening for all patients one time for ages 18-79 years       HIV screeninx in teen years, 1x in adult years, and at intervals if high risk    Estimated body mass index is 24.58 kg/m  as calculated from the following:    Height as of this encounter: 5' 5\" (1.651 m).    Weight as of this encounter: 147 lb 11.2 oz (67 kg).      She reports that she has never smoked. She has never used smokeless tobacco.    At the end of the visit, I confirmed understanding of what was discussed. Patient has not further questions or concerns that were brought up at this time.     Ania Ramos, RYLAND, APRN, FNP-C  "

## 2022-11-04 NOTE — LETTER
My Asthma Action Plan    Name: Shelbi Johnson   YOB: 2000  Date: 11/4/2022   My doctor: ABNER Osborne CNP   My clinic: [facility]        My Rescue Medicine:   Albuterol inhaler (Proair/Ventolin/Proventil HFA)  2-4 puffs EVERY 4 HOURS as needed. Use a spacer if recommended by your provider.   My Asthma Severity:   Intermittent / Exercise Induced  Know your asthma triggers: smoke and exercise or sports             GREEN ZONE   Good Control    I feel good    No cough or wheeze    Can work, sleep and play without asthma symptoms       Take your asthma control medicine every day.     1. If exercise triggers your asthma, take your rescue medication    15 minutes before exercise or sports, and    During exercise if you have asthma symptoms  2. Spacer to use with inhaler: If you have a spacer, make sure to use it with your inhaler             YELLOW ZONE Getting Worse  I have ANY of these:    I do not feel good    Cough or wheeze    Chest feels tight    Wake up at night   1. Keep taking your Green Zone medications  2. Start taking your rescue medicine:    every 20 minutes for up to 1 hour. Then every 4 hours for 24-48 hours.  3. If you stay in the Yellow Zone for more than 12-24 hours, contact your doctor.  4. If you do not return to the Green Zone in 12-24 hours or you get worse, start taking your oral steroid medicine if prescribed by your provider.           RED ZONE Medical Alert - Get Help  I have ANY of these:    I feel awful    Medicine is not helping    Breathing getting harder    Trouble walking or talking    Nose opens wide to breathe       1. Take your rescue medicine NOW  2. If your provider has prescribed an oral steroid medicine, start taking it NOW  3. Call your doctor NOW  4. If you are still in the Red Zone after 20 minutes and you have not reached your doctor:    Take your rescue medicine again and    Call 911 or go to the emergency room right  away    See your regular doctor within 2 weeks of an Emergency Room or Urgent Care visit for follow-up treatment.          Annual Reminders:  Meet with Asthma Educator,  Flu Shot in the Fall, consider Pneumonia Vaccination for patients with asthma (aged 19 and older).    Pharmacy:    [facility] [address] [facility] [employer] - [place], [place] - [address] [facility] [address] [facility]    Electronically signed by ABNER Osborne CNP   Date: 11/04/22                    Asthma Triggers  How To Control Things That Make Your Asthma Worse    Triggers are things that make your asthma worse.  Look at the list below to help you find your triggers and   what you can do about them. You can help prevent asthma flare-ups by staying away from your triggers.      Trigger                                                          What you can do   Cigarette Smoke  Tobacco smoke can make asthma worse. Do not allow smoking in your home, car or around you.  Be sure no one smokes at a child s day care or school.  If you smoke, ask your health care provider for ways to help you quit.  Ask family members to quit too.  Ask your health care provider for a referral to Quit Plan to help you quit smoking, or call 0-043-069-PLAN.     Colds, Flu, Bronchitis  These are common triggers of asthma. Wash your hands often.  Don t touch your eyes, nose or mouth.  Get a flu shot every year.     Dust Mites  These are tiny bugs that live in cloth or carpet. They are too small to see. Wash sheets and blankets in hot water every week.   Encase pillows and mattress in dust mite proof covers.  Avoid having carpet if you can. If you have carpet, vacuum weekly.   Use a dust mask and HEPA vacuum.   Pollen and Outdoor Mold  Some people are allergic to trees, grass, or weed pollen, or molds. Try to keep your windows closed.  Limit time out doors when pollen count is high.   Ask you health care provider about taking medicine during  allergy season.     Animal Dander  Some people are allergic to skin flakes, urine or saliva from pets with fur or feathers. Keep pets with fur or feathers out of your home.    If you can t keep the pet outdoors, then keep the pet out of your bedroom.  Keep the bedroom door closed.  Keep pets off cloth furniture and away from stuffed toys.     Mice, Rats, and Cockroaches  Some people are allergic to the waste from these pests.   Cover food and garbage.  Clean up spills and food crumbs.  Store grease in the refrigerator.   Keep food out of the bedroom.   Indoor Mold  This can be a trigger if your home has high moisture. Fix leaking faucets, pipes, or other sources of water.   Clean moldy surfaces.  Dehumidify basement if it is damp and smelly.   Smoke, Strong Odors, and Sprays  These can reduce air quality. Stay away from strong odors and sprays, such as perfume, powder, hair spray, paints, smoke incense, paint, cleaning products, candles and new carpet.   Exercise or Sports  Some people with asthma have this trigger. Be active!  Ask your doctor about taking medicine before sports or exercise to prevent symptoms.    Warm up for 5-10 minutes before and after sports or exercise.     Other Triggers of Asthma  Cold air:  Cover your nose and mouth with a scarf.  Sometimes laughing or crying can be a trigger.  Some medicines and food can trigger asthma.

## 2022-11-05 LAB
C TRACH DNA SPEC QL NAA+PROBE: NEGATIVE
N GONORRHOEA DNA SPEC QL NAA+PROBE: NEGATIVE

## 2022-11-15 DIAGNOSIS — E87.1 HYPONATREMIA: Primary | ICD-10-CM

## 2022-11-23 ENCOUNTER — TELEPHONE (OUTPATIENT)
Dept: FAMILY MEDICINE | Facility: CLINIC | Age: 22
End: 2022-11-23

## 2022-11-23 NOTE — TELEPHONE ENCOUNTER
Patient Returning Call    Reason for call:  Received message to call clinic    Information relayed to patient:  Mona Ramos's message relayed to patient.  Accepted offer to schedule lab appointment 4PM Tuesday 11/29/22    Patient has additional questions:  No    What are your questions/concerns:  N/A

## 2022-11-23 NOTE — TELEPHONE ENCOUNTER
Called patient and LM to return call, please relay the unread [employer] message below to patient when she returns call.     I sent a message via [employer], but patient has not viewed,  please call:       Your sodium level was down slightly on your lab work 11 days  ago. I think we should recheck this level and I have placed an  order for this in your chart. You would just need to make an  appointment for a lab visit by calling [phone]. Signs and  symptoms of low sodium include nausea, overall not feeling  well, headaches, and fatigue. I don't recall you having any of  these present during our visit, and your fluid status on exam  was not concerning. I do think it would be valuable to recheck  to make sure it has corrected or see if it has gone down  further.       Your cholesterol screening looked okay. Your good  cholesterol was slightly lower than we would like and your bad  cholesterol was slightly higher than we would like. Continue  diet and exercise and try to avoid trans fats, eat more fruits and  vegetables, and eat more fish/chicken and limit red meat  consumption.       I will watch for your sodium level to result with the recheck.      Ami RENE CMA  [facility] Clinical Staff

## 2022-11-29 ENCOUNTER — LAB (OUTPATIENT)
Dept: LAB | Facility: CLINIC | Age: 22
End: 2022-11-29
Payer: COMMERCIAL

## 2022-11-29 DIAGNOSIS — E87.1 HYPONATREMIA: ICD-10-CM

## 2022-11-29 LAB
ANION GAP SERPL CALCULATED.3IONS-SCNC: 12 MMOL/L (ref 7–15)
BUN SERPL-MCNC: 21.2 MG/DL (ref 6–20)
CALCIUM SERPL-MCNC: 9.1 MG/DL (ref 8.6–10)
CHLORIDE SERPL-SCNC: 104 MMOL/L (ref 98–107)
CREAT SERPL-MCNC: 1.32 MG/DL (ref 0.51–0.95)
DEPRECATED HCO3 PLAS-SCNC: 26 MMOL/L (ref 22–29)
GFR SERPL CREATININE-BSD FRML MDRD: 58 ML/MIN/1.73M2
GLUCOSE SERPL-MCNC: 85 MG/DL (ref 70–99)
POTASSIUM SERPL-SCNC: 4.4 MMOL/L (ref 3.4–5.3)
SODIUM SERPL-SCNC: 142 MMOL/L (ref 136–145)

## 2022-11-29 PROCEDURE — 36415 COLL VENOUS BLD VENIPUNCTURE: CPT

## 2022-11-29 PROCEDURE — 80048 BASIC METABOLIC PNL TOTAL CA: CPT

## 2022-12-02 ENCOUNTER — TELEPHONE (OUTPATIENT)
Dept: FAMILY MEDICINE | Facility: CLINIC | Age: 22
End: 2022-12-02

## 2022-12-02 DIAGNOSIS — R94.4 DECREASED GFR: Primary | ICD-10-CM

## 2022-12-02 NOTE — TELEPHONE ENCOUNTER
Pt returning clinic call re: GFR and BUN levels    FNA read note below. No additional explanation needed as message answered all pt's questions.    FNA helped her schedule lab appointment in 3 weeks. Pt was transferred to .    Naomy Raphael RN/[place] Nurse Advisor

## 2022-12-02 NOTE — TELEPHONE ENCOUNTER
I called patient's mom to try and get a hold of patient as her cell phone is not working.  Verbalized I had the right number but would give the [facility] patient to call.  I was calling to talk to her about her recent results.     Please inform patient:    We checked her basic metabolic panel when we last visited and her sodium was low.  I had her recheck this month later.  On her recheck from 3 days ago her sodium has corrected which is great, however her kidney function is down in regard to her GFR, and her urea nitrogen and creatinine are elevated.  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    I have placed an order for a recheck on the kidney function in 3 weeks.  If she could please schedule this with lab and I will watch for the results.  Recommend to avoid any NSAIDs like ibuprofen or Aleve during this time, limit alcohol intake, and try and drink lots of water so we can try and get an accurate representation of the kidney function.  We will revisit after these results, though if the kidney function is still down I would want to consider sending her back to nephrology.    Let me know if she has any questions.     Ania Ramos DNP, APRN, FNP-C

## 2022-12-20 ENCOUNTER — LAB (OUTPATIENT)
Dept: LAB | Facility: CLINIC | Age: 22
End: 2022-12-20
Payer: COMMERCIAL

## 2022-12-20 DIAGNOSIS — R94.4 DECREASED GFR: ICD-10-CM

## 2022-12-20 LAB
ANION GAP SERPL CALCULATED.3IONS-SCNC: 12 MMOL/L (ref 7–15)
BUN SERPL-MCNC: 18.8 MG/DL (ref 6–20)
CALCIUM SERPL-MCNC: 10.1 MG/DL (ref 8.6–10)
CHLORIDE SERPL-SCNC: 103 MMOL/L (ref 98–107)
CREAT SERPL-MCNC: 1.24 MG/DL (ref 0.51–0.95)
DEPRECATED HCO3 PLAS-SCNC: 25 MMOL/L (ref 22–29)
GFR SERPL CREATININE-BSD FRML MDRD: 63 ML/MIN/1.73M2
GLUCOSE SERPL-MCNC: 94 MG/DL (ref 70–99)
POTASSIUM SERPL-SCNC: 4.6 MMOL/L (ref 3.4–5.3)
SODIUM SERPL-SCNC: 140 MMOL/L (ref 136–145)

## 2022-12-20 PROCEDURE — 80048 BASIC METABOLIC PNL TOTAL CA: CPT

## 2022-12-20 PROCEDURE — 36415 COLL VENOUS BLD VENIPUNCTURE: CPT

## 2023-02-22 ENCOUNTER — OFFICE VISIT (OUTPATIENT)
Dept: FAMILY MEDICINE | Facility: CLINIC | Age: 23
End: 2023-02-22
Payer: COMMERCIAL

## 2023-02-22 VITALS
SYSTOLIC BLOOD PRESSURE: 110 MMHG | BODY MASS INDEX: 25.47 KG/M2 | WEIGHT: 152.9 LBS | HEART RATE: 64 BPM | HEIGHT: 65 IN | DIASTOLIC BLOOD PRESSURE: 66 MMHG | RESPIRATION RATE: 16 BRPM

## 2023-02-22 DIAGNOSIS — R42 DIZZINESS: ICD-10-CM

## 2023-02-22 DIAGNOSIS — R11.0 NAUSEA: Primary | ICD-10-CM

## 2023-02-22 DIAGNOSIS — R42 VERTIGO: ICD-10-CM

## 2023-02-22 DIAGNOSIS — Z87.448: ICD-10-CM

## 2023-02-22 LAB
ALBUMIN SERPL BCG-MCNC: 4.5 G/DL (ref 3.5–5.2)
ALP SERPL-CCNC: 79 U/L (ref 35–104)
ALT SERPL W P-5'-P-CCNC: 13 U/L (ref 10–35)
ANION GAP SERPL CALCULATED.3IONS-SCNC: 14 MMOL/L (ref 7–15)
AST SERPL W P-5'-P-CCNC: 29 U/L (ref 10–35)
BILIRUB SERPL-MCNC: 0.8 MG/DL
BUN SERPL-MCNC: 16.7 MG/DL (ref 6–20)
CALCIUM SERPL-MCNC: 10.1 MG/DL (ref 8.6–10)
CHLORIDE SERPL-SCNC: 104 MMOL/L (ref 98–107)
CREAT SERPL-MCNC: 1.1 MG/DL (ref 0.51–0.95)
CREAT UR-MCNC: 51.9 MG/DL
DEPRECATED HCO3 PLAS-SCNC: 23 MMOL/L (ref 22–29)
ERYTHROCYTE [DISTWIDTH] IN BLOOD BY AUTOMATED COUNT: 11.5 % (ref 10–15)
GFR SERPL CREATININE-BSD FRML MDRD: 73 ML/MIN/1.73M2
GLUCOSE SERPL-MCNC: 89 MG/DL (ref 70–99)
HBA1C MFR BLD: 5.3 % (ref 0–5.6)
HCG UR QL: NEGATIVE
HCT VFR BLD AUTO: 46.6 % (ref 35–47)
HGB BLD-MCNC: 15.5 G/DL (ref 11.7–15.7)
MCH RBC QN AUTO: 31.1 PG (ref 26.5–33)
MCHC RBC AUTO-ENTMCNC: 33.3 G/DL (ref 31.5–36.5)
MCV RBC AUTO: 94 FL (ref 78–100)
MICROALBUMIN UR-MCNC: <12 MG/L
MICROALBUMIN/CREAT UR: NORMAL MG/G{CREAT}
PLATELET # BLD AUTO: 177 10E3/UL (ref 150–450)
POTASSIUM SERPL-SCNC: 4.9 MMOL/L (ref 3.4–5.3)
PROT SERPL-MCNC: 7.4 G/DL (ref 6.4–8.3)
RBC # BLD AUTO: 4.98 10E6/UL (ref 3.8–5.2)
SODIUM SERPL-SCNC: 141 MMOL/L (ref 136–145)
TSH SERPL DL<=0.005 MIU/L-ACNC: 0.4 UIU/ML (ref 0.3–4.2)
WBC # BLD AUTO: 5.6 10E3/UL (ref 4–11)

## 2023-02-22 PROCEDURE — 84443 ASSAY THYROID STIM HORMONE: CPT

## 2023-02-22 PROCEDURE — 99214 OFFICE O/P EST MOD 30 MIN: CPT

## 2023-02-22 PROCEDURE — 80053 COMPREHEN METABOLIC PANEL: CPT

## 2023-02-22 PROCEDURE — 36416 COLLJ CAPILLARY BLOOD SPEC: CPT

## 2023-02-22 PROCEDURE — 85027 COMPLETE CBC AUTOMATED: CPT

## 2023-02-22 PROCEDURE — 82570 ASSAY OF URINE CREATININE: CPT

## 2023-02-22 PROCEDURE — 36415 COLL VENOUS BLD VENIPUNCTURE: CPT

## 2023-02-22 PROCEDURE — 81025 URINE PREGNANCY TEST: CPT

## 2023-02-22 PROCEDURE — 82043 UR ALBUMIN QUANTITATIVE: CPT

## 2023-02-22 PROCEDURE — 83036 HEMOGLOBIN GLYCOSYLATED A1C: CPT

## 2023-02-22 RX ORDER — ONDANSETRON 4 MG/1
4 TABLET, ORALLY DISINTEGRATING ORAL EVERY 8 HOURS PRN
Qty: 20 TABLET | Refills: 0 | Status: SHIPPED | OUTPATIENT
Start: 2023-02-22 | End: 2023-05-16

## 2023-02-22 ASSESSMENT — ENCOUNTER SYMPTOMS
PARESTHESIAS: 0
ABDOMINAL PAIN: 0
HEADACHES: 0
SHORTNESS OF BREATH: 0
LIGHT-HEADEDNESS: 1
WEAKNESS: 0
FATIGUE: 0
TREMORS: 0
DIARRHEA: 0
FEVER: 0
DIZZINESS: 1
SLEEP DISTURBANCE: 0
NUMBNESS: 0
STRIDOR: 0

## 2023-02-22 ASSESSMENT — PAIN SCALES - GENERAL: PAINLEVEL: NO PAIN (0)

## 2023-02-22 NOTE — PROGRESS NOTES
Assessment & Plan     Nausea  Unclear etiology at this point, do not think it is infectious as she is not having any diarrhea, fevers, and symptoms have been going on for over a year.  We we will recheck a urine pregnancy today.  She does have a family history of thyroid disease and diabetes so we will check these levels.  We will recheck electrolytes and kidney function today.  Weight is stable and is actually up from previous readings.  She is still getting adequate nutrition and hydration per her reports and no evidence of hypovolemia on exam today.  Bowel sounds are active, no abdominal pain or distention.  She is having normal bowel movements.  If true vertigo diagnosed at PT, may see nausea improve with these therapies.  We will work-up further as needed, but as of now I think lets check blood work for an obvious cause for the nausea, correct as needed, and see what PT has to say when they do vestibular testing.  I will start her on Zofran as needed for the nausea, but emphasized that I do not want this to be used indefinitely and we should try and identify the cause of the nausea.  She agrees.  - Comprehensive metabolic panel (BMP + Alb, Alk Phos, ALT, AST, Total. Bili, TP); Future  - Hemoglobin A1c; Future  - CBC with platelets; Future  - TSH with free T4 reflex; Future  - ondansetron (ZOFRAN ODT) 4 MG ODT tab; Take 1 tablet (4 mg) by mouth every 8 hours as needed for nausea  - HCG Qual, Urine ([id number])    Vertigo  Dizziness  Patient is describing symptoms of vertigo when she explains her dizziness and has relief of symptoms when she ground herself.  It is made worse with movements and just with EOM testing.  We will place referral for PT for vestibular testing.  We will treat as appropriate.  - Physical Therapy Referral; Future  - Comprehensive metabolic panel (BMP + Alb, Alk Phos, ALT, AST, Total. Bili, TP)  - Hemoglobin A1c  - CBC with platelets  - TSH with free T4 reflex    History of kidney disease as  a child  Due for microalbumin, ordered.  Previously we have followed her kidney function and it has been stable.  She has been signed off by nephrology and was told that family practice can follow just with regular monitoring.  Certainly she can see them with any concerns.    Return if symptoms worsen or fail to improve.    Sushila Mario is a 22 year old, presenting for the following health issues:  Nausea (Intermittent nausea for a year) and Dizziness (Intermittent dizziness for the past 3 months)    History of Present Illness       Reason for visit:  Nausea and dizziness    She eats 0-1 servings of fruits and vegetables daily.She consumes 0 sweetened beverage(s) daily.She exercises with enough effort to increase her heart rate 10 to 19 minutes per day.  She exercises with enough effort to increase her heart rate 6 days per week.   She is taking medications regularly.     Patient presents for nausea that has been on and off for about a year.  It can start the second she wakes up and last all day and some days it is just sporadic.  The only time she does not have nausea is when she was sleeping.  She has been monitoring food intake and there are no foods that trigger it.  She has not vomited.  She does have an appetite but she stops herself when she feels like she is going to get nauseous.  She does not necessarily feel focal, she just thinks she will get nauseous if she eats anymore and stops.  She has had to have did decreased oral intake because of this, but is eating and drinking daily.  She has no abdominal pain with this.  No fevers. No problems with urination.    He also reports dizziness that started about 3 months ago.  It has not gone away.  It varies in severity.  It is not worse at any time.  She describes it as feeling like she is on a merry-go-round.  She reports she feels like she is moving and not like the surroundings are moving.  She has no ringing in her ears and no vision changes with  "this.  She does note that symptoms seem to get worse when she changes positions.  When she is in bed laying down she does have symptoms.  She can help the symptoms by putting her foot on the floor outside of the bed to \"ground herself.\"  She does also get symptoms when she looks back and forth from 1 screen to another.    Review of Systems   Constitutional: Negative for fatigue and fever.   HENT: Negative for tinnitus.    Respiratory: Negative for shortness of breath and stridor.    Cardiovascular: Negative for chest pain.   Gastrointestinal: Negative for abdominal pain and diarrhea.   Neurological: Positive for dizziness and light-headedness. Negative for tremors, weakness, numbness, headaches and paresthesias.   Psychiatric/Behavioral: Negative for mood changes and sleep disturbance.          Objective    /66 (BP Location: Right arm, Patient Position: Sitting, Cuff Size: Adult Regular)   Pulse 64   Resp 16   Ht 1.651 m (5' 5\")   Wt 69.4 kg (152 lb 14.4 oz)   BMI 25.44 kg/m    Body mass index is 25.44 kg/m .     Physical Exam  Constitutional:       General: She is not in acute distress.  HENT:      Head: Normocephalic.      Right Ear: Tympanic membrane, ear canal and external ear normal.      Left Ear: Tympanic membrane, ear canal and external ear normal.      Nose: Nose normal. No congestion or rhinorrhea.      Mouth/Throat:      Mouth: Mucous membranes are moist.      Pharynx: Oropharynx is clear. No oropharyngeal exudate or posterior oropharyngeal erythema.   Eyes:      Extraocular Movements: Extraocular movements intact.      Pupils: Pupils are equal, round, and reactive to light.      Comments: Dizziness reported with EOM testing   Cardiovascular:      Rate and Rhythm: Normal rate and regular rhythm.      Heart sounds: Normal heart sounds. No murmur heard.  Pulmonary:      Effort: Pulmonary effort is normal. No respiratory distress.      Breath sounds: Normal breath sounds. No wheezing.   Abdominal: "      General: Bowel sounds are normal.   Musculoskeletal:      Cervical back: Normal range of motion. No rigidity or tenderness.      Right lower leg: No edema.      Left lower leg: No edema.   Lymphadenopathy:      Cervical: No cervical adenopathy.   Neurological:      General: No focal deficit present.      Mental Status: She is alert. Mental status is at baseline.      Cranial Nerves: No cranial nerve deficit.      Sensory: No sensory deficit.      Motor: No weakness.      Gait: Gait normal.      Comments: Dizziness reported during testing.    Psychiatric:         Mood and Affect: Mood normal.         Thought Content: Thought content normal.        At the end of the visit, I confirmed understanding of what was discussed. Patient has no further questions or concerns that were brought up at this time.     Ania Ramos, RYLAND, APRN, FNP-C

## 2023-03-08 ENCOUNTER — HOSPITAL ENCOUNTER (OUTPATIENT)
Dept: PHYSICAL THERAPY | Facility: REHABILITATION | Age: 23
Discharge: HOME | End: 2023-03-08
Payer: COMMERCIAL

## 2023-03-08 DIAGNOSIS — R42 DIZZINESS: ICD-10-CM

## 2023-03-08 DIAGNOSIS — R42 VERTIGO: ICD-10-CM

## 2023-03-08 PROCEDURE — 97161 PT EVAL LOW COMPLEX 20 MIN: CPT | Mod: GP | Performed by: PHYSICAL THERAPIST

## 2023-03-08 PROCEDURE — 97112 NEUROMUSCULAR REEDUCATION: CPT | Mod: GP | Performed by: PHYSICAL THERAPIST

## 2023-03-08 NOTE — PROGRESS NOTES
03/08/23 1000   Quick Adds   Quick Adds Vestibular Eval   Type of Visit Initial OP PT Evaluation   General Information   Start of Care Date 03/08/23   Referring Physician Mona Ramos   Orders Evaluate and Treat as Indicated   Order Date 02/22/23   Medical Diagnosis Vertigo, dizziness   Onset of illness/injury or Date of Surgery 02/01/23   Precautions/Limitations no known precautions/limitations   Surgical/Medical history reviewed Yes   Pertinent history of current problem (include personal factors and/or comorbidities that impact the POC) Pt is a 22 year-old woman with chief complaint dizziness. Pt reports that she has had nausea on/off for over a year now - sometimes can t keep fluids down or barely any food, but hasn t thrown up yet. Then in the past month started to get dizzy. Woke up one day extremely dizzy - felt like she was on a device that was spinning her all around - a mix of room spinning and her own body spinning. Can feel like on a constant merry go round. Sleeping is the only thing that will stop the spinning. Some days she can wake without dizziness and did have 2 days recently without any dizziness. Other days she can wake with dizziness and  other days she can have dizziness that worsens during the day. Even sitting still she feels like she s spinning. Even just moving her eyes can cause it. Walking can make it worse - now her vision feels like it is moving with every step. No headache, neck pain. Driving doesn t make it worse, but if she s the passenger, it is worse. Potholes make it worse - messes with the vision and feels like everything spins for a second or two. Sometimes it sounds like something is pressurized (like when ears haven t popped on a plane) - can be one or both ears, but doesn t affect the dizziness or correlate well with the dizziness. Can feel like the hearing is more muffled. Can t hear her heart beat or other sounds in her ear. No injuries. She is drinking about 64 oz of  water/day and her diet is improving. For work, pt does cash receipts with 3 computer screens - has to scan around them, doing a [place] with eyes/head for 3-4 hours straight and this can be challenging. Taking dramamine for the past week when going to work. Since onset, symptoms are the same, not significantly different.   Patient role/Employment history Employed   Patient/Family Goals Statement Get rid of dizziness   Fall Risk Screen   Fall screen completed by PT   Have you fallen 2 or more times in the past year? No   Have you fallen and had an injury in the past year? No   Is patient a fall risk? No   Abuse Screen (yes response referral indicated)   Feels Unsafe at Home or Work/School no   Feels Threatened by Someone no   Does Anyone Try to Keep You From Having Contact with Others or Doing Things Outside Your Home? no   Physical Signs of Abuse Present no   Patient needs abuse support services and resources No   System Outcome Measures   Outcome Measures BPPV   Dizziness Handicap Inventory (score out of 100) A decrease in score by 17.18 or greater indicates a clinically significant change in symptoms. 54   Pain   Patient currently in pain No   Gait   Gait Comments WNL for speed, no veering, pt reporting that her vision bounces while walking   Balance   Balance Comments ambulation with head turns left/right and up/down with increased dizziness, LOB with head up/down, tandem ambulation x 10 steps with mild imbalance   Balance Special Tests   Balance Special Tests Modified CTSIB Conditions   Balance Special Tests Modified CTSIB Conditions   Condition 1, seconds 30 Seconds   Condition 2, seconds 30 Seconds   Condition 4, seconds 30 Seconds   Condition 5, seconds 8 Seconds   Modified CTSIB Comments mild sway condition 2 and 4, LOB condition 5 requiring UE support   Cervicogenic Screen   Neck ROM flx limited by 10% with tipping forward sensation, ext WNL with falling sensation at end range, R rotation WNL, L rotation  WNL, R side bending WNL with feeling that the body is moving to the side, L side bending WNL until returning to neutral felt more tilting; moving slow throughout   Oculomotor Exam   Smooth Pursuit Abnormal   Smooth Pursuit Comment felt dizzy and ear pressure   Saccades Abnormal   Saccades Comments slow vertically with symptoms, faster with horizontal but with convergence after first 5 sec with first attempt (improved after that)   VOR Abnormal   VOR Comments slow pace, symptomatic immediately with horizontal movements, faster speed vertically with less symptoms   Rapid Head Thrust Corrective Saccade L head thrust   Rapid Head Thrust Comments pressure in the ears   Convergence Testing Normal   Convergence Testing Comments 6.5 cm   Infrared Goggle Exam or Frenzel Lense Exam   Vestibular Suppressant in Last 24 Hours? No   Exam completed with Infrared Goggles   Spontaneous Nystagmus Negative   Gaze Evoked Nystagmus Negative   Head Shake Horizontal Nystagmus Negative   Head Shake Horizontal Nystagmus comments pt felt like she was spinning after head shake test, no nystagmus noted   Planned Therapy Interventions   Planned Therapy Interventions ADL retraining;balance training;bed mobility training;gait training;joint mobilization;motor coordination training;neuromuscular re-education;ROM;strengthening;stretching;transfer training;manual therapy;other (see comments)  (CRM, vestibular adaptation and habituation)   Clinical Impression   Criteria for Skilled Therapeutic Interventions Met yes, treatment indicated   PT Diagnosis Dizziness   Influenced by the following impairments dizziness, impaired VOR, impaired balance   Functional limitations due to impairments Difficulty tolerating work tasks and most functional mobility d/t dizziness.   Clinical Presentation Stable/Uncomplicated   Clinical Decision Making (Complexity) Low complexity   Therapy Frequency other (see comments)   Predicted Duration of Therapy Intervention  (days/wks) 1x/week or every other week, 6-10 visits as needed   Risk & Benefits of therapy have been explained Yes   Patient, Family & other staff in agreement with plan of care Yes   Clinical Impression Comments Pt is a 22 year-old woman with chief complaint dizziness. She demonstrates symptoms most consistent with vestibular hypofunction (left-sided testing positive). She demonstrates symptoms with oculomotor testing and VOR x 1 and feels that her vision is unstable during ambulation and driving. She has dizziness that lasts while sitting, standing, walking and with work tasks. She is appropriate for skilled PT to address impairments, instruct in HEP to return to PLOF.   GOALS   PT Eval Goals 1   Goal 1   Goal Identifier Dizziness   Goal Description Pt will report no dizziness with bed mobility, when sitting, standing and walking or with work task to return to PLOF.   Target Date 05/17/23   Total Evaluation Time   PT Eval, Low Complexity Minutes (46025) 32     Jaquan Roque, PT, DPT, CLT  3/8/2023

## 2023-03-09 ENCOUNTER — TELEPHONE (OUTPATIENT)
Dept: FAMILY MEDICINE | Facility: CLINIC | Age: 23
End: 2023-03-09
Payer: COMMERCIAL

## 2023-03-09 NOTE — TELEPHONE ENCOUNTER
Can we call patient and ask to make a telephone visit with me to discuss? I can send to neurology but it may take a while to get in, I also can order some imaging or try some other medications, but would want to discuss the options with her.

## 2023-03-09 NOTE — TELEPHONE ENCOUNTER
FYI - Status Update    Who is Calling: patient    Update: Patient reports she completed Vertigo testing yesterday and was told it is not vertigo.  She was given some exercises to try.    Patient shares she is no longer able to drive and is wondering what other testing can be done?    Does caller want a call/response back: Yes     Could we send this information to you in [email or web address] or would you prefer to receive a phone call?:   Patient would like to be contacted via [email or web address]

## 2023-03-10 ENCOUNTER — MYC MEDICAL ADVICE (OUTPATIENT)
Dept: FAMILY MEDICINE | Facility: CLINIC | Age: 23
End: 2023-03-10
Payer: COMMERCIAL

## 2023-03-15 ENCOUNTER — VIRTUAL VISIT (OUTPATIENT)
Dept: FAMILY MEDICINE | Facility: CLINIC | Age: 23
End: 2023-03-15
Payer: COMMERCIAL

## 2023-03-15 DIAGNOSIS — R42 DIZZINESS: Primary | ICD-10-CM

## 2023-03-15 DIAGNOSIS — R11.0 NAUSEA: ICD-10-CM

## 2023-03-15 PROCEDURE — 99213 OFFICE O/P EST LOW 20 MIN: CPT | Mod: 95

## 2023-03-15 ASSESSMENT — ANXIETY QUESTIONNAIRES
IF YOU CHECKED OFF ANY PROBLEMS ON THIS QUESTIONNAIRE, HOW DIFFICULT HAVE THESE PROBLEMS MADE IT FOR YOU TO DO YOUR WORK, TAKE CARE OF THINGS AT HOME, OR GET ALONG WITH OTHER PEOPLE: NOT DIFFICULT AT ALL
GAD7 TOTAL SCORE: 10
3. WORRYING TOO MUCH ABOUT DIFFERENT THINGS: NEARLY EVERY DAY
6. BECOMING EASILY ANNOYED OR IRRITABLE: NOT AT ALL
1. FEELING NERVOUS, ANXIOUS, OR ON EDGE: NEARLY EVERY DAY
2. NOT BEING ABLE TO STOP OR CONTROL WORRYING: NEARLY EVERY DAY
7. FEELING AFRAID AS IF SOMETHING AWFUL MIGHT HAPPEN: NOT AT ALL
GAD7 TOTAL SCORE: 10
5. BEING SO RESTLESS THAT IT IS HARD TO SIT STILL: NOT AT ALL

## 2023-03-15 ASSESSMENT — PATIENT HEALTH QUESTIONNAIRE - PHQ9
5. POOR APPETITE OR OVEREATING: SEVERAL DAYS
SUM OF ALL RESPONSES TO PHQ QUESTIONS 1-9: 5

## 2023-03-15 NOTE — PROGRESS NOTES
Shelbi is a 22 year old who is being evaluated via a billable telephone visit.      What phone number would you like to be contacted at? [phone]  How would you like to obtain your AVS? Ruddy    Distant Location (provider location):  On-site    Assessment & Plan     Dizziness  Chronic, worsening exacerbations and no improvement with Dramamine.  She was seen by PT who did not find any evidence of vertigo.  Discussed with patient that we could pursue an MRI to look for any neurological changes from the dizziness.  She is agreeable to this and would like to have this done.  Telephone visit today, so no neurological exam completed, though patient appears to be at baseline based on conversation today.  - MR Brain w/o Contrast; Future    Nausea  Discussed how we should try omeprazole at bedtime and see if this helps with the nausea as acid reflux can cause morning nausea after laying flat all night.  She is agreeable to this.  Ordered.  We will also get an MRI to rule out any neurological causes of the nausea.  If persists and omeprazole is not beneficial we would want to send to GI.  - MR Brain w/o Contrast; Future  - omeprazole (PRILOSEC) 20 MG DR capsule; Take 1 capsule (20 mg) by mouth daily     Return if symptoms worsen or fail to improve.    Subjective   Shelbi is a 22 year old, presenting for the following health issues:  Follow Up    HPI     Patient presents today for telephone visit about dizziness.  She had a bad episode last week, got a little better, but is still having more dizziness than she would expect.  She does think some of the exercises she was taught from PT are helping with the dizziness but she still having persistent nausea.  Overheating makes the nausea worse.  She has not had any vomiting.  Her vision is normal for her.  Dramamine has been taken daily before work and has helped some, but overall not really.  She does note that the nausea is in the morning and plateaus around 10 AM.  She  "tells me her mom and her have a history of \"acid problems.\"  She has not had any episodes of passing out.  She has felt lightheaded a few times.  No headaches.  No ringing in her ears.    Review of Systems   HENT: Negative for tinnitus.    Gastrointestinal: Positive for nausea. Negative for vomiting.   Neurological: Positive for dizziness and light-headedness. Negative for weakness, numbness, headaches and paresthesias.         Objective       Vitals:  No vitals were obtained today due to virtual visit.    Physical Exam   PSYCH: Alert and oriented times 3; coherent speech, normal   rate and volume, able to articulate logical thoughts, able   to abstract reason, no tangential thoughts, no hallucinations   or delusions  Her affect is normal  RESP: No cough, no audible wheezing, able to talk in full sentences  Remainder of exam unable to be completed due to telephone visits    Phone call duration: 7 minutes    At the end of the visit, I confirmed understanding of what was discussed. Patient has no further questions or concerns that were brought up at this time.     Ania Ramos, RYLAND, APRN, FNP-C    "

## 2023-03-16 ASSESSMENT — ENCOUNTER SYMPTOMS
WEAKNESS: 0
PARESTHESIAS: 0
NUMBNESS: 0
DIZZINESS: 1
NAUSEA: 1
LIGHT-HEADEDNESS: 1
HEADACHES: 0
VOMITING: 0

## 2023-03-23 ENCOUNTER — HOSPITAL ENCOUNTER (OUTPATIENT)
Dept: MRI IMAGING | Facility: HOSPITAL | Age: 23
Discharge: HOME | End: 2023-03-23
Payer: COMMERCIAL

## 2023-03-23 DIAGNOSIS — R11.0 NAUSEA: ICD-10-CM

## 2023-03-23 DIAGNOSIS — R42 DIZZINESS: ICD-10-CM

## 2023-03-23 PROCEDURE — 70551 MRI BRAIN STEM W/O DYE: CPT

## 2023-04-05 ENCOUNTER — HOSPITAL ENCOUNTER (OUTPATIENT)
Dept: PHYSICAL THERAPY | Facility: REHABILITATION | Age: 23
Discharge: HOME | End: 2023-04-05
Payer: COMMERCIAL

## 2023-04-05 DIAGNOSIS — R42 DIZZINESS: Primary | ICD-10-CM

## 2023-04-05 PROCEDURE — 97112 NEUROMUSCULAR REEDUCATION: CPT | Mod: GP | Performed by: PHYSICAL THERAPIST

## 2023-04-05 NOTE — PROGRESS NOTES
[facility] Service    Outpatient Physical Therapy Discharge Note  Patient: Shelbi Johnson  : 2000    Beginning/End Dates of Reporting Period:  3/8/23 to 2023     Referring Provider: Mona Hooker Diagnosis: Dizziness     Client Self Report: Pt had head MRI 3/23 and it was negative. She realized that her nauseau may be related from use of vitamins; when she changed this, she had less nausea. Lately she is feeling much less nausea (very minimal). She reports zero dizziness since she changed her vitamins; now she can eat more.    Objective Measurements:  See below    Outcome Measures (most recent score):  DHI: 0    Goals:  See below    Plan:  Discharge from therapy.    Discharge:    Reason for Discharge: Patient has met all goals.    Equipment Issued: N/A    Discharge Plan: N/A, pt does not need to continue HEP d/t resolution of symptoms       23 1500   Signing Clinician's Name / Credentials   Signing clinician's name / credentials Jaquan Roque PT   Session Number   Session Number -10   Progress Note/Recertification   Progress Note Due Date   (visit 10)   Adult Goals   PT Eval Goals 1   Goal 1   Goal Identifier Dizziness   Goal Description Pt will report no dizziness with bed mobility, when sitting, standing and walking or with work task to return to PLOF.   Goal Progress Goal met, pt reports no dizziness with any activities.   Target Date 23   Date Met 23   Subjective Report   Subjective Report Pt had head MRI 3/23 and it was negative. She realized that her nauseau may be related from use of vitamins; when she changed this, she had less nausea. Lately she is feeling much less nausea (very minimal). She reports zero dizziness since she changed her vitamins; now she can eat more.   Objective Measures   Objective Measures Objective Measure 1   Objective Measure 1   Objective  Measure VOR x 1 and x 2   Details no dizziness today   System Outcome Measures   Dizziness Handicap Inventory (score out of 100) A decrease in score by 17.18 or greater indicates a clinically significant change in symptoms. 0   Treatment Interventions   Interventions Neuromuscular Re-education   Neuromuscular Re-education   Neuromuscular re-ed of mvmt, balance, coord, kinesthetic sense, posture, proprioception minutes (69866) 15   Skilled Intervention Vestibular adaptation   Patient Response No dizziness elicited today - pt appropriate for D/C and does not need to work on HEP any longer   Treatment Detail Assessment of VOR x 1 in sitting, progressed to standing, VOR x 2 in standing, walking with VOR x 1 and walking with VOR x 2. No dizziness with ambulation and head turns, looking up/down or with tandem walking.   Cervicogenic Screen   Neck ROM WNL, no dizziness   Plan   Homework PTRx   Home program gaze stabilization - VOR x 1 in sitting   Plan for next session N/A   Comments   Comments Pt has been seen for 2 visits from 3/8/23 to present with focus on vestibular adaptation for dizziness. At this time, pt reports no dizziness after changing her vitamins at home, which eliminated her nausea and allowed her to eat foot normally again. Unable to elicit dizziness today with high level vestibular adaptation/balance activities, so HEP was removed today and pt appropriate for D/C.   Total Session Time   Timed Code Treatment Minutes 15   Total Treatment Time (sum of timed and untimed services) 15   Medicare Claim Information   Medical Diagnosis Vertigo, dizziness   PT Diagnosis Dizziness   Start of Care Date 03/08/23   Onset of illness/injury or Date of Surgery 02/01/23       Jaquan Roque, PT, DPT, CLT  4/5/2023

## 2023-04-18 DIAGNOSIS — R11.0 NAUSEA: ICD-10-CM

## 2023-04-18 NOTE — TELEPHONE ENCOUNTER
"Last Written Prescription Date:  03/15/2023  Last Fill Quantity: 30,  # refills: 0   Last office visit provider:  03/15/2023     Requested Prescriptions   Pending Prescriptions Disp Refills     omeprazole (PRILOSEC) 20 MG DR capsule 30 capsule 0     Sig: Take 1 capsule (20 mg) by mouth daily       PPI Protocol Passed - 4/19/2023 11:57 AM        Passed - Not on Clopidogrel (unless Pantoprazole ordered)        Passed - No diagnosis of osteoporosis on record        Passed - Recent (12 mo) or future (30 days) visit within the authorizing provider's specialty     Patient has had an office visit with the authorizing provider or a provider within the authorizing providers department within the previous 12 mos or has a future within next 30 days. See \"Patient Info\" tab in inbasket, or \"Choose Columns\" in Meds & Orders section of the refill encounter.              Passed - Medication is active on med list        Passed - Patient is age 18 or older        Passed - No active pregnacy on record        Passed - No positive pregnancy test in past 12 months             Lashanda Ramirez RN 04/19/23 11:58 AM                                            Pharmacy calling to get a medication refill on medications attached    "

## 2023-05-16 ENCOUNTER — OFFICE VISIT (OUTPATIENT)
Dept: FAMILY MEDICINE | Facility: CLINIC | Age: 23
End: 2023-05-16
Payer: COMMERCIAL

## 2023-05-16 VITALS
BODY MASS INDEX: 23.46 KG/M2 | HEART RATE: 84 BPM | HEIGHT: 66 IN | DIASTOLIC BLOOD PRESSURE: 62 MMHG | OXYGEN SATURATION: 96 % | WEIGHT: 146 LBS | SYSTOLIC BLOOD PRESSURE: 102 MMHG | TEMPERATURE: 97.7 F

## 2023-05-16 DIAGNOSIS — Z01.818 PREOP GENERAL PHYSICAL EXAM: Primary | ICD-10-CM

## 2023-05-16 DIAGNOSIS — S90.852D FOREIGN BODY IN LEFT FOOT, SUBSEQUENT ENCOUNTER: ICD-10-CM

## 2023-05-16 LAB — HCG UR QL: NEGATIVE

## 2023-05-16 PROCEDURE — 99213 OFFICE O/P EST LOW 20 MIN: CPT | Performed by: NURSE PRACTITIONER

## 2023-05-16 PROCEDURE — 81025 URINE PREGNANCY TEST: CPT | Performed by: NURSE PRACTITIONER

## 2023-05-16 NOTE — PATIENT INSTRUCTIONS
For informational purposes only. Not to replace the advice of your health care provider. Copyright   2003,  [facility]. All rights reserved. Clinically reviewed by Eliza Velez MD. [employer] [id number] - REV .  Preparing for Your Surgery  Getting started  A nurse will call you to review your health history and instructions. They will give you an arrival time based on your scheduled surgery time. Please be ready to share:  Your doctor's clinic name and phone number  Your medical, surgical, and anesthesia history  A list of allergies and sensitivities  A list of medicines, including herbal treatments and over-the-counter drugs  Whether the patient has a legal guardian (ask how to send us the papers in advance)  Please tell us if you're pregnant--or if there's any chance you might be pregnant. Some surgeries may injure a fetus (unborn baby), so they require a pregnancy test. Surgeries that are safe for a fetus don't always need a test, and you can choose whether to have one.   If you have a child who's having surgery, please ask for a copy of Preparing for Your Child's Surgery.    Preparing for surgery  Within 10 to 30 days of surgery: Have a pre-op exam (sometimes called an H&P, or History and Physical). This can be done at a clinic or pre-operative center.  If you're having a , you may not need this exam. Talk to your care team.  At your pre-op exam, talk to your care team about all medicines you take. If you need to stop any medicines before surgery, ask when to start taking them again.  We do this for your safety. Many medicines can make you bleed too much during surgery. Some change how well surgery (anesthesia) drugs work.  Call your insurance company to let them know you're having surgery. (If you don't have insurance, call [phone].)  Call your clinic if there's any change in your health. This includes signs of a cold or flu (sore throat, runny nose, cough, rash, fever). It  also includes a scrape or scratch near the surgery site.  If you have questions on the day of surgery, call your hospital or surgery center.  Eating and drinking guidelines  For your safety: Unless your surgeon tells you otherwise, follow the guidelines below.  Eat and drink as usual until 8 hours before you arrive for surgery. After that, no food or milk.  Drink clear liquids until 2 hours before you arrive. These are liquids you can see through, like water, Gatorade, and Propel Water. They also include plain black coffee and tea (no cream or milk), candy, and breath mints. You can spit out gum when you arrive.  If you drink alcohol: Stop drinking it the night before surgery.  If your care team tells you to take medicine on the morning of surgery, it's okay to take it with a sip of water.  Preventing infection  Shower or bathe the night before and morning of your surgery. Follow the instructions your clinic gave you. (If no instructions, use regular soap.)  Don't shave or clip hair near your surgery site. We'll remove the hair if needed.  Don't smoke or vape the morning of surgery. You may chew nicotine gum up to 2 hours before surgery. A nicotine patch is okay.  Note: Some surgeries require you to completely quit smoking and nicotine. Check with your surgeon.  Your care team will make every effort to keep you safe from infection. We will:  Clean our hands often with soap and water (or an alcohol-based hand rub).  Clean the skin at your surgery site with a special soap that kills germs.  Give you a special gown to keep you warm. (Cold raises the risk of infection.)  Wear special hair covers, masks, gowns and gloves during surgery.  Give antibiotic medicine, if prescribed. Not all surgeries need antibiotics.  What to bring on the day of surgery  Photo ID and insurance card  Copy of your health care directive, if you have one  Glasses and hearing aids (bring cases)  You can't wear contacts during surgery  Inhaler and  eye drops, if you use them (tell us about these when you arrive)  CPAP machine or breathing device, if you use them  A few personal items, if spending the night  If you have . . .  A pacemaker, ICD (cardiac defibrillator) or other implant: Bring the ID card.  An implanted stimulator: Bring the remote control.  A legal guardian: Bring a copy of the certified (court-stamped) guardianship papers.  Please remove any jewelry, including body piercings. Leave jewelry and other valuables at home.  If you're going home the day of surgery  You must have a responsible adult drive you home. They should stay with you overnight as well.  If you don't have someone to stay with you, and you aren't safe to go home alone, we may keep you overnight. Insurance often won't pay for this.  After surgery  If it's hard to control your pain or you need more pain medicine, please call your surgeon's office.  Questions?   If you have any questions for your care team, list them here: _________________________________________________________________________________________________________________________________________________________________________ ____________________________________ ____________________________________ ____________________________________    How to Take Your Medication Before Surgery  HOLD all medications the morning of surgery

## 2023-05-16 NOTE — PROGRESS NOTES
[facility]  [address] [address]  Phone: [phone]  Fax: [phone]  Primary Provider: Mona Ramos  Pre-op Performing Provider: CAMILLA SAMANIEOG      PREOPERATIVE EVALUATION:  Today's date: 5/16/2023    Shelbi Johnson is a 22 year old female who presents for a preoperative evaluation.     Surgical Information:  Surgery/Procedure: Left foot foreign body removal  Surgery Location: [facility]  Surgeon: Ute Cervantes MD  Surgery Date: 05/17/2023  Time of Surgery: 9:30 AM  Where patient plans to recover: At home with family  Fax number for surgical facility: [phone]    Assessment & Plan     The proposed surgical procedure is considered LOW risk.    Preop general physical exam  Urine pregnancy negative  - HCG qualitative urine    Foreign body in left foot, subsequent encounter            Antiplatelet or Anticoagulation Medication Instructions:   - Patient is on no antiplatelet or anticoagulation medications.    Additional Medication Instructions:  HOLD all medications the morning of surgery    RECOMMENDATION:  APPROVAL GIVEN to proceed with proposed procedure, without further diagnostic evaluation.      Subjective     HPI related to upcoming procedure:     Patient is scheduled to remove the foreign body to her left foot.  Patient stepped on something at home last evening, which caused pain and swelling to her foot.  An x-ray showed a foreign body.    History of exercise-induced asthma, which is well controlled.        5/16/2023     3:51 PM   Preop Questions   1. Have you ever had a heart attack or stroke? No   2. Have you ever had surgery on your heart or blood vessels, such as a stent placement, a coronary artery bypass, or surgery on an artery in your head, neck, heart, or legs? No   3. Do you have chest pain with activity? No   4. Do you have a history of  heart failure? No   5. Do you currently have a cold, bronchitis or symptoms of  other infection? No   6. Do you have a cough, shortness of breath, or wheezing? No   7. Do you or anyone in your family have previous history of blood clots? YES - grandfather history of blood clot after surgery   8. Do you or does anyone in your family have a serious bleeding problem such as prolonged bleeding following surgeries or cuts? No   9. Have you ever had problems with anemia or been told to take iron pills? No   10. Have you had any abnormal blood loss such as black, tarry or bloody stools, or abnormal vaginal bleeding? No   11. Have you ever had a blood transfusion? YES - at birth   11a. Have you ever had a transfusion reaction? No   12. Are you willing to have a blood transfusion if it is medically needed before, during, or after your surgery? Yes   13. Have you or any of your relatives ever had problems with anesthesia? YES - maternal grandmother, mother - hypotension   14. Do you have sleep apnea, excessive snoring or daytime drowsiness? No   15. Do you have any artifical heart valves or other implanted medical devices like a pacemaker, defibrillator, or continuous glucose monitor? No   16. Do you have artificial joints? No   17. Are you allergic to latex? No   18. Is there any chance that you may be pregnant? No       Health Care Directive:  Patient does not have a Health Care Directive or Living Will: Discussed advance care planning with patient; however, patient declined at this time.    Preoperative Review of :   reviewed - no record of controlled substances prescribed.      Review of Systems  CONSTITUTIONAL: NEGATIVE for fever, chills, change in weight  INTEGUMENTARY/SKIN: NEGATIVE for worrisome rashes, moles or lesions  EYES: NEGATIVE for vision changes or irritation  ENT/MOUTH: NEGATIVE for ear, mouth and throat problems  RESP: NEGATIVE for significant cough or SOB  CV: NEGATIVE for chest pain, palpitations or peripheral edema  GI: NEGATIVE for nausea, abdominal pain, heartburn, or change  in bowel habits  : NEGATIVE for frequency, dysuria, or hematuria  MUSCULOSKELETAL: NEGATIVE for significant arthralgias or myalgia  NEURO: NEGATIVE for weakness, dizziness or paresthesias  ENDOCRINE: NEGATIVE for temperature intolerance, skin/hair changes  HEME: NEGATIVE for bleeding problems  PSYCHIATRIC: NEGATIVE for changes in mood or affect    Patient Active Problem List    Diagnosis Date Noted     Dizziness 2023     Priority: Medium     Moderate episode of recurrent major depressive disorder (H) 2022     Priority: Medium     Generalized anxiety disorder 2022     Priority: Medium     Generalized muscle weakness 2015     Priority: Medium     Chronic kidney disease, stage II (mild) 2012     Priority: Medium      Past Medical History:   Diagnosis Date     CKD (chronic kidney disease), stage II     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     Fracture 2019    wrist     Term birth of female      38 weeks gestation, BW 7lbs 5oz, complicated by placental abruption and urgent , hypovolemia due to maternal fetal hemorrage     Past Surgical History:   Procedure Laterality Date     KNEE ARTHROSCOPY W/ PLICA EXCISION Right 11/15/2013    Right lateral release and removal of plica, [facility], [place], [place]     TONSILLECTOMY  2017    For recurrent tonsillitis     TONSILLECTOMY Bilateral 2017     Current Outpatient Medications   Medication Sig Dispense Refill     Albuterol Sulfate (PROAIR HFA IN) Inhale into the lungs as needed       Ascorbic Acid (VITAMIN C PO)        CALCIUM PO        Cholecalciferol (VITAMIN D3 PO) Take by mouth daily       FIBER PO        levonorgestrel (MIRENA) 20 MCG/24HR IUD 1 each by Intrauterine route       Multiple Vitamin (MULTIVITAMIN PO)        omeprazole (PRILOSEC) 20 MG DR capsule Take 1 capsule (20 mg) by mouth daily (Patient not taking: Reported on 2023) 30 capsule 0     ondansetron (ZOFRAN ODT) 4  "MG ODT tab Take 1 tablet (4 mg) by mouth every 8 hours as needed for nausea (Patient not taking: Reported on 5/16/2023) 20 tablet 0       Allergies   Allergen Reactions     Nsaids Other (See Comments)     Pt has kidney concerns, cannot take NSAIDs per pt  Pt has kidney concerns, cannot take NSAIDs per pt        Social History     Tobacco Use     Smoking status: Never     Passive exposure: Never     Smokeless tobacco: Never   Vaping Use     Vaping status: Not on file   Substance Use Topics     Alcohol use: No     Family History   Problem Relation Age of Onset     Kidney Disease No family hx of      Depression Mother      Hyperlipidemia Mother      Osteoarthritis Mother      Other - See Comments Mother         Spinal stenosis     No Known Problems Father      No Known Problems Brother      Other - See Comments Maternal Grandmother         Spinal stenosis     Paget's disease of bone Maternal Grandmother      Cardiomyopathy Maternal Grandmother         viral     Colon Cancer Maternal Grandmother         Unsure if mass was cancerous or not      Alzheimer Disease Maternal Grandfather      Hypertension Maternal Grandfather      Hyperlipidemia Maternal Grandfather      Transient ischemic attack Maternal Grandfather      Breast Cancer Paternal Grandmother      Cerebrovascular Disease Paternal Grandfather      History   Drug Use No         Objective     /62 (BP Location: Left arm, Patient Position: Sitting, Cuff Size: Adult Regular)   Pulse 84   Temp 97.7  F (36.5  C) (Temporal)   Ht 1.67 m (5' 5.75\")   Wt 66.2 kg (146 lb)   SpO2 96%   BMI 23.74 kg/m      Physical Exam    GENERAL APPEARANCE: healthy, alert and no distress     EYES: EOMI, PERRL     HENT: ear canals and TM's normal and nose and mouth without ulcers or lesions     NECK: no adenopathy, no asymmetry, masses, or scars and thyroid normal to palpation     RESP: lungs clear to auscultation - no rales, rhonchi or wheezes     CV: regular rates and rhythm, " normal S1 S2, no S3 or S4 and no murmur, click or rub     ABDOMEN:  soft, nontender, no HSM or masses and bowel sounds normal     MS: extremities normal- no gross deformities noted, no evidence of inflammation in joints, FROM in all extremities.     SKIN: no suspicious lesions or rashes     NEURO: Normal strength and tone, sensory exam grossly normal, mentation intact and speech normal     PSYCH: mentation appears normal. and affect normal/bright     LYMPHATICS: No cervical adenopathy    Recent Labs   Lab Test 02/22/23  0942 12/20/22  0717 11/04/22  0747 10/01/21  1323   HGB 15.5  --   --  14.1     --   --  188    140   < >  --    POTASSIUM 4.9 4.6   < >  --    CR 1.10* 1.24*   < >  --    A1C 5.3  --   --   --     < > = values in this interval not displayed.        Diagnostics:  Recent Results (from the past 240 hour(s))   HCG qualitative urine    Collection Time: 05/16/23  4:22 PM   Result Value Ref Range    hCG Urine Qualitative Negative Negative         No EKG required, no history of coronary heart disease, significant arrhythmia, peripheral arterial disease or other structural heart disease.    Revised Cardiac Risk Index (RCRI):  The patient has the following serious cardiovascular risks for perioperative complications:   - No serious cardiac risks = 0 points     RCRI Interpretation: 0 points: Class I (very low risk - 0.4% complication rate)           Signed Electronically by: Isadora Osborne NP  Copy of this evaluation report is provided to requesting physician.         no

## 2023-11-07 ENCOUNTER — OFFICE VISIT (OUTPATIENT)
Dept: FAMILY MEDICINE | Facility: CLINIC | Age: 23
End: 2023-11-07
Payer: COMMERCIAL

## 2023-11-07 VITALS
DIASTOLIC BLOOD PRESSURE: 70 MMHG | OXYGEN SATURATION: 98 % | HEART RATE: 75 BPM | BODY MASS INDEX: 23.78 KG/M2 | WEIGHT: 148 LBS | TEMPERATURE: 98.7 F | RESPIRATION RATE: 16 BRPM | SYSTOLIC BLOOD PRESSURE: 106 MMHG | HEIGHT: 66 IN

## 2023-11-07 DIAGNOSIS — Z11.3 SCREENING FOR STDS (SEXUALLY TRANSMITTED DISEASES): ICD-10-CM

## 2023-11-07 DIAGNOSIS — N18.2 CHRONIC KIDNEY DISEASE, STAGE II (MILD): ICD-10-CM

## 2023-11-07 DIAGNOSIS — F41.1 GENERALIZED ANXIETY DISORDER: ICD-10-CM

## 2023-11-07 DIAGNOSIS — F33.1 MAJOR DEPRESSIVE DISORDER, RECURRENT EPISODE, MODERATE (H): Primary | ICD-10-CM

## 2023-11-07 PROCEDURE — 91320 SARSCV2 VAC 30MCG TRS-SUC IM: CPT | Performed by: FAMILY MEDICINE

## 2023-11-07 PROCEDURE — 90471 IMMUNIZATION ADMIN: CPT | Performed by: FAMILY MEDICINE

## 2023-11-07 PROCEDURE — 90686 IIV4 VACC NO PRSV 0.5 ML IM: CPT | Performed by: FAMILY MEDICINE

## 2023-11-07 PROCEDURE — 96127 BRIEF EMOTIONAL/BEHAV ASSMT: CPT | Performed by: FAMILY MEDICINE

## 2023-11-07 PROCEDURE — 99214 OFFICE O/P EST MOD 30 MIN: CPT | Mod: 25 | Performed by: FAMILY MEDICINE

## 2023-11-07 PROCEDURE — 87591 N.GONORRHOEAE DNA AMP PROB: CPT | Performed by: FAMILY MEDICINE

## 2023-11-07 PROCEDURE — 90480 ADMN SARSCOV2 VAC 1/ONLY CMP: CPT | Performed by: FAMILY MEDICINE

## 2023-11-07 PROCEDURE — 87491 CHLMYD TRACH DNA AMP PROBE: CPT | Performed by: FAMILY MEDICINE

## 2023-11-07 ASSESSMENT — ANXIETY QUESTIONNAIRES
GAD7 TOTAL SCORE: 19
GAD7 TOTAL SCORE: 19
IF YOU CHECKED OFF ANY PROBLEMS ON THIS QUESTIONNAIRE, HOW DIFFICULT HAVE THESE PROBLEMS MADE IT FOR YOU TO DO YOUR WORK, TAKE CARE OF THINGS AT HOME, OR GET ALONG WITH OTHER PEOPLE: SOMEWHAT DIFFICULT
6. BECOMING EASILY ANNOYED OR IRRITABLE: NEARLY EVERY DAY
7. FEELING AFRAID AS IF SOMETHING AWFUL MIGHT HAPPEN: MORE THAN HALF THE DAYS
5. BEING SO RESTLESS THAT IT IS HARD TO SIT STILL: MORE THAN HALF THE DAYS
2. NOT BEING ABLE TO STOP OR CONTROL WORRYING: NEARLY EVERY DAY
3. WORRYING TOO MUCH ABOUT DIFFERENT THINGS: NEARLY EVERY DAY
1. FEELING NERVOUS, ANXIOUS, OR ON EDGE: NEARLY EVERY DAY

## 2023-11-07 ASSESSMENT — PATIENT HEALTH QUESTIONNAIRE - PHQ9
5. POOR APPETITE OR OVEREATING: NEARLY EVERY DAY
SUM OF ALL RESPONSES TO PHQ QUESTIONS 1-9: 14

## 2023-11-07 NOTE — PROGRESS NOTES
Assessment & Plan     Major depressive disorder, recurrent episode, moderate (H)  Patient will resume her medication slowly ramping up to her previous dose.  We will get her set up with counseling.  If she needs to alter the medications she will set up a follow-up visit with one of my partners.  - FLUoxetine (PROZAC) 20 MG capsule; Take 1 tablet/day for 4 weeks and 2 tablets a day for 4 weeks then 3 tablets a day.    Generalized anxiety disorder  As above    Chronic kidney disease, stage II (mild)  No longer an issue    Screening for STDs (sexually transmitted diseases)  No symptoms we will get screening  - NEISSERIA GONORRHOEA PCR; Future  - CHLAMYDIA TRACHOMATIS PCR      25 minutes spent by me on the date of the encounter doing chart review, history and exam, documentation and further activities per the note       Depression Screening Follow Up        11/7/2023     3:42 PM   PHQ   PHQ-9 Total Score 14   Q9: Thoughts of better off dead/self-harm past 2 weeks More than half the days                 Follow Up    Follow Up Actions Taken      Discussed the following ways the patient can remain in a safe environment:        Margarito Aviles MD  [facility]    Sushila Jones is a 23 year old, presenting for the following health issues: Patient is a [employer] grad.  She is in accounting and finance.  She has a job with a permanent [employer].  Her boyfriend is a student here.  She has a history of depression but has been off meds for 3 years.  There have been several recent events so that have triggered her.  One of her boyfriends friends committed suicide another friend was assaulted and has been 3 suicides in the last month on campus.  She denies intent to hurt herself.  Depression (Hx of Prozac 60mg and would like to go back on. )      11/7/2023     3:36 PM   Additional Questions   Roomed by Gissel SAUCEDO       Abnormal Mood Symptoms  Onset/Duration: beginning of  "Oct  Description:   Depression (if yes, do PHQ-9): YES  Anxiety (if yes, do NEY-7): YES  Accompanying Signs & Symptoms:  Still participating in activities that you used to enjoy: Sometimes  Fatigue: YES  Irritability: YES  Difficulty concentrating: YES  Changes in appetite: YES  Problems with sleep: No  Heart racing/beating fast: No  Abnormally elevated, expansive, or irritable mood: YES  Persistently increased activity or energy: No  Thoughts of hurting yourself or others: YES  History:  Recent stress or major life event: YES  Prior depression or anxiety: yes  Family history of depression or anxiety: YES- maternal side  Alcohol/drug use: No  Difficulty sleeping: No  Precipitating or alleviating factors: reading or coloring  Therapies tried and outcome: individual therapy and medication(s) Prozac 60mg(fluoxetine)      10/11/2022    10:49 AM 3/15/2023     4:09 PM 11/5/2023     6:39 PM   PHQ   PHQ-9 Total Score 3 5 14   Q9: Thoughts of better off dead/self-harm past 2 weeks Not at all Not at all Several days   F/U: Thoughts of suicide or self-harm   Yes   F/U: Self harm-plan   No   F/U: Self-harm action   No   F/U: Safety concerns   No         10/11/2022    10:49 AM 3/15/2023     4:11 PM   NEY-7 SCORE   Total Score 1 10           Review of Systems   Constitutional, HEENT, cardiovascular, pulmonary, gi and gu systems are negative, except as otherwise noted.      Objective    /70 (BP Location: Right arm, Patient Position: Sitting, Cuff Size: Adult Large)   Pulse 75   Temp 98.7  F (37.1  C) (Tympanic)   Resp 16   Ht 1.67 m (5' 5.75\")   Wt 67.1 kg (148 lb)   LMP  (LMP Unknown)   SpO2 98%   BMI 24.07 kg/m    Body mass index is 24.07 kg/m .  Physical Exam   Patient is alert pleasant nonsuicidal                      "

## 2023-11-07 NOTE — LETTER
November 8, 2023      Karenleeroy Johnson  [address]  [place] [address]        Dear ,    We are writing to inform you of your test results.    Your test results fall within the expected range(s) or remain unchanged from previous results.  Please continue with current treatment plan.    Resulted Orders   CHLAMYDIA TRACHOMATIS PCR   Result Value Ref Range    Chlamydia trachomatis Negative Negative      Comment:      A negative result by transcription mediated amplification does not preclude the presence of C. trachomatis infection because results are dependent on proper and adequate collection, absence of inhibitors and sufficient rRNA to be detected.   NEISSERIA GONORRHOEA PCR   Result Value Ref Range    Neisseria gonorrhoeae Negative Negative      Comment:      Negative for N. gonorrhoeae rRNA by transcription mediated amplification. A negative result by transcription mediated amplification does not preclude the presence of C. trachomatis infection because results are dependent on proper and adequate collection, absence of inhibitors and sufficient rRNA to be detected.       If you have any questions or concerns, please call the clinic at the number listed above.       Sincerely,      Margarito Aviles MD

## 2023-11-08 LAB
C TRACH DNA SPEC QL NAA+PROBE: NEGATIVE
N GONORRHOEA DNA SPEC QL NAA+PROBE: NEGATIVE

## 2023-11-20 ENCOUNTER — TELEPHONE (OUTPATIENT)
Dept: BEHAVIORAL HEALTH | Facility: CLINIC | Age: 23
End: 2023-11-20
Payer: COMMERCIAL

## 2023-11-20 NOTE — TELEPHONE ENCOUNTER
C received a referral from pt's PCP.  My Chart message and a voice message left for pt with information how to reach out to Behavioral Health Intake.    RIDDHI Diaz on 11/20/2023 at 3:34 PM

## 2023-12-06 ENCOUNTER — VIRTUAL VISIT (OUTPATIENT)
Dept: BEHAVIORAL HEALTH | Facility: CLINIC | Age: 23
End: 2023-12-06
Payer: COMMERCIAL

## 2023-12-06 DIAGNOSIS — F33.1 MODERATE EPISODE OF RECURRENT MAJOR DEPRESSIVE DISORDER (H): Primary | ICD-10-CM

## 2023-12-06 DIAGNOSIS — F41.1 GENERALIZED ANXIETY DISORDER: ICD-10-CM

## 2023-12-06 PROCEDURE — 90834 PSYTX W PT 45 MINUTES: CPT | Mod: VID | Performed by: SOCIAL WORKER

## 2023-12-06 ASSESSMENT — COLUMBIA-SUICIDE SEVERITY RATING SCALE - C-SSRS
4. HAVE YOU HAD THESE THOUGHTS AND HAD SOME INTENTION OF ACTING ON THEM?: YES
ATTEMPT LIFETIME: NO
3. HAVE YOU BEEN THINKING ABOUT HOW YOU MIGHT KILL YOURSELF?: YES
TOTAL  NUMBER OF ABORTED OR SELF INTERRUPTED ATTEMPTS LIFETIME: NO
1. HAVE YOU WISHED YOU WERE DEAD OR WISHED YOU COULD GO TO SLEEP AND NOT WAKE UP?: YES
1. IN THE PAST MONTH, HAVE YOU WISHED YOU WERE DEAD OR WISHED YOU COULD GO TO SLEEP AND NOT WAKE UP?: NO
2. HAVE YOU ACTUALLY HAD ANY THOUGHTS OF KILLING YOURSELF?: YES
4. HAVE YOU HAD THESE THOUGHTS AND HAD SOME INTENTION OF ACTING ON THEM?: NO
TOTAL  NUMBER OF INTERRUPTED ATTEMPTS PAST 3 MONTHS: YES
TOTAL  NUMBER OF INTERRUPTED ATTEMPTS LIFETIME: NO
6. HAVE YOU EVER DONE ANYTHING, STARTED TO DO ANYTHING, OR PREPARED TO DO ANYTHING TO END YOUR LIFE?: NO
TOTAL  NUMBER OF INTERRUPTED ATTEMPTS PAST 3 MONTHS: 1
5. HAVE YOU STARTED TO WORK OUT OR WORKED OUT THE DETAILS OF HOW TO KILL YOURSELF? DO YOU INTEND TO CARRY OUT THIS PLAN?: NO
REASONS FOR IDEATION LIFETIME: COMPLETELY TO END OR STOP THE PAIN (YOU COULDN'T GO ON LIVING WITH THE PAIN OR HOW YOU WERE FEELING)
2. HAVE YOU ACTUALLY HAD ANY THOUGHTS OF KILLING YOURSELF?: NO

## 2023-12-06 ASSESSMENT — PATIENT HEALTH QUESTIONNAIRE - PHQ9
SUM OF ALL RESPONSES TO PHQ QUESTIONS 1-9: 3
10. IF YOU CHECKED OFF ANY PROBLEMS, HOW DIFFICULT HAVE THESE PROBLEMS MADE IT FOR YOU TO DO YOUR WORK, TAKE CARE OF THINGS AT HOME, OR GET ALONG WITH OTHER PEOPLE: NOT DIFFICULT AT ALL
SUM OF ALL RESPONSES TO PHQ QUESTIONS 1-9: 3

## 2023-12-06 NOTE — PROGRESS NOTES
[facility] Primary Care: Integrated Behavioral Health  2023      Behavioral Health Clinician Progress Note    Patient Name: Shelbi Johnson           Service Type:  Individual      Service Location:   MyChart / Email (patient reached)     Session Start Time: 3:00p  Session End Time: 3:50p      Session Length: 38 - 52      Attendees: Client     Service Modality:  Video Visit:      Provider verified identity through the following two step process.  Patient provided:  Patient  and Patient address    Telemedicine Visit: The patient's condition can be safely assessed and treated via synchronous audio and visual telemedicine encounter.      Reason for Telemedicine Visit: Patient has requested telehealth visit    Originating Site (Patient Location): Patient's home    Distant Site (Provider Location): [facility]    Consent:  The patient/guardian has verbally consented to: the potential risks and benefits of telemedicine (video visit) versus in person care; bill my insurance or make self-payment for services provided; and responsibility for payment of non-covered services.     Patient would like the video invitation sent by:  My Chart    Mode of Communication:  Video Conference via [facility]    Distant Location (Provider):  On-site    As the provider I attest to compliance with applicable laws and regulations related to telemedicine.    Visit Activities (Refresh list every visit): [facility] Only    Diagnostic Assessment Date: to be completed by 3rd [facility] visit  Treatment Plan Review Date: to be completed after DA  See Flowsheets for today's PHQ-9 and NEY-7 results  Previous PHQ-9:       2023     6:39 PM 2023     3:42 PM 2023     2:29 PM   PHQ-9 SCORE   PHQ-9 Total Score Ruddy 14 (Moderate depression)  3 (Minimal depression)   PHQ-9 Total Score 14 14 3     Previous NEY-7:       10/11/2022    10:49 AM 3/15/2023     4:11 PM 2023     3:42 PM    NEY-7 SCORE   Total Score 1 10 19       TAMMY LEVEL:       No data to display                DATA  Extended Session (60+ minutes): No  Interactive Complexity: No  Crisis: No  [facility] Patient: No    Treatment Objective(s) Addressed in This Session:  Target Behavior(s): disease management/lifestyle changes recent increase in mood symptoms    Depressed Mood: Decrease frequency and intensity of feeling down, depressed, hopeless  Anxiety: will experience a reduction in anxiety and will develop more effective coping skills to manage anxiety symptoms    Current Stressors / Issues:  This is pt's first meeting with [facility].  Pt shares that last month she met with a [facility] PCP for the first time and due to increased stressors did decide to resume prozac.  Pt reports that over the past several years she has been on and off prozac and most recently felt was doing well and had been off for about 3 years.  Pt reports that she graduated from [employer] last Spring and over the past several months has been experiencing some stressful times.  Has a full time job in [place] and reports that work is going well.  When first started there did have some stress as an ex-boyfriend was stalking her and she had to get a restraining order.  Pt reports that the stalking has stopped now.  Pt shares that she is in a relationship now with someone that she dated a couple of years ago.  Pt shares that her relationship is going well however shares that a couple of months ago they went through a rough patch though shares it is good now.  Pt shares that just a couple of months ago a friend of her boyfriends committed suicide which was hard on her boyfriend to grasp.    Pt reports hx of medication management and hx of therapy.  Has noticed an improvement since restarting medications and would like to stay on the lower dose.  Pt shares that she lives alone and that her boyfriend is very supportive as are some friends.  Pt shares that her parents recently moved  from [place] to [place].  She shares that her mother is supportive but that her father isn't supportive especially regarding her relationship with her boyfriends due to boyfriend being disabled.    Pt shares that for fun she enjoys reading, playing video games, designing a home on the computer, and traveling.  Shares that her and boyfriend plan to travel to [place] in May for her boyfriends choir trip.    Pt is open to short term therapy at this time.  Pt denies any concerns with substances and denies any current SI.      Progress on Treatment Objective(s) / Homework:  Initial visit    Motivational Interviewing    MI Intervention: Expressed Empathy/Understanding, Supported Autonomy, Collaboration, Evocation, Permission to raise concern or advise, Open-ended questions, and Reflections: simple and complex     Change Talk Expressed by the Patient: Desire to change Committment to change    Provider Response to Change Talk: E - Evoked more info from patient about behavior change, A - Affirmed patient's thoughts, decisions, or attempts at behavior change, R - Reflected patient's change talk, and S - Summarized patient's change talk statements    Also provided psychoeducation about behavioral health condition, symptoms, and treatment options    Cognitive-Behavioral Therapy and ACT    Assessments completed prior to visit:  The following assessments were completed by patient for this visit:  PHQ9:       10/4/2022     6:58 AM 10/11/2022     9:44 AM 10/11/2022    10:49 AM 3/15/2023     4:09 PM 11/5/2023     6:39 PM 11/7/2023     3:42 PM 12/6/2023     2:29 PM   PHQ-9 SCORE   PHQ-9 Total Score MyChart  3 (Minimal depression)   14 (Moderate depression)  3 (Minimal depression)   PHQ-9 Total Score 6 3 3 5 14 14 3     GAD7:       10/11/2022    10:49 AM 3/15/2023     4:11 PM 11/7/2023     3:42 PM   NEY-7 SCORE   Total Score 1 10 19     CAGE-AID:       12/4/2023     1:53 PM   CAGE-AID Total Score   Total Score 1   Total Score MyChart 1 (A total  score of 2 or greater is considered clinically significant)     PROMIS 10-Global Health (only subscores and total score):       12/4/2023     1:53 PM   PROMIS-10 Scores Only   Global Mental Health Score 12   Global Physical Health Score 15   PROMIS TOTAL - SUBSCORES 27     [place] Suicide Severity Rating Scale (Lifetime/Recent)      12/6/2023     3:37 PM   [place] Suicide Severity Rating (Lifetime/Recent)   Q1 Wish to be Dead (Lifetime) Y   1. Wish to be Dead (Past 1 Month) N   Q2 Non-Specific Active Suicidal Thoughts (Lifetime) Y   2. Non-Specific Active Suicidal Thoughts (Past 1 Month) N   3. Active Suicidal Ideation with any Methods (Not Plan) Without Intent to Act (Lifetime) Y   Q3 Active Suicidal Ideation with any Methods (Not Plan) Without Intent to Act (Past 1 Month) N   Q4 Active Suicidal Ideation with Some Intent to Act, Without Specific Plan (Lifetime) Y   4. Active Suicidal Ideation with Some Intent to Act, Without Specific Plan (Past 1 Month) N   Q5 Active Suicidal Ideation with Specific Plan and Intent (Lifetime) N   Most Severe Ideation Rating (Lifetime) 3   Frequency (Lifetime) 3   Duration (Lifetime) 3   Controllability (Lifetime) 3   Deterrents (Lifetime) 1   Reasons for Ideation (Lifetime) 5   Actual Attempt (Lifetime) N   Has subject engaged in non-suicidal self-injurious behavior? (Lifetime) Y   Has subject engaged in non-suicidal self-injurious behavior? (Past 3 Months) N   Interrupted Attempts (Lifetime) N   Interrupted Attempts (Past 3 Months) Y   Total Number of Interrupted Attempts (Past 3 Months) 1   Interrupted Attempt Description (Past 3 Months) Took 2 melatonin   Aborted or Self-Interrupted Attempt (Lifetime) N   Preparatory Acts or Behavior (Lifetime) N   Calculated C-SSRS Risk Score (Lifetime/Recent) High Risk       Care Plan review completed: No    Medication Review:  Changes to psychiatric medications, see updated Medication List in EPIC.     Medication Compliance:  Yes    Changes  in Health Issues:   None reported    Chemical Use Review:   Substance Use: Chemical use reviewed, no active concerns identified      Tobacco Use: No current tobacco use.      Assessment: Current Emotional / Mental Status (status of significant symptoms):  Risk status (Self / Other harm or suicidal ideation)  Patient has had a history of suicidal ideation: past SI.  Denies any current and self-injurious behavior: hx of SIB in middle school  Patient denies current fears or concerns for personal safety.  Patient denies current or recent suicidal ideation or behaviors.  Patient denies current or recent homicidal ideation or behaviors.  Patient denies current or recent self injurious behavior or ideation.  Patient denies other safety concerns.  A safety and risk management plan has not been developed at this time, however patient was encouraged to call [place] [phone] should there be a change in any of these risk factors.  Verbally discussed safety planning.    Appearance:   Appropriate   Eye Contact:   Good   Psychomotor Behavior: Normal   Attitude:   Cooperative   Orientation:   All  Speech   Rate / Production: Normal    Volume:  Normal   Mood:    Normal  Affect:    Appropriate   Thought Content:  Clear   Thought Form:  Coherent  Logical   Insight:    Good     Diagnoses:  1. Moderate episode of recurrent major depressive disorder (H)    2. Generalized anxiety disorder        Collateral Reports Completed:  Not Applicable    Plan: (Homework, other):  Patient was given information about behavioral services and encouraged to schedule a follow up appointment with the clinic [facility] in 2 weeks.  She was also given information about mental health symptoms and treatment options .  CD Recommendations: No indications of CD issues.       Migdalia Valiente, RIDDHI, LICSW, [facility]   December 6, 2023

## 2023-12-19 ENCOUNTER — VIRTUAL VISIT (OUTPATIENT)
Dept: BEHAVIORAL HEALTH | Facility: CLINIC | Age: 23
End: 2023-12-19
Payer: COMMERCIAL

## 2023-12-19 DIAGNOSIS — F41.1 GENERALIZED ANXIETY DISORDER: ICD-10-CM

## 2023-12-19 DIAGNOSIS — F33.1 MODERATE EPISODE OF RECURRENT MAJOR DEPRESSIVE DISORDER (H): Primary | ICD-10-CM

## 2023-12-19 PROCEDURE — 90834 PSYTX W PT 45 MINUTES: CPT | Mod: VID | Performed by: SOCIAL WORKER

## 2023-12-19 NOTE — PROGRESS NOTES
[facility] Primary Care: Integrated Behavioral Health  2023      Behavioral Health Clinician Progress Note    Patient Name: Shelbi Johnson           Service Type:  Individual      Service Location:   MyChart / Email (patient reached)     Session Start Time: 3:00p  Session End Time: 3:47p      Session Length: 38 - 52      Attendees: Client     Service Modality:  Video Visit:      Provider verified identity through the following two step process.  Patient provided:  Patient  and Patient address    Telemedicine Visit: The patient's condition can be safely assessed and treated via synchronous audio and visual telemedicine encounter.      Reason for Telemedicine Visit: Patient has requested telehealth visit    Originating Site (Patient Location): Patient's home    Distant Site (Provider Location): [facility]    Consent:  The patient/guardian has verbally consented to: the potential risks and benefits of telemedicine (video visit) versus in person care; bill my insurance or make self-payment for services provided; and responsibility for payment of non-covered services.     Patient would like the video invitation sent by:  My Chart    Mode of Communication:  Video Conference via [facility]    Distant Location (Provider):  On-site    As the provider I attest to compliance with applicable laws and regulations related to telemedicine.    Visit Activities (Refresh list every visit): [facility] Only    Diagnostic Assessment Date: to be completed by 3rd [facility] visit  Treatment Plan Review Date: to be completed after DA  See Flowsheets for today's PHQ-9 and NEY-7 results  Previous PHQ-9:       2023     6:39 PM 2023     3:42 PM 2023     2:29 PM   PHQ-9 SCORE   PHQ-9 Total Score Ruddy 14 (Moderate depression)  3 (Minimal depression)   PHQ-9 Total Score 14 14 3     Previous NEY-7:       10/11/2022    10:49 AM 3/15/2023     4:11 PM 2023     3:42 PM    NEY-7 SCORE   Total Score 1 10 19       TAMMY LEVEL:       No data to display                DATA  Extended Session (60+ minutes): No  Interactive Complexity: No  Crisis: No  H Patient: No    Treatment Objective(s) Addressed in This Session:  Target Behavior(s): disease management/lifestyle changes recent increase in mood symptoms    Depressed Mood: Decrease frequency and intensity of feeling down, depressed, hopeless  Anxiety: will experience a reduction in anxiety and will develop more effective coping skills to manage anxiety symptoms    Current Stressors / Issues:  Pt shares that the past couple of weeks have been fine.  Is spending time over [place] with her boyfriend and his family.  Is then flying to [place] to see grandmother and her parents and brother/MONICA will be there as well.  Has some co-workers leaving and wondering how they will fill positions.  Is feeling a bit more tired than usual as last week was stressful with fundraising for her boyfriend choir trip and a club dinner she cooked for.    Pt shares that she is excited about their upcoming trip to [facility] and that they plan on spending another week in [place].    Pt reports that she has been connecting and getting support from her mother and plans to keep her relationship with her father more casual and not get deep into topics with him.    Pt denies any negative thinking or passive SI the past several weeks.      Progress on Treatment Objective(s) / Homework:  Initial visit    Motivational Interviewing    MI Intervention: Expressed Empathy/Understanding, Supported Autonomy, Collaboration, Evocation, Permission to raise concern or advise, Open-ended questions, and Reflections: simple and complex     Change Talk Expressed by the Patient: Desire to change Committment to change    Provider Response to Change Talk: E - Evoked more info from patient about behavior change, A - Affirmed patient's thoughts, decisions, or attempts at behavior change, R -  Reflected patient's change talk, and S - Summarized patient's change talk statements    Also provided psychoeducation about behavioral health condition, symptoms, and treatment options    Cognitive-Behavioral Therapy and ACT    Assessments completed prior to visit:  The following assessments were completed by patient for this visit:  PHQ9:       10/4/2022     6:58 AM 10/11/2022     9:44 AM 10/11/2022    10:49 AM 3/15/2023     4:09 PM 11/5/2023     6:39 PM 11/7/2023     3:42 PM 12/6/2023     2:29 PM   PHQ-9 SCORE   PHQ-9 Total Score MyChart  3 (Minimal depression)   14 (Moderate depression)  3 (Minimal depression)   PHQ-9 Total Score 6 3 3 5 14 14 3     GAD7:       10/11/2022    10:49 AM 3/15/2023     4:11 PM 11/7/2023     3:42 PM   NEY-7 SCORE   Total Score 1 10 19     CAGE-AID:       12/4/2023     1:53 PM   CAGE-AID Total Score   Total Score 1   Total Score MyChart 1 (A total score of 2 or greater is considered clinically significant)     PROMIS 10-Global Health (only subscores and total score):       12/4/2023     1:53 PM   PROMIS-10 Scores Only   Global Mental Health Score 12   Global Physical Health Score 15   PROMIS TOTAL - SUBSCORES 27     [place] Suicide Severity Rating Scale (Lifetime/Recent)      12/6/2023     3:37 PM   [place] Suicide Severity Rating (Lifetime/Recent)   Q1 Wish to be Dead (Lifetime) Y   1. Wish to be Dead (Past 1 Month) N   Q2 Non-Specific Active Suicidal Thoughts (Lifetime) Y   2. Non-Specific Active Suicidal Thoughts (Past 1 Month) N   3. Active Suicidal Ideation with any Methods (Not Plan) Without Intent to Act (Lifetime) Y   Q3 Active Suicidal Ideation with any Methods (Not Plan) Without Intent to Act (Past 1 Month) N   Q4 Active Suicidal Ideation with Some Intent to Act, Without Specific Plan (Lifetime) Y   4. Active Suicidal Ideation with Some Intent to Act, Without Specific Plan (Past 1 Month) N   Q5 Active Suicidal Ideation with Specific Plan and Intent (Lifetime) N   Most Severe  Ideation Rating (Lifetime) 3   Frequency (Lifetime) 3   Duration (Lifetime) 3   Controllability (Lifetime) 3   Deterrents (Lifetime) 1   Reasons for Ideation (Lifetime) 5   Actual Attempt (Lifetime) N   Has subject engaged in non-suicidal self-injurious behavior? (Lifetime) Y   Has subject engaged in non-suicidal self-injurious behavior? (Past 3 Months) N   Interrupted Attempts (Lifetime) N   Interrupted Attempts (Past 3 Months) Y   Total Number of Interrupted Attempts (Past 3 Months) 1   Interrupted Attempt Description (Past 3 Months) Took 2 melatonin   Aborted or Self-Interrupted Attempt (Lifetime) N   Preparatory Acts or Behavior (Lifetime) N   Calculated C-SSRS Risk Score (Lifetime/Recent) High Risk       Care Plan review completed: No    Medication Review:  Changes to psychiatric medications, see updated Medication List in EPIC.     Medication Compliance:  Yes    Changes in Health Issues:   None reported    Chemical Use Review:   Substance Use: Chemical use reviewed, no active concerns identified      Tobacco Use: No current tobacco use.      Assessment: Current Emotional / Mental Status (status of significant symptoms):  Risk status (Self / Other harm or suicidal ideation)  Patient has had a history of suicidal ideation: past SI.  Denies any current and self-injurious behavior: hx of SIB in middle school  Patient denies current fears or concerns for personal safety.  Patient denies current or recent suicidal ideation or behaviors.  Patient denies current or recent homicidal ideation or behaviors.  Patient denies current or recent self injurious behavior or ideation.  Patient denies other safety concerns.  A safety and risk management plan has not been developed at this time, however patient was encouraged to call [facility] should there be a change in any of these risk factors.  Verbally discussed safety planning.    Appearance:   Appropriate   Eye Contact:   Good   Psychomotor Behavior: Normal    Attitude:   Cooperative   Orientation:   All  Speech   Rate / Production: Normal    Volume:  Normal   Mood:    Normal  Affect:    Appropriate   Thought Content:  Clear   Thought Form:  Coherent  Logical   Insight:    Good     Diagnoses:  1. Moderate episode of recurrent major depressive disorder (H)    2. Generalized anxiety disorder          Collateral Reports Completed:  Not Applicable    Plan: (Homework, other):  Patient was given information about behavioral services and encouraged to schedule a follow up appointment with the clinic [facility] in 3 weeks.  She was also given information about mental health symptoms and treatment options .  CD Recommendations: No indications of CD issues.       Migdalia Valiente, RIDDHI, LICSW, [facility]   December 19, 2023

## 2024-01-11 ENCOUNTER — VIRTUAL VISIT (OUTPATIENT)
Dept: BEHAVIORAL HEALTH | Facility: CLINIC | Age: 24
End: 2024-01-11
Payer: COMMERCIAL

## 2024-01-11 DIAGNOSIS — F41.9 ANXIETY DISORDER, UNSPECIFIED TYPE: Primary | ICD-10-CM

## 2024-01-11 DIAGNOSIS — F33.41 RECURRENT MAJOR DEPRESSIVE DISORDER, IN PARTIAL REMISSION (H): ICD-10-CM

## 2024-01-11 PROCEDURE — 90791 PSYCH DIAGNOSTIC EVALUATION: CPT | Mod: 95 | Performed by: SOCIAL WORKER

## 2024-01-11 ASSESSMENT — PATIENT HEALTH QUESTIONNAIRE - PHQ9
10. IF YOU CHECKED OFF ANY PROBLEMS, HOW DIFFICULT HAVE THESE PROBLEMS MADE IT FOR YOU TO DO YOUR WORK, TAKE CARE OF THINGS AT HOME, OR GET ALONG WITH OTHER PEOPLE: NOT DIFFICULT AT ALL
SUM OF ALL RESPONSES TO PHQ QUESTIONS 1-9: 1
SUM OF ALL RESPONSES TO PHQ QUESTIONS 1-9: 1
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
## 2024-01-28 ENCOUNTER — HEALTH MAINTENANCE LETTER (OUTPATIENT)
Age: 24
End: 2024-01-28

## 2024-02-21 ENCOUNTER — VIRTUAL VISIT (OUTPATIENT)
Dept: BEHAVIORAL HEALTH | Facility: CLINIC | Age: 24
End: 2024-02-21
Payer: COMMERCIAL

## 2024-02-21 DIAGNOSIS — F41.9 ANXIETY DISORDER, UNSPECIFIED TYPE: Primary | ICD-10-CM

## 2024-02-21 DIAGNOSIS — F33.41 RECURRENT MAJOR DEPRESSIVE DISORDER, IN PARTIAL REMISSION (H): ICD-10-CM

## 2024-02-21 PROCEDURE — 90834 PSYTX W PT 45 MINUTES: CPT | Mod: 95 | Performed by: SOCIAL WORKER

## 2024-02-21 ASSESSMENT — PATIENT HEALTH QUESTIONNAIRE - PHQ9
SUM OF ALL RESPONSES TO PHQ QUESTIONS 1-9: 3
SUM OF ALL RESPONSES TO PHQ QUESTIONS 1-9: 3
10. IF YOU CHECKED OFF ANY PROBLEMS, HOW DIFFICULT HAVE THESE PROBLEMS MADE IT FOR YOU TO DO YOUR WORK, TAKE CARE OF THINGS AT HOME, OR GET ALONG WITH OTHER PEOPLE: NOT DIFFICULT AT ALL

## 2024-02-21 NOTE — PROGRESS NOTES
[facility] Primary Care: Integrated Behavioral Health  2024        Behavioral Health Clinician Progress Note    Patient Name: Shelbi Johnson           Service Type:  Individual      Service Location:   MyChart / Email (patient reached)     Session Start Time: 4:02p  Session End Time:4:50p      Session Length: 38 - 52      Attendees: Client     Service Modality:  Video Visit:      Provider verified identity through the following two step process.  Patient provided:  Patient  and Patient address    Telemedicine Visit: The patient's condition can be safely assessed and treated via synchronous audio and visual telemedicine encounter.      Reason for Telemedicine Visit: Patient has requested telehealth visit    Originating Site (Patient Location): Patient's home    Distant Site (Provider Location): [facility]    Consent:  The patient/guardian has verbally consented to: the potential risks and benefits of telemedicine (video visit) versus in person care; bill my insurance or make self-payment for services provided; and responsibility for payment of non-covered services.     Patient would like the video invitation sent by:  My Chart    Mode of Communication:  Video Conference via [facility]    Distant Location (Provider):  On-site    As the provider I attest to compliance with applicable laws and regulations related to telemedicine.    Visit Activities (Refresh list every visit): [facility] Only    Diagnostic Assessment Date: 24  Treatment Plan Review Date: 24  See Flowsheets for today's PHQ-9 and NEY-7 results  Previous PHQ-9:       2023     2:29 PM 2024     6:08 AM 2024     7:11 AM   PHQ-9 SCORE   PHQ-9 Total Score Reyest 3 (Minimal depression) 1 (Minimal depression) 3 (Minimal depression)   PHQ-9 Total Score 3 1 3     Previous NEY-7:       10/11/2022    10:49 AM 3/15/2023     4:11 PM 2023     3:42 PM   NEY-7 SCORE   Total  Score 1 10 19         DATA  Extended Session (60+ minutes): No  Interactive Complexity: No  Crisis: No  [facility] Patient: No    Treatment Objective(s) Addressed in This Session:  Target Behavior(s): disease management/lifestyle changes recent increase in mood symptoms    Depressed Mood: Decrease frequency and intensity of feeling down, depressed, hopeless  Anxiety: will experience a reduction in anxiety and will develop more effective coping skills to manage anxiety symptoms    Current Stressors / Issues:  Pt shares that she is doing ok though a bit stressed as she is moving to [place] in 2 weeks.  Is a little stressed about moving though does have friends that have stepped up to help.  Has her apartment for 1 more month after moves so is thankful that she can take her time with getting everything out..    Plan is to get engaged to boyfriend in April at his end of the year concert.  Did tell her mother and mother has shared it with pt's father. Pt shares that she feels like her father is taking small steps in accepting pt's boyfriend.  Pt shares that her father did say he would come and help her move though it isn't going to work out.  Pt shares that she has most of their trip to [place] in May planned out.    Pt reports that overall mood is good.  [facility] and pt to connect in a little over a month once pt has moved and settled into her new apartment.        Progress on Treatment Objective(s) / Homework:  Satisfactory progress - ACTION (Actively working towards change); Intervened by reinforcing change plan / affirming steps taken    Motivational Interviewing    MI Intervention: Expressed Empathy/Understanding, Supported Autonomy, Collaboration, Evocation, Permission to raise concern or advise, Open-ended questions, and Reflections: simple and complex     Change Talk Expressed by the Patient: Desire to change Committment to change    Provider Response to Change Talk: E - Evoked more info from patient about  behavior change, A - Affirmed patient's thoughts, decisions, or attempts at behavior change, R - Reflected patient's change talk, and S - Summarized patient's change talk statements    Also provided psychoeducation about behavioral health condition, symptoms, and treatment options    Cognitive-Behavioral Therapy and ACT    Assessments completed prior to visit:  The following assessments were completed by patient for this visit:  PHQ9:       10/11/2022    10:49 AM 3/15/2023     4:09 PM 11/5/2023     6:39 PM 11/7/2023     3:42 PM 12/6/2023     2:29 PM 1/11/2024     6:08 AM 2/21/2024     7:11 AM   PHQ-9 SCORE   PHQ-9 Total Score MyChart   14 (Moderate depression)  3 (Minimal depression) 1 (Minimal depression) 3 (Minimal depression)   PHQ-9 Total Score 3 5 14 14 3 1 3     GAD7:       10/11/2022    10:49 AM 3/15/2023     4:11 PM 11/7/2023     3:42 PM   NEY-7 SCORE   Total Score 1 10 19     CAGE-AID:       12/4/2023     1:53 PM   CAGE-AID Total Score   Total Score 1   Total Score MyChart 1 (A total score of 2 or greater is considered clinically significant)     PROMIS 10-Global Health (only subscores and total score):       12/4/2023     1:53 PM   PROMIS-10 Scores Only   Global Mental Health Score 12   Global Physical Health Score 15   PROMIS TOTAL - SUBSCORES 27     [place] Suicide Severity Rating Scale (Lifetime/Recent)      12/6/2023     3:37 PM   [place] Suicide Severity Rating (Lifetime/Recent)   Q1 Wish to be Dead (Lifetime) Y   1. Wish to be Dead (Past 1 Month) N   Q2 Non-Specific Active Suicidal Thoughts (Lifetime) Y   2. Non-Specific Active Suicidal Thoughts (Past 1 Month) N   3. Active Suicidal Ideation with any Methods (Not Plan) Without Intent to Act (Lifetime) Y   Q3 Active Suicidal Ideation with any Methods (Not Plan) Without Intent to Act (Past 1 Month) N   Q4 Active Suicidal Ideation with Some Intent to Act, Without Specific Plan (Lifetime) Y   4. Active Suicidal Ideation with Some Intent to Act, Without  Specific Plan (Past 1 Month) N   Q5 Active Suicidal Ideation with Specific Plan and Intent (Lifetime) N   Most Severe Ideation Rating (Lifetime) 3   Frequency (Lifetime) 3   Duration (Lifetime) 3   Controllability (Lifetime) 3   Deterrents (Lifetime) 1   Reasons for Ideation (Lifetime) 5   Actual Attempt (Lifetime) N   Has subject engaged in non-suicidal self-injurious behavior? (Lifetime) Y   Has subject engaged in non-suicidal self-injurious behavior? (Past 3 Months) N   Interrupted Attempts (Lifetime) N   Interrupted Attempts (Past 3 Months) Y   Total Number of Interrupted Attempts (Past 3 Months) 1   Interrupted Attempt Description (Past 3 Months) Took 2 melatonin   Aborted or Self-Interrupted Attempt (Lifetime) N   Preparatory Acts or Behavior (Lifetime) N   Calculated C-SSRS Risk Score (Lifetime/Recent) High Risk       Care Plan review completed: No    Medication Review:  Changes to psychiatric medications, see updated Medication List in EPIC.     Medication Compliance:  Yes    Changes in Health Issues:   None reported    Chemical Use Review:   Substance Use: Chemical use reviewed, no active concerns identified      Tobacco Use: No current tobacco use.      Assessment: Current Emotional / Mental Status (status of significant symptoms):  Risk status (Self / Other harm or suicidal ideation)  Patient has had a history of suicidal ideation: past SI.  Denies any current and self-injurious behavior: hx of SIB in middle school  Patient denies current fears or concerns for personal safety.  Patient denies current or recent suicidal ideation or behaviors.  Patient denies current or recent homicidal ideation or behaviors.  Patient denies current or recent self injurious behavior or ideation.  Patient denies other safety concerns.  A safety and risk management plan has not been developed at this time, however patient was encouraged to call [facility] should there be a change in any of these risk factors.   Verbally discussed safety planning.    Appearance:   Appropriate   Eye Contact:   Good   Psychomotor Behavior: Normal   Attitude:   Cooperative   Orientation:   All  Speech   Rate / Production: Normal    Volume:  Normal   Mood:    Normal  Affect:    Appropriate   Thought Content:  Clear   Thought Form:  Coherent  Logical   Insight:    Good     Diagnoses:  1. Anxiety disorder, unspecified type    2. Recurrent major depressive disorder, in partial remission (H24)        Collateral Reports Completed:  Not Applicable    Plan: (Homework, other):  Patient was given information about behavioral services and encouraged to schedule a follow up appointment with the clinic [facility] in 1 month.  She was also given information about mental health symptoms and treatment options .  CD Recommendations: No indications of CD issues.                                                 Individual Treatment Plan    Patient's Name: Shelbi Johnson  YOB: 2000    Date of Creation: 2/21/24   Date Treatment Plan Last Reviewed/Revised: 2/21/24    DSM5 Diagnoses: 296.35 (F33.41)  Major Depressive Disorder, Recurrent Episode, In partial remission _ and With anxious distress or 300.02 (F41.1) Generalized Anxiety Disorder  Psychosocial / Contextual Factors: moving, relationship with father,   PROMIS (reviewed every 90 days):   PROMIS-10 Scores        12/4/2023     1:53 PM   PROMIS-10 Total Score w/o Sub Scores   PROMIS TOTAL - SUBSCORES 27         Referral / Collaboration:  Referral to another professional/service is not indicated at this time..    Anticipated number of session for this episode of care: 3-6 sessions  Anticipation frequency of session: Monthly  Anticipated Duration of each session: 38-52 minutes  Treatment plan will be reviewed in 90 days or when goals have been changed.       MeasurableTreatment Goal(s) related to diagnosis / functional impairment(s)  Goal 1: Patient will experience a reduction in depression symptoms  alone with a corresponding increase in positive emotions and life satisfaction.    I will know I've met my goal when enjoy the activities that she likes to do.      Objective #A (Patient Action)    Patient will use cognitive strategies identified in therapy to challenge anxious thoughts  Increase interest, engagement, and pleasure in doing things.  Status: New - Date: 2/21/24      Intervention(s)  Therapist will introduce pt to cognitive-behavioral and acceptance and commitment therapy topics aimed to help reduce depression and anxiety.        Migdalia Valiente, RIDDHI, [facility], [facility]   February 21, 2024

## 2024-04-16 ENCOUNTER — OFFICE VISIT (OUTPATIENT)
Dept: BEHAVIORAL HEALTH | Facility: CLINIC | Age: 24
End: 2024-04-16
Payer: COMMERCIAL

## 2024-04-16 DIAGNOSIS — F41.9 ANXIETY DISORDER, UNSPECIFIED TYPE: Primary | ICD-10-CM

## 2024-04-16 DIAGNOSIS — F33.41 RECURRENT MAJOR DEPRESSIVE DISORDER, IN PARTIAL REMISSION (H): ICD-10-CM

## 2024-04-16 PROCEDURE — 90834 PSYTX W PT 45 MINUTES: CPT | Performed by: SOCIAL WORKER

## 2024-04-16 ASSESSMENT — ANXIETY QUESTIONNAIRES
GAD7 TOTAL SCORE: 12
4. TROUBLE RELAXING: MORE THAN HALF THE DAYS
5. BEING SO RESTLESS THAT IT IS HARD TO SIT STILL: MORE THAN HALF THE DAYS
8. IF YOU CHECKED OFF ANY PROBLEMS, HOW DIFFICULT HAVE THESE MADE IT FOR YOU TO DO YOUR WORK, TAKE CARE OF THINGS AT HOME, OR GET ALONG WITH OTHER PEOPLE?: SOMEWHAT DIFFICULT
IF YOU CHECKED OFF ANY PROBLEMS ON THIS QUESTIONNAIRE, HOW DIFFICULT HAVE THESE PROBLEMS MADE IT FOR YOU TO DO YOUR WORK, TAKE CARE OF THINGS AT HOME, OR GET ALONG WITH OTHER PEOPLE: SOMEWHAT DIFFICULT
2. NOT BEING ABLE TO STOP OR CONTROL WORRYING: SEVERAL DAYS
GAD7 TOTAL SCORE: 12
7. FEELING AFRAID AS IF SOMETHING AWFUL MIGHT HAPPEN: SEVERAL DAYS
GAD7 TOTAL SCORE: 12
6. BECOMING EASILY ANNOYED OR IRRITABLE: NEARLY EVERY DAY
1. FEELING NERVOUS, ANXIOUS, OR ON EDGE: MORE THAN HALF THE DAYS
3. WORRYING TOO MUCH ABOUT DIFFERENT THINGS: SEVERAL DAYS
7. FEELING AFRAID AS IF SOMETHING AWFUL MIGHT HAPPEN: SEVERAL DAYS

## 2024-04-16 ASSESSMENT — PATIENT HEALTH QUESTIONNAIRE - PHQ9
10. IF YOU CHECKED OFF ANY PROBLEMS, HOW DIFFICULT HAVE THESE PROBLEMS MADE IT FOR YOU TO DO YOUR WORK, TAKE CARE OF THINGS AT HOME, OR GET ALONG WITH OTHER PEOPLE: SOMEWHAT DIFFICULT
SUM OF ALL RESPONSES TO PHQ QUESTIONS 1-9: 5
SUM OF ALL RESPONSES TO PHQ QUESTIONS 1-9: 5

## 2024-04-16 NOTE — PROGRESS NOTES
[facility] Primary Care: Integrated Behavioral Health  April 16, 2024          Behavioral Health Clinician Progress Note    Patient Name: Shelbi Johnson           Service Type:  Individual      Service Location:   Face to Face in Clinic     Session Start Time: 4:02p  Session End Time:4:54p      Session Length: 38 - 52      Attendees: Client     Service Modality:  in-person      Provider verified identity through the following two step process.  Patient provided:  Patient is known previously to provider        Visit Activities (Refresh list every visit): [facility] Only    Diagnostic Assessment Date: 1/11/24  Treatment Plan Review Date: 5/21/24  See Flowsheets for today's PHQ-9 and NEY-7 results  Previous PHQ-9:       1/11/2024     6:08 AM 2/21/2024     7:11 AM 4/16/2024     7:11 AM   PHQ-9 SCORE   PHQ-9 Total Score MyChart 1 (Minimal depression) 3 (Minimal depression) 5 (Mild depression)   PHQ-9 Total Score 1 3 5     Previous NEY-7:       3/15/2023     4:11 PM 11/7/2023     3:42 PM 4/16/2024     7:12 AM   NEY-7 SCORE   Total Score   12 (moderate anxiety)   Total Score 10 19 12         DATA  Extended Session (60+ minutes): No  Interactive Complexity: No  Crisis: No  [facility] Patient: No    Treatment Objective(s) Addressed in This Session:  Target Behavior(s): disease management/lifestyle changes recent increase in mood symptoms    Depressed Mood: Decrease frequency and intensity of feeling down, depressed, hopeless  Anxiety: will experience a reduction in anxiety and will develop more effective coping skills to manage anxiety symptoms    Current Stressors / Issues:  Pt reports that things have been going well over the past month however is feeling quite overwhelmed.  Pt did get overwhelmed earlier this month and says that it has been a whirlwind since then.  Pt shares that the engagement was wonderful and better than she imagined.  Pt shares that her father is going to be in town next week and  will stop over at her and her fiance's apartment.  Pt is now getting ready for their [place] trip and feeling a bit overwhelmed with what has to pack and do before they go.    Pt shares that mood has been good as far as depression symptoms and that anxiety right now is situational.  Pt and [facility] talked about prioritizing tasks and getting help from others where she can.      Progress on Treatment Objective(s) / Homework:  Satisfactory progress - ACTION (Actively working towards change); Intervened by reinforcing change plan / affirming steps taken    Motivational Interviewing    MI Intervention: Expressed Empathy/Understanding, Supported Autonomy, Collaboration, Evocation, Permission to raise concern or advise, Open-ended questions, and Reflections: simple and complex     Change Talk Expressed by the Patient: Desire to change Committment to change    Provider Response to Change Talk: E - Evoked more info from patient about behavior change, A - Affirmed patient's thoughts, decisions, or attempts at behavior change, R - Reflected patient's change talk, and S - Summarized patient's change talk statements    Also provided psychoeducation about behavioral health condition, symptoms, and treatment options    Cognitive-Behavioral Therapy and ACT    Assessments completed prior to visit:  The following assessments were completed by patient for this visit:  PHQ9:       3/15/2023     4:09 PM 11/5/2023     6:39 PM 11/7/2023     3:42 PM 12/6/2023     2:29 PM 1/11/2024     6:08 AM 2/21/2024     7:11 AM 4/16/2024     7:11 AM   PHQ-9 SCORE   PHQ-9 Total Score MyChart  14 (Moderate depression)  3 (Minimal depression) 1 (Minimal depression) 3 (Minimal depression) 5 (Mild depression)   PHQ-9 Total Score 5 14 14 3 1 3 5     GAD7:       10/11/2022    10:49 AM 3/15/2023     4:11 PM 11/7/2023     3:42 PM 4/16/2024     7:12 AM   NEY-7 SCORE   Total Score    12 (moderate anxiety)   Total Score 1 10 19 12     CAGE-AID:        12/4/2023     1:53 PM   CAGE-AID Total Score   Total Score 1   Total Score MyChart 1 (A total score of 2 or greater is considered clinically significant)     PROMIS 10-Global Health (only subscores and total score):       12/4/2023     1:53 PM 4/16/2024     7:12 AM   PROMIS-10 Scores Only   Global Mental Health Score 12 11   Global Physical Health Score 15 15   PROMIS TOTAL - SUBSCORES 27 26     [place] Suicide Severity Rating Scale (Lifetime/Recent)      12/6/2023     3:37 PM   [place] Suicide Severity Rating (Lifetime/Recent)   Q1 Wish to be Dead (Lifetime) Y   1. Wish to be Dead (Past 1 Month) N   Q2 Non-Specific Active Suicidal Thoughts (Lifetime) Y   2. Non-Specific Active Suicidal Thoughts (Past 1 Month) N   3. Active Suicidal Ideation with any Methods (Not Plan) Without Intent to Act (Lifetime) Y   Q3 Active Suicidal Ideation with any Methods (Not Plan) Without Intent to Act (Past 1 Month) N   Q4 Active Suicidal Ideation with Some Intent to Act, Without Specific Plan (Lifetime) Y   4. Active Suicidal Ideation with Some Intent to Act, Without Specific Plan (Past 1 Month) N   Q5 Active Suicidal Ideation with Specific Plan and Intent (Lifetime) N   Most Severe Ideation Rating (Lifetime) 3   Frequency (Lifetime) 3   Duration (Lifetime) 3   Controllability (Lifetime) 3   Deterrents (Lifetime) 1   Reasons for Ideation (Lifetime) 5   Actual Attempt (Lifetime) N   Has subject engaged in non-suicidal self-injurious behavior? (Lifetime) Y   Has subject engaged in non-suicidal self-injurious behavior? (Past 3 Months) N   Interrupted Attempts (Lifetime) N   Interrupted Attempts (Past 3 Months) Y   Total Number of Interrupted Attempts (Past 3 Months) 1   Interrupted Attempt Description (Past 3 Months) Took 2 melatonin   Aborted or Self-Interrupted Attempt (Lifetime) N   Preparatory Acts or Behavior (Lifetime) N   Calculated C-SSRS Risk Score (Lifetime/Recent) High Risk       Care Plan review completed: No    Medication  Review:  Changes to psychiatric medications, see updated Medication List in EPIC.     Medication Compliance:  Yes    Changes in Health Issues:   None reported    Chemical Use Review:   Substance Use: Chemical use reviewed, no active concerns identified      Tobacco Use: No current tobacco use.      Assessment: Current Emotional / Mental Status (status of significant symptoms):  Risk status (Self / Other harm or suicidal ideation)  Patient has had a history of suicidal ideation: past SI.  Denies any current and self-injurious behavior: hx of SIB in middle school  Patient denies current fears or concerns for personal safety.  Patient denies current or recent suicidal ideation or behaviors.  Patient denies current or recent homicidal ideation or behaviors.  Patient denies current or recent self injurious behavior or ideation.  Patient denies other safety concerns.  A safety and risk management plan has not been developed at this time, however patient was encouraged to call [place] [phone] should there be a change in any of these risk factors.  Verbally discussed safety planning.    Appearance:   Appropriate   Eye Contact:   Good   Psychomotor Behavior: Normal   Attitude:   Cooperative   Orientation:   All  Speech   Rate / Production: Normal    Volume:  Normal   Mood:    Normal  Affect:    Appropriate   Thought Content:  Clear   Thought Form:  Coherent  Logical   Insight:    Good     Diagnoses:  1. Anxiety disorder, unspecified type    2. Recurrent major depressive disorder, in partial remission (H24)        Collateral Reports Completed:  Not Applicable    Plan: (Homework, other):  Patient was given information about behavioral services and encouraged to schedule a follow up appointment with the clinic [facility] as needed.  She was also given information about mental health symptoms and treatment options .  CD Recommendations: No indications of CD issues.                                                 Individual  Treatment Plan    Patient's Name: Shelbi Johnson  YOB: 2000    Date of Creation: 2/21/24   Date Treatment Plan Last Reviewed/Revised: 2/21/24    DSM5 Diagnoses: 296.35 (F33.41)  Major Depressive Disorder, Recurrent Episode, In partial remission _ and With anxious distress or 300.02 (F41.1) Generalized Anxiety Disorder  Psychosocial / Contextual Factors: moving, relationship with father,   PROMIS (reviewed every 90 days):   PROMIS-10 Scores        12/4/2023     1:53 PM 4/16/2024     7:12 AM   PROMIS-10 Total Score w/o Sub Scores   PROMIS TOTAL - SUBSCORES 27 26         Referral / Collaboration:  Referral to another professional/service is not indicated at this time..    Anticipated number of session for this episode of care: 3-6 sessions  Anticipation frequency of session: Monthly  Anticipated Duration of each session: 38-52 minutes  Treatment plan will be reviewed in 90 days or when goals have been changed.       MeasurableTreatment Goal(s) related to diagnosis / functional impairment(s)  Goal 1: Patient will experience a reduction in depression symptoms alone with a corresponding increase in positive emotions and life satisfaction.    I will know I've met my goal when enjoy the activities that she likes to do.      Objective #A (Patient Action)    Patient will use cognitive strategies identified in therapy to challenge anxious thoughts  Increase interest, engagement, and pleasure in doing things.  Status: New - Date: 2/21/24      Intervention(s)  Therapist will introduce pt to cognitive-behavioral and acceptance and commitment therapy topics aimed to help reduce depression and anxiety.        Migdalia Valiente, RIDDHI, LICSW, [facility]   April 16, 2024

## 2024-07-22 ENCOUNTER — VIRTUAL VISIT (OUTPATIENT)
Dept: FAMILY MEDICINE | Facility: CLINIC | Age: 24
End: 2024-07-22
Payer: COMMERCIAL

## 2024-07-22 DIAGNOSIS — N30.00 ACUTE CYSTITIS WITHOUT HEMATURIA: Primary | ICD-10-CM

## 2024-07-22 DIAGNOSIS — N18.2 CHRONIC KIDNEY DISEASE, STAGE II (MILD): ICD-10-CM

## 2024-07-22 PROCEDURE — 99213 OFFICE O/P EST LOW 20 MIN: CPT | Mod: 95 | Performed by: PHYSICIAN ASSISTANT

## 2024-07-22 RX ORDER — NITROFURANTOIN 25; 75 MG/1; MG/1
100 CAPSULE ORAL 2 TIMES DAILY
Qty: 14 CAPSULE | Refills: 0 | Status: SHIPPED | OUTPATIENT
Start: 2024-07-22 | End: 2024-08-05

## 2024-07-22 NOTE — PROGRESS NOTES
aKren is a 24 year old who is being evaluated via a billable video visit.    How would you like to obtain your AVS? MyChart  If the video visit is dropped, the invitation should be resent by: Text to cell phone: [phone]  Will anyone else be joining your video visit? No    Assessment & Plan       ICD-10-CM    1. Acute cystitis without hematuria  N30.00 nitroFURantoin macrocrystal-monohydrate (MACROBID) 100 MG capsule        Will treat empirically based on symptoms, kidney history and similar presentation to past infections  Patient understands that if symptoms worsen or fail to improve she will need to be seen in clinic                Subjective   Karen is a 24 year old, presenting for the following health issues:  UTI        7/22/2024     4:46 PM   Additional Questions   Roomed by MANUELA Mcdonnell     UTI    History of Present Illness       Reason for visit:  UTI  Symptom onset:  Today  Symptoms include:  Bladder feeling full even after emptyin  Symptom intensity:  Mild  Symptom progression:  Staying the same  Had these symptoms before:  Yes  Has tried/received treatment for these symptoms:  Yes  Previous treatment was successful:  Yes  Prior treatment description:  Prescription for UTI  What makes it worse:  No  What makes it better:  No    She eats 0-1 servings of fruits and vegetables daily.She consumes 1 sweetened beverage(s) daily.She exercises with enough effort to increase her heart rate 9 or less minutes per day.  She exercises with enough effort to increase her heart rate 3 or less days per week. She is missing 1 dose(s) of medications per week.  She is not taking prescribed medications regularly due to remembering to take.     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    Physical Exam   GENERAL: alert and no distress  EYES: Eyes grossly normal to inspection.  No discharge or erythema, or obvious scleral/conjunctival abnormalities.  RESP: No audible wheeze, cough, or visible cyanosis.    SKIN: Visible skin clear. No significant rash, abnormal pigmentation or lesions.  NEURO: Cranial nerves grossly intact.  Mentation and speech appropriate for age.  PSYCH: Appropriate affect, tone, and pace of words          Video-Visit Details    Type of service:  Video Visit   Originating Location (pt. Location): Home    Distant Location (provider location):  On-site  Platform used for Video Visit: Caron  Signed Electronically by: Ela Hcek PA-C

## 2024-08-05 ENCOUNTER — OFFICE VISIT (OUTPATIENT)
Dept: FAMILY MEDICINE | Facility: CLINIC | Age: 24
End: 2024-08-05
Payer: COMMERCIAL

## 2024-08-05 ENCOUNTER — TELEPHONE (OUTPATIENT)
Dept: FAMILY MEDICINE | Facility: CLINIC | Age: 24
End: 2024-08-05

## 2024-08-05 VITALS
TEMPERATURE: 98.5 F | WEIGHT: 155.6 LBS | DIASTOLIC BLOOD PRESSURE: 74 MMHG | HEIGHT: 65 IN | HEART RATE: 76 BPM | BODY MASS INDEX: 25.92 KG/M2 | OXYGEN SATURATION: 99 % | RESPIRATION RATE: 16 BRPM | SYSTOLIC BLOOD PRESSURE: 102 MMHG

## 2024-08-05 DIAGNOSIS — F43.10 PTSD (POST-TRAUMATIC STRESS DISORDER): ICD-10-CM

## 2024-08-05 DIAGNOSIS — N18.2 CHRONIC KIDNEY DISEASE, STAGE II (MILD): Primary | ICD-10-CM

## 2024-08-05 DIAGNOSIS — R23.2 HOT FLASHES: ICD-10-CM

## 2024-08-05 DIAGNOSIS — F44.4 CONVERSION DISORDER WITH ABNORMAL MOVEMENT: ICD-10-CM

## 2024-08-05 LAB
BASOPHILS # BLD AUTO: 0.1 10E3/UL (ref 0–0.2)
BASOPHILS NFR BLD AUTO: 1 %
EOSINOPHIL # BLD AUTO: 0.3 10E3/UL (ref 0–0.7)
EOSINOPHIL NFR BLD AUTO: 5 %
ERYTHROCYTE [DISTWIDTH] IN BLOOD BY AUTOMATED COUNT: 11.5 % (ref 10–15)
HCT VFR BLD AUTO: 43.8 % (ref 35–47)
HGB BLD-MCNC: 14.3 G/DL (ref 11.7–15.7)
IMM GRANULOCYTES # BLD: 0 10E3/UL
IMM GRANULOCYTES NFR BLD: 0 %
LYMPHOCYTES # BLD AUTO: 2.4 10E3/UL (ref 0.8–5.3)
LYMPHOCYTES NFR BLD AUTO: 37 %
MCH RBC QN AUTO: 30.8 PG (ref 26.5–33)
MCHC RBC AUTO-ENTMCNC: 32.6 G/DL (ref 31.5–36.5)
MCV RBC AUTO: 94 FL (ref 78–100)
MONOCYTES # BLD AUTO: 0.4 10E3/UL (ref 0–1.3)
MONOCYTES NFR BLD AUTO: 7 %
NEUTROPHILS # BLD AUTO: 3.3 10E3/UL (ref 1.6–8.3)
NEUTROPHILS NFR BLD AUTO: 51 %
PLATELET # BLD AUTO: 216 10E3/UL (ref 150–450)
RBC # BLD AUTO: 4.65 10E6/UL (ref 3.8–5.2)
WBC # BLD AUTO: 6.4 10E3/UL (ref 4–11)

## 2024-08-05 PROCEDURE — 36415 COLL VENOUS BLD VENIPUNCTURE: CPT | Performed by: NURSE PRACTITIONER

## 2024-08-05 PROCEDURE — 82043 UR ALBUMIN QUANTITATIVE: CPT | Performed by: NURSE PRACTITIONER

## 2024-08-05 PROCEDURE — 85025 COMPLETE CBC W/AUTO DIFF WBC: CPT | Mod: QW | Performed by: NURSE PRACTITIONER

## 2024-08-05 PROCEDURE — 84443 ASSAY THYROID STIM HORMONE: CPT | Performed by: NURSE PRACTITIONER

## 2024-08-05 PROCEDURE — 80048 BASIC METABOLIC PNL TOTAL CA: CPT | Performed by: NURSE PRACTITIONER

## 2024-08-05 PROCEDURE — 82570 ASSAY OF URINE CREATININE: CPT | Performed by: NURSE PRACTITIONER

## 2024-08-05 PROCEDURE — 99214 OFFICE O/P EST MOD 30 MIN: CPT | Performed by: NURSE PRACTITIONER

## 2024-08-05 NOTE — PROGRESS NOTES
"  Assessment & Plan     (N18.2) Chronic kidney disease, stage II (mild)  (primary encounter diagnosis)  Comment:   Plan: BASIC METABOLIC PANEL, Albumin Random Urine         Quantitative with Creat Ratio, ACE/ARB/ARNI NOT        PRESCRIBED (INTENTIONAL), Basic metabolic panel        (Ca, Cl, CO2, Creat, Gluc, K, Na, BUN)        She agrees to labs and appt with DR MULU Quinones , nephrology     (R23.2) Hot flashes  Comment:   Plan: TSH with free T4 reflex, CBC with Platelets &         Differential            (F43.10) PTSD (post-traumatic stress disorder)  Comment:   Plan:     (F44.4) Conversion disorder with abnormal movement  Comment:   Plan:   I explained that being this is the first time I have met her I am happy to complete her disability paper work with her but that she may be asked to provide documentation regarding her evaluation and dx should her employer require it, as I don't have any of those records. She has not seen psychiatry or any medical professional managing this condition for about 6 years. Currently it is quite stable but has potential for flares which would make getting to work impossible and would make actually using a computer and working impossible if the flare is significant          BMI  Estimated body mass index is 26.09 kg/m  as calculated from the following:    Height as of this encounter: 1.645 m (5' 4.75\").    Weight as of this encounter: 70.6 kg (155 lb 9.6 oz).             Sushila Jones is a 24 year old, presenting for the following health issues: has accommodations paper work for her employer, having issues with body temp, either very cold or very hot, says she does not sweat      Diagnosed at age 17 with conversion disorder at [facility] triggered by PTSD.  She was in therapy for a while with [facility] psychology. Was told there isn't medication for it , she needed to re-learn how to walk  (altered between walker and wheelchair).   Saw Filippo Agarwals her freshman year of " "highschool.  Was at [employer] her senior year. Difficulty graduating high school. Issues on/off during college. Was told she needs to 're-wire'.  Has been told to meditate. She has been somewhat successful with this technique.  Part of her brain short circuts and shuts down mobility. She is an  and hopes to work from home if she has a shut down of her mobility. Work is more stressful and she is having flare ups.      Hx of CKD. Last saw a kidney specialist age 15 and recommended yearly labs but she is due for labs  Forms        8/5/2024     5:03 PM   Additional Questions   Roomed by myke arroyo   Accompanied by self     History of Present Illness       Reason for visit:  Having issues regulating body temp, and also need ada paperwork filled out for work    She eats 0-1 servings of fruits and vegetables daily.She consumes 0 sweetened beverage(s) daily.She exercises with enough effort to increase her heart rate 9 or less minutes per day.  She exercises with enough effort to increase her heart rate 3 or less days per week. She is missing 1 dose(s) of medications per week.  She is not taking prescribed medications regularly due to remembering to take.                 Objective    /74 (BP Location: Right arm, Patient Position: Sitting)   Pulse 76   Temp 98.5  F (36.9  C)   Resp 16   Ht 1.645 m (5' 4.75\")   Wt 70.6 kg (155 lb 9.6 oz)   LMP  (LMP Unknown)   SpO2 99%   Breastfeeding No   BMI 26.09 kg/m    Body mass index is 26.09 kg/m .  Physical Exam   GENERAL: alert and no distress  RESP: lungs clear to auscultation - no rales, rhonchi or wheezes  CV: regular rate and rhythm, normal S1 S2, no S3 or S4, no murmur, click or rub, no peripheral edema  MS: no gross musculoskeletal defects noted, no edema  PSYCH: mentation appears normal, affect normal/bright            Signed Electronically by: Sri Riggins NP    "

## 2024-08-05 NOTE — TELEPHONE ENCOUNTER
Called Karen and left a voicemail that we need to reschedule the appointment with Dr. 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

## 2024-08-06 LAB
ANION GAP SERPL CALCULATED.3IONS-SCNC: 10 MMOL/L (ref 7–15)
BUN SERPL-MCNC: 14.7 MG/DL (ref 6–20)
CALCIUM SERPL-MCNC: 9.5 MG/DL (ref 8.8–10.4)
CHLORIDE SERPL-SCNC: 105 MMOL/L (ref 98–107)
CREAT SERPL-MCNC: 1.23 MG/DL (ref 0.51–0.95)
CREAT UR-MCNC: 23.5 MG/DL
EGFRCR SERPLBLD CKD-EPI 2021: 63 ML/MIN/1.73M2
GLUCOSE SERPL-MCNC: 90 MG/DL (ref 70–99)
HCO3 SERPL-SCNC: 25 MMOL/L (ref 22–29)
MICROALBUMIN UR-MCNC: <12 MG/L
MICROALBUMIN/CREAT UR: NORMAL MG/G{CREAT}
POTASSIUM SERPL-SCNC: 4.8 MMOL/L (ref 3.4–5.3)
SODIUM SERPL-SCNC: 140 MMOL/L (ref 135–145)
TSH SERPL DL<=0.005 MIU/L-ACNC: 1.02 UIU/ML (ref 0.3–4.2)

## 2024-08-09 ENCOUNTER — MYC MEDICAL ADVICE (OUTPATIENT)
Dept: FAMILY MEDICINE | Facility: CLINIC | Age: 24
End: 2024-08-09
Payer: COMMERCIAL

## 2024-08-09 NOTE — TELEPHONE ENCOUNTER
Sri,    You just saw this patient earlier this week and completed some different forms. Do you need to see her again or can you complete these forms without a visit?

## 2024-08-12 NOTE — TELEPHONE ENCOUNTER
Forms have been completed. I will mail a copy to patient's home address. A copy was also made for our records.

## 2024-08-20 NOTE — TELEPHONE ENCOUNTER
Patient called in to say she rec'd a call and states she would like to  copy of [facility] ppw.  Pt was informed that this was mailed on 8/12/2024; where pt states she did not receive it.      Copy placed at  for pick-up.

## 2024-08-20 NOTE — TELEPHONE ENCOUNTER
Pt called requesting a copy of previous [facility] paperwork. Copy printed and left at  for .     LVM for pt to call back if a copy of paperwork needs to be sent/mailed elsewhere

## 2024-08-22 ENCOUNTER — OFFICE VISIT (OUTPATIENT)
Dept: FAMILY MEDICINE | Facility: CLINIC | Age: 24
End: 2024-08-22
Payer: COMMERCIAL

## 2024-08-22 VITALS
HEART RATE: 66 BPM | BODY MASS INDEX: 25.66 KG/M2 | TEMPERATURE: 99.6 F | RESPIRATION RATE: 10 BRPM | SYSTOLIC BLOOD PRESSURE: 116 MMHG | DIASTOLIC BLOOD PRESSURE: 78 MMHG | WEIGHT: 154 LBS | HEIGHT: 65 IN | OXYGEN SATURATION: 98 %

## 2024-08-22 DIAGNOSIS — Z12.4 CERVICAL CANCER SCREENING: Primary | ICD-10-CM

## 2024-08-22 DIAGNOSIS — N18.2 CHRONIC KIDNEY DISEASE, STAGE II (MILD): ICD-10-CM

## 2024-08-22 PROCEDURE — 99214 OFFICE O/P EST MOD 30 MIN: CPT | Performed by: INTERNAL MEDICINE

## 2024-08-22 NOTE — PROGRESS NOTES
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 Moy through [facility]  -Previously with iothalmate based direct GFR measurements historically in the 60s  -eGFR, creatinine based; 60 to 70 mL/min; baseline serum creatinine 1.2    2024: Referred to me for CKD evaluation; no follow-up with pediatric nephrology since age 18  -Stable overall GFR; absence of any albuminuria  -Discussed implications of her CKD based on low renal mass   -Importance of continued annual GFR, urine albumin creatinine ratio surveillance  -Reassuringly, very healthy history otherwise; stressed importance of avoiding other secondary renal disease including avoidance of diabetes, hypertension, general vascular disease  -Did share potential increased risks related to future pregnancy given low GFR, specifically potential risk on increased preeclampsia risk.  Shared that it would be important for her to have general family-planning and would involve an obstetrician from the beginning of planned conception.  Very likely may need to be established with maternal-fetal medicine.  No family-planning at this point; anticipates this in 4 to 6 years          Other Visit Diagnoses       Cervical cancer screening    -  Primary                  BMI  Estimated body mass index is 25.83 kg/m  as calculated from the following:    Height as of this encounter: 1.645 m (5' 4.75\").    Weight as of this encounter: 69.9 kg (154 lb).       No dedicated renal follow-up at this point, happy to see in the future      Subjective   Karen is a 24 year old, " "presenting for the following health issues: She is referred to renal clinic by [facility] for evaluation chronic kidney disease.  Shares complicated  history with hemorrhagic shock, resulting in significant ischemic acute kidney injury; shares nearly being started on renal replacement therapy at birth, though ultimately showed some improvement in kidney function.  Was followed by pediatric nephrology through age 18.  Recalls having a series of renal ultrasound showing significantly reduced overall renal mass and cortical atrophy.  Recalls having prior iothalamate based GFR determination measured in the 60s.  Has not had any renal follow-up since age 18.  No history of kidney stones.  Recalls being on enalapril earlier during her childhood time; has not been maintained on any antihypertensive therapy since then.  We did discuss future family-planning; no current plans for pregnancy; anticipates family-planning in the next 4 to 6 years  Consult (CKD)        2024     4:31 PM   Additional Questions   Roomed by Rose ZHU   Accompanied by self     History of Present Illness       CKD: She uses over the counter pain medication, including Aceteminaphen, a few times a month.    She eats 0-1 servings of fruits and vegetables daily.She consumes 1 sweetened beverage(s) daily.She exercises with enough effort to increase her heart rate 10 to 19 minutes per day.  She exercises with enough effort to increase her heart rate 3 or less days per week. She is missing 1 dose(s) of medications per week.  She is not taking prescribed medications regularly due to remembering to take.         Review of Systems  Constitutional, HEENT, cardiovascular, pulmonary, gi and gu systems are negative, except as otherwise noted.      Objective    /78   Pulse 66   Temp 99.6  F (37.6  C)   Resp 10   Ht 1.645 m (5' 4.75\")   Wt 69.9 kg (154 lb)   LMP  (LMP Unknown)   SpO2 98%   BMI 25.83 kg/m    Body mass index is 25.83 " kg/m .  Physical Exam   GENERAL: alert and no distress  NECK: no adenopathy, no asymmetry, masses, or scars  RESP: lungs clear to auscultation - no rales, rhonchi or wheezes  CV: regular rate and rhythm, normal S1 S2, no S3 or S4, no murmur, click or rub, no peripheral edema  ABDOMEN: soft, nontender, no hepatosplenomegaly, no masses and bowel sounds normal  MS: no gross musculoskeletal defects noted, no edema    Office Visit on 08/05/2024   Component Date Value Ref Range Status    Sodium 08/05/2024 140  135 - 145 mmol/L Final    Potassium 08/05/2024 4.8  3.4 - 5.3 mmol/L Final    Chloride 08/05/2024 105  98 - 107 mmol/L Final    Carbon Dioxide (CO2) 08/05/2024 25  22 - 29 mmol/L Final    Anion Gap 08/05/2024 10  7 - 15 mmol/L Final    Urea Nitrogen 08/05/2024 14.7  6.0 - 20.0 mg/dL Final    Creatinine 08/05/2024 1.23 (H)  0.51 - 0.95 mg/dL Final    GFR Estimate 08/05/2024 63  >60 mL/min/1.73m2 Final    eGFR calculated using 2021 CKD-EPI equation.    Calcium 08/05/2024 9.5  8.8 - 10.4 mg/dL Final    Reference intervals for this test were updated on 7/16/2024 to reflect our healthy population more accurately. There may be differences in the flagging of prior results with similar values performed with this method. Those prior results can be interpreted in the context of the updated reference intervals.    Glucose 08/05/2024 90  70 - 99 mg/dL Final    Creatinine Urine mg/dL 08/05/2024 23.5  mg/dL Final    The reference ranges have not been established in urine creatinine. The results should be integrated into the clinical context for interpretation.    Albumin Urine mg/L 08/05/2024 <12.0  mg/L Final    The reference ranges have not been established in urine albumin. The results should be integrated into the clinical context for interpretation.    Albumin Urine mg/g Cr 08/05/2024    Final    Unable to calculate, urine albumin and/or urine creatinine is outside detectable limits.  Microalbuminuria is defined as an  albumin:creatinine ratio of 17 to 299 for males and 25 to 299 for females. A ratio of albumin:creatinine of 300 or higher is indicative of overt proteinuria.  Due to biologic variability, positive results should be confirmed by a second, first-morning random or 24-hour timed urine specimen. If there is discrepancy, a third specimen is recommended. When 2 out of 3 results are in the microalbuminuria range, this is evidence for incipient nephropathy and warrants increased efforts at glucose control, blood pressure control, and institution of therapy with an angiotensin-converting-enzyme (ACE) inhibitor (if the patient can tolerate it).      TSH 08/05/2024 1.02  0.30 - 4.20 uIU/mL Final    WBC Count 08/05/2024 6.4  4.0 - 11.0 10e3/uL Final    RBC Count 08/05/2024 4.65  3.80 - 5.20 10e6/uL Final    Hemoglobin 08/05/2024 14.3  11.7 - 15.7 g/dL Final    Hematocrit 08/05/2024 43.8  35.0 - 47.0 % Final    MCV 08/05/2024 94  78 - 100 fL Final    MCH 08/05/2024 30.8  26.5 - 33.0 pg Final    MCHC 08/05/2024 32.6  31.5 - 36.5 g/dL Final    RDW 08/05/2024 11.5  10.0 - 15.0 % Final    Platelet Count 08/05/2024 216  150 - 450 10e3/uL Final    % Neutrophils 08/05/2024 51  % Final    % Lymphocytes 08/05/2024 37  % Final    % Monocytes 08/05/2024 7  % Final    % Eosinophils 08/05/2024 5  % Final    % Basophils 08/05/2024 1  % Final    % Immature Granulocytes 08/05/2024 0  % Final    Absolute Neutrophils 08/05/2024 3.3  1.6 - 8.3 10e3/uL Final    Absolute Lymphocytes 08/05/2024 2.4  0.8 - 5.3 10e3/uL Final    Absolute Monocytes 08/05/2024 0.4  0.0 - 1.3 10e3/uL Final    Absolute Eosinophils 08/05/2024 0.3  0.0 - 0.7 10e3/uL Final    Absolute Basophils 08/05/2024 0.1  0.0 - 0.2 10e3/uL Final    Absolute Immature Granulocytes 08/05/2024 0.0  <=0.4 10e3/uL Final           Signed Electronically by: Justin Quinones MD

## 2024-08-22 NOTE — Clinical Note
Doing fine at this point.  She does need annual creatinine, EGFR, urine albumin creatinine ratio.  I am happy to see in the future, though do not think that she needs annual renal follow-up at this point.  Future family planning should be carefully monitored given her reduced GFR and increased relative risk for preeclampsia

## 2024-08-23 NOTE — ASSESSMENT & PLAN NOTE
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 Moy through [facility]  -Previously with iothalmate based direct GFR measurements historically in the 60s  -eGFR, creatinine based; 60 to 70 mL/min; baseline serum creatinine 1.2    2024: Referred to me for CKD evaluation; no follow-up with pediatric nephrology since age 18  -Stable overall GFR; absence of any albuminuria  -Discussed implications of her CKD based on low renal mass   -Importance of continued annual GFR, urine albumin creatinine ratio surveillance  -Reassuringly, very healthy history otherwise; stressed importance of avoiding other secondary renal disease including avoidance of diabetes, hypertension, general vascular disease  -Did share potential increased risks related to future pregnancy given low GFR, specifically potential risk on increased preeclampsia risk.  Shared that it would be important for her to have general family-planning and would involve an obstetrician from the beginning of planned conception.  Very likely may need to be established with maternal-fetal medicine.  No family-planning at this point; anticipates this in 4 to 6 years

## 2024-12-05 ENCOUNTER — OFFICE VISIT (OUTPATIENT)
Dept: MIDWIFE SERVICES | Facility: CLINIC | Age: 24
End: 2024-12-05
Payer: COMMERCIAL

## 2024-12-05 VITALS
HEART RATE: 76 BPM | BODY MASS INDEX: 26.33 KG/M2 | DIASTOLIC BLOOD PRESSURE: 72 MMHG | WEIGHT: 158 LBS | SYSTOLIC BLOOD PRESSURE: 106 MMHG | HEIGHT: 65 IN

## 2024-12-05 DIAGNOSIS — Z01.419 WELL WOMAN EXAM WITH ROUTINE GYNECOLOGICAL EXAM: ICD-10-CM

## 2024-12-05 DIAGNOSIS — Z23 NEED FOR PROPHYLACTIC VACCINATION AND INOCULATION AGAINST INFLUENZA: Primary | ICD-10-CM

## 2024-12-05 NOTE — NURSING NOTE
Prior to immunization administration, verified patients identity using patient s name and date of birth. Please see Immunization Activity for additional information.     Screening Questionnaire for Adult Immunization    Are you sick today?   No   Do you have allergies to medications, food, a vaccine component or latex?   No   Have you ever had a serious reaction after receiving a vaccination?   No   Do you have a long-term health problem with heart, lung, kidney, or metabolic disease (e.g., diabetes), asthma, a blood disorder, no spleen, complement component deficiency, a cochlear implant, or a spinal fluid leak?  Are you on long-term aspirin therapy?   Yes  Kidney   Do you have cancer, leukemia, HIV/AIDS, or any other immune system problem?   No   Do you have a parent, brother, or sister with an immune system problem?   No   In the past 3 months, have you taken medications that affect  your immune system, such as prednisone, other steroids, or anticancer drugs; drugs for the treatment of rheumatoid arthritis, Crohn s disease, or psoriasis; or have you had radiation treatments?   No   Have you had a seizure, or a brain or other nervous system problem?   Don't Know   During the past year, have you received a transfusion of blood or blood    products, or been given immune (gamma) globulin or antiviral drug?   No   For women: Are you pregnant or is there a chance you could become       pregnant during the next month?   No   Have you received any vaccinations in the past 4 weeks?   No     Immunization questionnaire was positive for at least one answer.  Notified Charlotte Cleary CNM.    Flu and Covid immunizations given  Patient instructed to remain in clinic for 15 minutes afterwards, and to report any adverse reactions.     Screening performed by Melodie Sanz LPN on 12/5/2024 at 4:59 PM.

## 2024-12-09 ENCOUNTER — TRANSCRIBE ORDERS (OUTPATIENT)
Dept: MATERNAL FETAL MEDICINE | Facility: CLINIC | Age: 24
End: 2024-12-09
Payer: COMMERCIAL

## 2024-12-09 DIAGNOSIS — O26.90 PREGNANCY RELATED CONDITION, ANTEPARTUM: ICD-10-CM

## 2024-12-10 LAB
BKR LAB AP GYN ADEQUACY: NORMAL
BKR LAB AP GYN INTERPRETATION: NORMAL
BKR LAB AP HPV REFLEX: NO
BKR LAB AP PREVIOUS ABNORMAL: NORMAL
PATH REPORT.COMMENTS IMP SPEC: NORMAL
PATH REPORT.COMMENTS IMP SPEC: NORMAL
PATH REPORT.RELEVANT HX SPEC: NORMAL

## 2025-01-16 ENCOUNTER — OFFICE VISIT (OUTPATIENT)
Dept: FAMILY MEDICINE | Facility: CLINIC | Age: 25
End: 2025-01-16
Payer: COMMERCIAL

## 2025-01-16 VITALS
BODY MASS INDEX: 26.44 KG/M2 | HEART RATE: 77 BPM | RESPIRATION RATE: 16 BRPM | WEIGHT: 158.7 LBS | DIASTOLIC BLOOD PRESSURE: 74 MMHG | HEIGHT: 65 IN | SYSTOLIC BLOOD PRESSURE: 108 MMHG | OXYGEN SATURATION: 99 % | TEMPERATURE: 98.5 F

## 2025-01-16 DIAGNOSIS — M62.830 BACK MUSCLE SPASM: Primary | ICD-10-CM

## 2025-01-16 RX ORDER — CYCLOBENZAPRINE HCL 5 MG
5 TABLET ORAL
Qty: 14 TABLET | Refills: 0 | Status: SHIPPED | OUTPATIENT
Start: 2025-01-16

## 2025-01-16 ASSESSMENT — PATIENT HEALTH QUESTIONNAIRE - PHQ9
SUM OF ALL RESPONSES TO PHQ QUESTIONS 1-9: 6
10. IF YOU CHECKED OFF ANY PROBLEMS, HOW DIFFICULT HAVE THESE PROBLEMS MADE IT FOR YOU TO DO YOUR WORK, TAKE CARE OF THINGS AT HOME, OR GET ALONG WITH OTHER PEOPLE: NOT DIFFICULT AT ALL
SUM OF ALL RESPONSES TO PHQ QUESTIONS 1-9: 6

## 2025-01-16 NOTE — PROGRESS NOTES
"  Assessment & Plan     Back muscle spasm  Recommend topical diclofenac 4 times daily as needed.  Does not tolerate oral NSAIDs due to kidney concerns.  Can continue exercise Tylenol, heat and gentle stretching.  Offered physical therapy but deferred.  She will let us know if she is interested in PT  - cyclobenzaprine (FLEXERIL) 5 MG tablet; Take 1 tablet (5 mg) by mouth nightly as needed for muscle spasms.          BMI  Estimated body mass index is 26.61 kg/m  as calculated from the following:    Height as of this encounter: 1.645 m (5' 4.75\").    Weight as of this encounter: 72 kg (158 lb 11.2 oz).         Patient Instructions   Recommend trying topical Voltaren gel (diclofenac) over-the-counter up to 4 times daily to affected area.  May also continue Tylenol extra strength 1 to 2 tablets 4 times daily not to exceed 4000 mg in a 24-hour period.  Recommend heat therapy and gentle stretching.  May consider massage therapy as well.  Please let me know if you would like to proceed with physical therapy with the possibility of dry needling.    Subjective   Karen is a 24 year old, presenting for the following health issues:  Shoulder Pain (Lt shoulder pain x 1 month, no injury )        1/16/2025     5:12 PM   Additional Questions   Roomed by MANUELA Nichols     Karen presents today for left side scapular pain onset 1 month ago.  Reports a history of arthritis.  Pain in left shoulder blade area with a burning sensation through the shoulder.  When she has this burning pain her pain is 8 out of 10.  Otherwise there is a consistent, dull throb.  Tried Tylenol, heat and ice without improvement.    Massage maybe helpful  Symptoms have been worsening to the point where even water pressure would be uncomfortable.  Clothing touching that area can be uncomfortable.    She has no injury or fall.  Has not started a new exercise routine.  She does do occasional PCA activity with patient lifting but not often and not preceding the onset of " "this pain.    Sleeping okay on right side.   Sometimes will start to hurt with pressure/sheet      History of Present Illness       Reason for visit:  Shoulder Pain  Symptom onset:  More than a month  Symptoms include:  Pain in left shoulder around should blade. Almost constant throbbing with occasional spikes of sharp pain. Occasional feeling of burning sensation  Symptom intensity:  Moderate  Symptom progression:  Worsening  Had these symptoms before:  No  What makes it worse:  Movement of shoulder She is missing 2 dose(s) of medications per week.  She is not taking prescribed medications regularly due to remembering to take.                 Review of Systems  Constitutional, HEENT, cardiovascular, pulmonary, msk, neuro systems are negative, except as otherwise noted.      Objective    /74 (BP Location: Right arm, Patient Position: Sitting, Cuff Size: Adult Regular)   Pulse 77   Temp 98.5  F (36.9  C) (Tympanic)   Resp 16   Ht 1.645 m (5' 4.75\")   Wt 72 kg (158 lb 11.2 oz)   SpO2 99%   BMI 26.61 kg/m    Body mass index is 26.61 kg/m .  Physical Exam  Musculoskeletal:      Right shoulder: Normal.      Left shoulder: Normal.      Thoracic back: Spasms and tenderness present.        Back:       Comments: Pain in the left trapezius with forward flexion and left lateral rotation.Pain medial aspect of scapula with slight swelling and muscle spasm.       Neurological:      Mental Status: She is oriented to person, place, and time.      Sensory: Sensation is intact.                    Signed Electronically by: ABNER Yoon CNP    "

## 2025-01-16 NOTE — PATIENT INSTRUCTIONS
Recommend trying topical Voltaren gel (diclofenac) over-the-counter up to 4 times daily to affected area.  May also continue Tylenol extra strength 1 to 2 tablets 4 times daily not to exceed 4000 mg in a 24-hour period.  Recommend heat therapy and gentle stretching.  May consider massage therapy as well.  Please let me know if you would like to proceed with physical therapy with the possibility of dry needling.

## 2025-01-27 ENCOUNTER — OFFICE VISIT (OUTPATIENT)
Dept: MATERNAL FETAL MEDICINE | Facility: CLINIC | Age: 25
End: 2025-01-27
Attending: MIDWIFE
Payer: COMMERCIAL

## 2025-01-27 DIAGNOSIS — O26.90 PREGNANCY RELATED CONDITION, ANTEPARTUM: ICD-10-CM

## 2025-01-27 DIAGNOSIS — Z82.79 FAMILY HISTORY OF CONGENITAL OR GENETIC CONDITION: Primary | ICD-10-CM

## 2025-01-27 PROCEDURE — 96041 GENETIC COUNSELING SVC EA 30: CPT | Performed by: GENETIC COUNSELOR, MS

## 2025-01-27 NOTE — PROGRESS NOTES
[facility]  Genetic Counseling Consult    Patient:  Shelbi Johnson  Preferred Name: Karen YOB: 2000   Date of Service:  25   MRN: [id number]    Karen was seen at the [facility] for preconception genetic consultation. The indication for genetic counseling is  partner with arthropyosis multiplex congenita  . The patient was accompanied to this visit by their partner, Harjinder.    The session was conducted in English.      IMPRESSION/ PLAN   1. Karen had a genetic counseling visit regarding the indication of partner with arthrogryposis.     2. Karen has a complex mental and physical health history. 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 She was recently seen by internal medicine (after not following with nephrology since the age of 18). Per the recent (2024) documentation, Karen had low rean mass and low GFR. Per that documentation and recommended during our consult discussion, Karen needs a MFM joint nephrology preconception consult. Please place the referral for the MFM/nephrology consult. Karen is aware these preconception MFM consults are scheduling into 2025.     3. Harjinder is scheduled to see general genetics in 2025. This evaluation and testing needs to occur to identify his causative gene and variant. Arthrogryposis can be causes be several different genes and inheritance patterns so his results will clarify the chance he could pass the genetic cause to his child. If his genetic variant was known we discussed in vitro fertilization with preimplantation genetic testing as an option to select embryos that do not have the genetic change.   PREGNANCY HISTORY   /Parity: G0  Karen and Harjinder are thinking of conceiving in 2-3 years. She currently has an IUD    MEDICAL HISTORY   Shelbi s reported medical history is not  "expected to impact pregnancy management or risks to fetal development.       FAMILY HISTORY   A three-generation pedigree was obtained today and is scanned under the \"Media\" tab in Epic. The family history was reported by Shelbi and their partner.    The following significant findings were reported today:   Karen has stage II kidney disease due to her mother's  hemorrhage. This lead Karen to have a \"full arrest at birth\" and as a result her \"kidneys did not function until 3 weeks old and did not grow until 5 months\". She was recently told her kidneys togetherfunction at 60% of one typical kidney. She has no concerns for other organs. She is unsure if she ovulates but recalls menarche around . She does have significant sensitivity to fatigue, temperature differences, and hydration. There is no family history of renal disease.  Karen has one healthy brother. Her parents also had three miscarriages. Although they conceived her brother spontaneously they did need IVF to conceive Karen.   Karen has one paternal first cousin that  of SIDS  Family history such as stillbirths, infant deaths, or pregnancy complications can be difficult to assess if this occurred many decades ago since details are typically scarce. Therefore, it is challenging to assess if this family history modifies risks to the current pregnancy. This is often unlikely, especially if the history is several generations away from the current pregnancy. If more information is collected regarding this family history it should be revisited.     Karen's partner, Harjinder, 25, was born full term by c section. There were no complications in that pregnancy to Harjinder's knowledge. He was diagnosed with arthrogryposis multiplex congenita. There are no concerns about his organs and he meet developmental milestones. He has never had genetic testing to his knowledge. His family history is unremarkable. There is no family history of arthrogryposis or " contractures  Arthrogryposis multiplex congenita is a condition of multiple joint contractures. This causes many joints to be unable to fully or partially extend or bend. Causes include genetic conditions or complications during the pregnancy such as low amniotic fluid.   In some individuals (up to 30%) with arthrogryposis a genetic cause is not found. If a genetic cause is found it can be associated with the following inheritance patterns:  Autosomal dominant: This would mean the genetic change is affecting one of his two copies of the causative gene. This would be de dashawn or a new variant, not inherited from his healthy parents. In this case there would be a 50% chance that he would pass this variant to his child  Autosomal recessive: This would mean the genetic change is affecting both of his two copies of the causative gene. This would mean he inherited one variant from each of his healthy parents. In this case there would be a low concern for recurrence in his children, unless Karen happened to also be a carrier of the condition. If his causative gene is found then Karen could be tested to see if she is a carrier. If she is a carrier, then there would be a 25% chance their children would have the condition. If she is not a carrier, then there children would all be carriers for the condition but would not be affected  X-linked recessive: This would mean the genetic is affecting is one and only copy of the causative gene on the X chromosome. He would have inherited this X chromosome from his mother, who would be a healthy carrier of the condition. This seems unlikely since he does not have any family history of other affected males (maternal uncles, first cousins) but it is still possible. In this case, all of his daughters would be healthy carriers and he could not pass the variant to his sons (since he would give them his Y chromosomes). So this inheritance pattern would have low risks  Mitochondrial: This  inheritance is rare but mitochondria is always maternally inherited. Therefore, there would be no concern about Harjinder passing the variant to his child. Mitochondrial genetic testing can be complicated.     Otherwise, the reported family history is unremarkable for multiple miscarriages, stillbirths, birth defects, intellectual disabilities, known genetic conditions, and consanguinity.       RISK ASSESSMENT FOR INHERITED CONDITIONS AND CARRIER SCREENING OPTIONS   Expanded carrier screening is available to screen for autosomal recessive conditions and X-linked conditions in a large list of genes. Carrier screening does not test the pregnancy but gives a risk assessment for the pregnancy and future pregnancies to have the condition. Expanded carrier screening is designed to identify carrier status for conditions that are primarily childhood or adolescent onset. Expanded carrier screening does not evaluate for adult-onset conditions such as hereditary cancer syndromes, dementia/ Alzheimer's disease, or cardiovascular disease risk factors. Additionally, expanded carrier screening is not comprehensive for all known genetic diseases or inherited conditions. Carrier screening does not test for all genetic and health conditions or risk factors.     Autosomal recessive conditions happen when a mutation has been inherited from the egg and sperm and include conditions like cystic fibrosis, thalassemia, hearing loss, spinal muscular atrophy, and more. We reviewed that when both biological parents carry a harmful genetic change in a gene associated with autosomal recessive inheritance, each of their pregnancies has a 1 in 4 (25%) chance to be affected by that condition. X-linked conditions happen when a mutation has been inherited from the egg and include conditions like fragile X syndrome.With x-linked conditions, the specific risk generally depends on the chromosomal sex of the fetus, with XY individuals (generally male) being  most severely affected.     We talked about carrier screening briefly. They may be interested in the future but would like to understand genetic risks related to Harjinder's conditions first.     GENETIC TESTING OPTIONS   Genetic testing during a pregnancy includes screening and diagnostic procedures.      Screening tests are non-invasive which means no risk to the pregnancy and includes ultrasounds and blood work. The benefits and limitations of screening were reviewed. Screening tests provide a risk assessment (chance) specific to the pregnancy for certain fetal chromosome abnormalities but cannot definitively diagnose or exclude a fetal chromosome abnormality. Follow-up genetic counseling and consideration of diagnostic testing is recommended with any abnormal screening result. Diagnostic testing during a pregnancy is more certain and can test for more conditions. However, the tests do have a risk of miscarriage that requires careful consideration. These tests can detect fetal chromosome abnormalities with greater than 99% certainty. Results can be compromised by maternal cell contamination or mosaicism and are limited by the resolution of current genetic testing technology.     There is no screening or diagnostic test that detects all forms of birth defects or intellectual disability.     We discussed the following diagnostic options which would be helpful if Harjinder's genetic variant was known:     Chorionic villus sampling (CVS)  Invasive diagnostic procedure done between 10w0d and 13w6d  The procedure collects a small sample from the placenta for the purpose of chromosomal testing and/or other genetic testing  Diagnostic result; more than 99% sensitivity for fetal chromosome abnormalities  Cannot screen for open neural tube defects, maternal serum AFP after 15 weeks is recommended    Amniocentesis  Invasive diagnostic procedure done after 15 weeks gestation  The procedure collects a small sample of amniotic fluid  for the purpose of chromosomal testing and/or other genetic testing  Diagnostic result; more than 99% sensitivity for fetal chromosome abnormalities  Testing for AFP in the amniotic fluid can test for open neural tube defects    It was a pleasure to be involved with Sehlbi nassar care. I spent 45 minutes on the date of the encounter doing chart review, obtaining history, test coordination, documentation, and further activities as noted above.    Katherine Cat GC, MS, [facility]  Board Certified and [place] Licensed Genetic Counselor  [facility]  Maternal Fetal Medicine  Office: [phone]  [facility]: [phone]   Fax: [phone]  [facility]

## 2025-01-28 DIAGNOSIS — F44.4 CONVERSION DISORDER WITH ABNORMAL MOVEMENT: ICD-10-CM

## 2025-01-28 DIAGNOSIS — Z13.71 TESTING FOR GENETIC DISEASE CARRIER STATUS: ICD-10-CM

## 2025-01-28 DIAGNOSIS — N18.2 CHRONIC KIDNEY DISEASE, STAGE II (MILD): Primary | ICD-10-CM

## 2025-02-01 ENCOUNTER — HEALTH MAINTENANCE LETTER (OUTPATIENT)
Age: 25
End: 2025-02-01

## 2025-02-04 DIAGNOSIS — N18.2 CHRONIC KIDNEY DISEASE, STAGE II (MILD): Primary | ICD-10-CM

## 2025-02-04 DIAGNOSIS — Z82.79 FAMILY HISTORY OF CONGENITAL OR GENETIC CONDITION: ICD-10-CM

## 2025-03-21 ENCOUNTER — HOSPITAL ENCOUNTER (OUTPATIENT)
Dept: ULTRASOUND IMAGING | Facility: HOSPITAL | Age: 25
Discharge: HOME | End: 2025-03-21
Attending: OBSTETRICS & GYNECOLOGY | Admitting: OBSTETRICS & GYNECOLOGY
Payer: COMMERCIAL

## 2025-03-21 DIAGNOSIS — Z31.69 ENCOUNTER FOR PRECONCEPTION CONSULTATION: ICD-10-CM

## 2025-03-21 PROCEDURE — 76830 TRANSVAGINAL US NON-OB: CPT

## 2025-03-21 PROCEDURE — 76377 3D RENDER W/INTRP POSTPROCES: CPT

## 2025-04-29 ENCOUNTER — E-VISIT (OUTPATIENT)
Dept: FAMILY MEDICINE | Facility: CLINIC | Age: 25
End: 2025-04-29
Payer: COMMERCIAL

## 2025-04-29 DIAGNOSIS — R51.9 NONINTRACTABLE HEADACHE, UNSPECIFIED CHRONICITY PATTERN, UNSPECIFIED HEADACHE TYPE: Primary | ICD-10-CM

## 2025-05-05 ENCOUNTER — PRE VISIT (OUTPATIENT)
Dept: MATERNAL FETAL MEDICINE | Facility: CLINIC | Age: 25
End: 2025-05-05
Payer: COMMERCIAL

## 2025-05-05 NOTE — PROGRESS NOTES
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 She and her partner are interested in pursuing pregnancy within the next 2 years. Her partner notably has a history of arthrogryposis multiplex congenita and they are awaiting genetic counseling/testing visit in 2025 to determine specific heritability of his condition which will ultimately determine whether they pursue spontaneous conception vs IUI vs IVF.      Her kidney disease stems from acute process at time of birth, with  hemorrhage requiring massive transfusion and leading to susspected acute tubular necrosis. Since then she has had persistent renal atrophy with renal mass historically in the ~5th percentile on serial growth ultrasounds. Follows with Dr Moy for nephrology  She follows routinely with nephrology for her CKD. Prior to today she was most recently seen in 2024 at which time she had stable laboratory evaluation and creatinine was at baseline (~1.2). She was seen by Dr Zamora with [facility] along with her MFM consultation today.     Her medical history is additionally notable for intractable migraines without aura that are precipitated by lack of sleep. Notes that they are debilitating when they occur and are associated with photosensitivity, dizziness, and nausea. Reports that they often resolve after she is able to sleep or vomits. Has previously been seen by neurology and thinks that she had head imaging, which did not reveal contributory etiology. She does not take any medication when these episodes occur, noting that she has a high Tylenol tolerance and has no other prescribed medications.     Also has history of PTSD with  conversion disorder, previously followed with psychology but discontinued as there were minimal additional treatment options aside from self-driven meditation when episodes occur. No mood concerns today. Surgical history notable for an uncomplicated knee arthroscopy, no history of adverse reactions to anesthesia.      OB History    Para Term  AB Living   0 0 0 0 0 0   SAB IAB Ectopic Multiple Live Births   0 0 0 0 0       Gynecologic History   - Denies any history of abnormal pap smears  - Denies prior cervical surgery or procedures  - Denies any history of frequent UTIs, vaginal infections, or STIs    Past Medical History     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    Depressive disorder     Fracture 2019    wrist    Term birth of female      38 weeks gestation, BW 7lbs 5oz, complicated by placental abruption and urgent , hypovolemia due to maternal fetal hemorrage       Past Surgical History     Past Surgical History:   Procedure Laterality Date    KNEE ARTHROSCOPY W/ PLICA EXCISION Right 11/15/2013    Right lateral release and removal of plica, [facility], [place], [place]    TONSILLECTOMY  2017    For recurrent tonsillitis    TONSILLECTOMY Bilateral 2017       Medication List     Prior to Admission medications    Medication Sig Last Dose Taking? Auth Provider Long Term End Date   Albuterol Sulfate (PROAIR HFA IN) Inhale into the lungs as needed.   Reported, Patient     Ascorbic Acid (VITAMIN C PO) Take by mouth as needed.   Reported, Patient     cyclobenzaprine (FLEXERIL) 5 MG tablet Take 1 tablet (5 mg) by mouth nightly as needed for muscle spasms.   Chio Hagen, ABNER CNP     levonorgestrel (MIRENA) 20 MCG/24HR IUD 1 each by Intrauterine route. Mirena IUD placed 2018   Reported, Patient Yes    Multiple Vitamin (MULTIVITAMIN PO)    Reported,  Patient         Allergies   Nsaids    Social History   Denies use of alcohol, drugs or smoking.    Family History     Specifically, denies family history of motor/intellectual impairment, genetic or chromosome abnormalities or congenital anomalies. Anticipated        Physical Exam   /77 (BP Location: Left arm, Patient Position: Chair, Cuff Size: Adult Regular)   Pulse 72   Resp 18   SpO2 100%     Gen: NAD  CV: Regular rate    Resp: Breathing non-labored    Abd: non-tender  Ext: No edema    Labs and Other Pertinent Evaluation        US Pelvic Transvaginal,  3/21/2025   FINDINGS:   UTERUS: Measures 6.1 x 3.9 x 3.5 cm. Mildly heterogenous uterine parenchyma. No uterine masses.  ENDOMETRIUM: 2 mm. An IUD is visualized in appropriate position within the endometrial cavity.  RIGHT OVARY: Measures 4.0 x 2.1 x 2.1 cm. Normal.  LEFT OVARY: Measures 3.3 x 2.5 x 2.1 cm. Normal.  Small amount of free fluid in the pelvis, indeterminate, could be physiological.     IMPRESSION:  1.  An IUD is visualized in appropriate position within the endometrial cavity.  2.  Ovaries are visualized and appear unremarkable. No suspicious adnexal masses.  3.  Small amount of free fluid in the pelvis, indeterminate, could be physiological.       Assessment/Counseling     Shelbi Johnson is a 24 year old  here for MFM pre-conception consultation regarding stage II chronic kidney disease secondary to acute medullary necrosis in infancy.    # Stage II CKD   Pregnancy may have significant negative effects on renal disease and can change kidney function both temporarily and permanently.  Increases in proteinuria occur in about 50% of pregnancies, hypertension develops or worsens in about 25% of pregnancies which may lead to maternal injury such as stroke, or poor fetal outcome.  Women with nephrotic syndrome may develop worsening edema as the pregnancy progresses, however this should resolve with delivery.  Permanent decline in renal  function occurs in up to 10% of pregnancies affected by mild renal disease (creatinine <1.5 mg/dL); women with hypertension seem to be at increased risk.  In women with moderate renal insufficiency (creatinine 1.5-2.9 mg/dL), as many as one third experience irreversible damage, and as many as 10% may develop end-stage renal disease (ESRD).  Co-existing hypertension will increase this likelihood.      Pregnancies in women with preexisting renal disease are significantly more likely to develop gestational hypertension, preeclampsia, eclampsia, or to die.   Increased risk of poor fetal outcomes such as  delivery, fetal growth restriction, and fetal death are significant.  Again, these risks are increased in women with uncontrolled hypertension.      # Migraines   We discussed migraines  in pregnancy can be unpredictable. We discussed some patients experience decreased migraine frequency, other experience increased, and others still notice no change in frequency. However, the management options for migraine becomes more limited in pregnancy. Generally Tylenol is the recommended first line treatment; the patient reports she has does not experience much relief with this medication, as she had to take frequently throughout her lifetime as she avoids NSAIDs in setting of CKD. We did review that although -triptan class of medication are not usually first line in pregnancy, these medications can be used in pregnancy and would be a reasonable option for management if this is what is ultimately deemed most appropriate by a neurologist. We recommend establishing with a neurologist for further evaluation of her headache symptoms and management recommendations.     Recommendations     Preconception Recommendations:   -Specific Preconception Considerations:   - Continue close follow up with nephrology prior to conception   - Updated urine protein:creatine ratio to establish delma-conception baseline    - Repeat renal  ultrasound to assess renal size and follow-up previously identified renal cyst   - Consider establishing with neurology for migraine management and surveillance, additional workup as indicated     General Preconception Health Optimization:   -Start Prenatal Vitamin  -Ensure Vaccinations up to date  -Establish healthy habits including healthy diet, regular physical activity and sleep.   -Consider FR[facility] referral and follow-up pending genetics results and patient/partner ultimate preference regarding genetic paternity of future pregnancy      Genetic screening  - Genetic counseling visit schedule 2025, awaiting partner results in setting of arthrogryposis multiplex congenita    Specific Pregnancy Recommendations:   Maternal antepartum management  -Recommend establishing OB care early in pregnancy.  -Anticipate primary OB care with Madiha,   -MFM consultation in pregnancy recommended   - Early detection and treatment of asymptomatic bacteriuria with urine culture at least every trimester  - Increased frequency of prenatal visits: every 2-4 weeks until the third trimester, then weekly thereafter  - Assessment of renal function every 4 weeks throughout pregnancy, increasing to every 2 weeks   - Monitor for signs and symptoms of preeclampsia    Medications Considerations   - Begin low dose aspirin 81mg at 12 weeks gestation for preeclampsia prophylaxis     Ultrasound surveillance:   - Early ultrasound to confirm dating  -First trimester screening ultrasound with genetic counseling   -Detailed US with MFM at 18-20 weeks  - Fetal echocardiogram at 22-24 weeks if IVF pregnancy   -Growth Ultrasounds every 4 weeks starting at 28 weeks  - Weekly  surveillance not indicated at this time, although this recommendation would change    - IF IVF pregnancy -- weekly antental testing beginning at 26 weeks     Timing and mode of delivery  - Delivery is indicated based on secondary sequelae (e.g. hypertension, preeclampsia, fetal  growth restriction, fetal distress) and underlying disease; generally 37 to 39 weeks  gestation so as to avoid irreversible renal injury  -  reserved for obstetric indications    The patient was seen and evaluated with Dr. Lissy Johnson.    At the end of our discussion, Shelbi Johnson indicated that her questions were answered and she seemed satisfied with our discussion.  Thank you for allowing us to participate in the care of your patient. Please do not hesitate to contact us if you have further questions regarding the management of your patient.     Tho Cuellar MD   Obstetrics & Gynecology, PGY-2  2025 11:13 AM

## 2025-05-08 ENCOUNTER — APPOINTMENT (OUTPATIENT)
Dept: LAB | Facility: CLINIC | Age: 25
End: 2025-05-08
Attending: STUDENT IN AN ORGANIZED HEALTH CARE EDUCATION/TRAINING PROGRAM
Payer: COMMERCIAL

## 2025-05-08 ENCOUNTER — OFFICE VISIT (OUTPATIENT)
Dept: MATERNAL FETAL MEDICINE | Facility: CLINIC | Age: 25
End: 2025-05-08
Attending: STUDENT IN AN ORGANIZED HEALTH CARE EDUCATION/TRAINING PROGRAM
Payer: COMMERCIAL

## 2025-05-08 VITALS
OXYGEN SATURATION: 100 % | SYSTOLIC BLOOD PRESSURE: 112 MMHG | DIASTOLIC BLOOD PRESSURE: 77 MMHG | RESPIRATION RATE: 18 BRPM | HEART RATE: 72 BPM

## 2025-05-08 DIAGNOSIS — N18.2 CHRONIC KIDNEY DISEASE, STAGE II (MILD): ICD-10-CM

## 2025-05-08 DIAGNOSIS — Z82.79 FAMILY HISTORY OF CONGENITAL OR GENETIC CONDITION: ICD-10-CM

## 2025-05-08 LAB
ALBUMIN MFR UR ELPH: <6 MG/DL
ALBUMIN SERPL BCG-MCNC: 4.3 G/DL (ref 3.5–5.2)
ALBUMIN UR-MCNC: NEGATIVE MG/DL
ANION GAP SERPL CALCULATED.3IONS-SCNC: 13 MMOL/L (ref 7–15)
APPEARANCE UR: CLEAR
BILIRUB UR QL STRIP: NEGATIVE
BUN SERPL-MCNC: 14.8 MG/DL (ref 6–20)
CALCIUM SERPL-MCNC: 9.4 MG/DL (ref 8.8–10.4)
CHLORIDE SERPL-SCNC: 106 MMOL/L (ref 98–107)
COLOR UR AUTO: NORMAL
CREAT SERPL-MCNC: 1.2 MG/DL (ref 0.51–0.95)
CREAT UR-MCNC: 91.7 MG/DL
EGFRCR SERPLBLD CKD-EPI 2021: 65 ML/MIN/1.73M2
ERYTHROCYTE [DISTWIDTH] IN BLOOD BY AUTOMATED COUNT: 12.1 % (ref 10–15)
GLUCOSE SERPL-MCNC: 82 MG/DL (ref 70–99)
GLUCOSE UR STRIP-MCNC: NEGATIVE MG/DL
HCO3 SERPL-SCNC: 24 MMOL/L (ref 22–29)
HCT VFR BLD AUTO: 43.6 % (ref 35–47)
HGB BLD-MCNC: 14.8 G/DL (ref 11.7–15.7)
HGB UR QL STRIP: NEGATIVE
KETONES UR STRIP-MCNC: NEGATIVE MG/DL
LEUKOCYTE ESTERASE UR QL STRIP: NEGATIVE
MCH RBC QN AUTO: 31.8 PG (ref 26.5–33)
MCHC RBC AUTO-ENTMCNC: 33.9 G/DL (ref 31.5–36.5)
MCV RBC AUTO: 94 FL (ref 78–100)
NITRATE UR QL: NEGATIVE
PH UR STRIP: 6.5 [PH] (ref 5–7)
PHOSPHATE SERPL-MCNC: 2.9 MG/DL (ref 2.5–4.5)
PLATELET # BLD AUTO: 197 10E3/UL (ref 150–450)
POTASSIUM SERPL-SCNC: 4.1 MMOL/L (ref 3.4–5.3)
PROT/CREAT 24H UR: NORMAL MG/G{CREAT}
RBC # BLD AUTO: 4.66 10E6/UL (ref 3.8–5.2)
SODIUM SERPL-SCNC: 143 MMOL/L (ref 135–145)
SP GR UR STRIP: 1.01 (ref 1–1.03)
UROBILINOGEN UR STRIP-MCNC: NORMAL MG/DL
WBC # BLD AUTO: 6 10E3/UL (ref 4–11)

## 2025-05-08 PROCEDURE — 99213 OFFICE O/P EST LOW 20 MIN: CPT | Performed by: INTERNAL MEDICINE

## 2025-05-08 PROCEDURE — 85014 HEMATOCRIT: CPT | Performed by: STUDENT IN AN ORGANIZED HEALTH CARE EDUCATION/TRAINING PROGRAM

## 2025-05-08 PROCEDURE — 84156 ASSAY OF PROTEIN URINE: CPT | Performed by: STUDENT IN AN ORGANIZED HEALTH CARE EDUCATION/TRAINING PROGRAM

## 2025-05-08 PROCEDURE — 81003 URINALYSIS AUTO W/O SCOPE: CPT | Performed by: STUDENT IN AN ORGANIZED HEALTH CARE EDUCATION/TRAINING PROGRAM

## 2025-05-08 PROCEDURE — 82310 ASSAY OF CALCIUM: CPT | Performed by: STUDENT IN AN ORGANIZED HEALTH CARE EDUCATION/TRAINING PROGRAM

## 2025-05-08 PROCEDURE — 36415 COLL VENOUS BLD VENIPUNCTURE: CPT | Performed by: STUDENT IN AN ORGANIZED HEALTH CARE EDUCATION/TRAINING PROGRAM

## 2025-05-08 ASSESSMENT — PAIN SCALES - GENERAL: PAINLEVEL_OUTOF10: NO PAIN (0)

## 2025-05-08 NOTE — PATIENT INSTRUCTIONS
It was a pleasure taking care of you today.  I've included a brief summary of our discussion and care plan from today's visit below.  Please review this information with your primary care provider.     My recommendations are summarized as follows:  -Today we ordered labs(blood and urine)  -Please schedule a kidney Ultrasound  -Please reach out when you are pregnant. Aspirin is recommended starting at 12 weeks of gestation    Who do I call with any questions after my visit?  Please be in touch if there are any further questions that arise following today's visit.  There are multiple ways to contact your nephrology care team.       During business hours, you may reach your Nephrology Care Team or schedule or reschedule an appointment or lab at [phone].       If you need to schedule imaging, please call [phone].   To schedule a COVID test, please call [phone].     You can always send a secure message through [employer]. [employer] messages are answered by your nurse or doctor typically within 24-48 hours. Please allow extra time on weekends and holidays.       For urgent/emergent questions after business hours, you may reach the on-call Nephrology Fellow by contacting the [facility]  at [phone].     How will I get the results of any tests ordered?    You will receive all of your results.  If you have signed up for [employer], any tests ordered at your visit will be available to you once resulted on [employer]. Typically the physician reviews them and may or may not make further recommendations. If there are urgent results that require a change in your care plan, your physician or nurse will call you to discuss the next steps. If you are not on [employer], a letter may be generated and mailed to you with your results.

## 2025-05-08 NOTE — NURSING NOTE
Karen seen in clinic today for preconception MFM and nephrology consults due to hx Stage II CKD  (see report/notes). VSS. Medications reviewed. Pt met with Dr. Zamora and Dr. ELLI Johnson and Dr. Salmon. Plan to labs today. Pt discharged stable and ambulatory.     Jocelyn Dick RN

## 2025-05-08 NOTE — PROGRESS NOTES
I was asked to see this patient by:  Dr. 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  She is here today with her fiancé.  She was followed earlier in the pediatric nephrology clinic[dr. Moy, Dr. Joy].  She works as an .    She was born full-term.  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  Her most recent kidney ultrasound in 2016 show bilateral small kidneys.  Her left kidney measures 7.9 cm and her right kidney measures 9.1 cm in 2016.  Her growth otherwise was at goal pace.    Her other medical problems include depression and conversion disorder manifested as intermittent generalized muscle weakness.  She was admitted twice for that.  She reports that this is related to a previous PTSD.  Currently she is doing much better with this occurring once every 5 to 6 months.  She also had left lateral knee displacement and sprained ligament during playing sports and underwent 2 surgeries on her knees.    Overall she feels well.  She has no particular complaints today.  She complains of insomnia at times.  She denies any lower extremity edema, urinary infections, gross hematuria, shortness of breath or pain.  She is interested in in getting pregnant in ~2 years.  Her  to be has arthrogryposis multiplex congenita.  He is scheduled to be seen by genetics in 2025 to understand his genetic risk to transmit his genetic disease to the offsprings. If that risk is high, They might consider to go for IVF with a donor sperm.    No family history of kidney disease    Obstetric history: L0    Social history: She is engaged to Harjinder Clark.  They met on campus.  She studied finance. He studied music and psychology.  She works as an .  She had some stresses in her previous  job so she moved to another job and she is happy about it.  She has 1 older brother who lives in [place] ([place]).  Her parents moved to [place].  She does not smoke.  She drinks occasionally.    Allergies   Allergen Reactions    Nsaids Other (See Comments)     Pt has kidney concerns, cannot take NSAIDs per pt  Pt has kidney concerns, cannot take NSAIDs per pt       Current Outpatient Medications   Medication Sig Dispense Refill    Albuterol Sulfate (PROAIR HFA IN) Inhale into the lungs as needed.      Ascorbic Acid (VITAMIN C PO) Take by mouth as needed.      cyclobenzaprine (FLEXERIL) 5 MG tablet Take 1 tablet (5 mg) by mouth nightly as needed for muscle spasms. 14 tablet 0    levonorgestrel (MIRENA) 20 MCG/24HR IUD 1 each by Intrauterine route. Mirena IUD placed 2018      Multiple Vitamin (MULTIVITAMIN PO)        No current facility-administered medications for this visit.       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    Fracture 2019    wrist    Term birth of female      38 weeks gestation, BW 7lbs 5oz, complicated by placental abruption and urgent , hypovolemia due to maternal fetal hemorrage       Past Surgical History:   Procedure Laterality Date    KNEE ARTHROSCOPY W/ PLICA EXCISION Right 11/15/2013    Right lateral release and removal of plica, [facility], [place], [place]    TONSILLECTOMY  2017    For recurrent tonsillitis    TONSILLECTOMY Bilateral 2017       Social History     Tobacco Use    Smoking status: Never     Passive exposure: Never    Smokeless tobacco: Never   Vaping Use    Vaping status: Never Used   Substance Use Topics    Alcohol use: Yes     Comment: less than once a month    Drug use: No       Family History   Problem Relation Age of Onset    Kidney Disease No family hx of     Depression Mother     Hyperlipidemia Mother     Osteoarthritis  Mother     Other - See Comments Mother         Spinal stenosis    No Known Problems Father     No Known Problems Brother     Other - See Comments Maternal Grandmother         Spinal stenosis    Paget's disease of bone Maternal Grandmother     Cardiomyopathy Maternal Grandmother         viral    Colon Cancer Maternal Grandmother         Unsure if mass was cancerous or not     Alzheimer Disease Maternal Grandfather     Hypertension Maternal Grandfather     Hyperlipidemia Maternal Grandfather     Transient ischemic attack Maternal Grandfather     Breast Cancer Paternal Grandmother     Cerebrovascular Disease Paternal Grandfather        ROS: A 12 system review of systems was negative other than noted here or above.     Exam:    BP Readings from Last 6 Encounters:   05/08/25 112/77   03/14/25 108/75   01/16/25 108/74   12/05/24 106/72   08/22/24 116/78   08/05/24 102/74     GENERAL APPEARANCE: alert and no distress  EYES: PERRL  HENT: mouth without ulcers or lesions  NECK: supple, no adenopathy  RESP: lungs clear to auscultation - no rales, rhonchi or wheezes  CV: regular rhythm, normal rate, no rub   ABDOMEN:  soft, nontender, no HSM or masses and bowel sounds normal  MS: extremities normal- no gross deformities noted, no evidence of inflammation in joints, no muscle tenderness  SKIN: no rash  NEURO: Normal strength and tone, sensory exam grossly normal, mentation intact and speech normal  PSYCH: mentation appears normal. and affect normal/bright    Results: Reviewed in details with the patient and her fiancé    Assessment/Plan:  #1 nonproteinuric chronic kidney disease stage II  #2 prepregnancy counseling  #3 Depression and conversion disorder    Her chronic kidney disease is secondary to her medullary necrosis at birth with reduction in nephron mass/renal reserve.  Her last kidney ultrasound was in 2019 and showed bilateral small kidneys more so on the left side.  She was on enalapril briefly [reportedly protective  but not related to high blood pressure or proteinuria].  Her current creatinine is at 1.2 mg/dL and it has been relatively stable over the past 3 years.  Her current eGFR is 63 mL per minute.  She does not have any albuminuria.  -Today we will repeat her renal panel as well as a urine protein creatinine ratio.  We will also order a kidney ultrasound to check her kidney size.  - We discussed the risks of pregnancy in woman with kidney disease.  Women with kidney disease are at increased risk of preeclampsia when compared with women without kidney disease [10 times higher].  This risk is increased in the setting of uncontrolled hypertension and proteinuria. Luckily , she doesn't have both. There is also risk of CKD progression with pregnancy but it is low in the early stages of her CKD (<10%)[Valentine 2015].  In addition, there is a slightly increased risk of  delivery and SGA babies.  -Emphasized the importance of blood pressure monitoring during pregnancy.  Goal blood pressure should be less than 140/90  -She was instructed that she needs to be on aspirin  mg starting at 12 weeks of gestation until 36 weeks [Asael 2017].   -Calcium supplementation 1000mg is also recommended at least 3 months prior to conception.    The total time of this encounter amounted to 45 minutes on the day of the encounter 2025. This time included face to face time spent with the patient, preparatory work and chart review, ordering tests, and performing post visit documentation.    *This dictation was prepared in part using Dragon recognition software.  As a result errors may occur. When identified these transcription errors have been corrected.  While every attempt is made to correct errors during dictation, errors may still exist.     Jason Zamora MD      Division of Kidney Disease and Hypertension   [facility]

## 2025-05-12 ENCOUNTER — OFFICE VISIT (OUTPATIENT)
Dept: FAMILY MEDICINE | Facility: CLINIC | Age: 25
End: 2025-05-12
Payer: COMMERCIAL

## 2025-05-12 VITALS
HEIGHT: 65 IN | BODY MASS INDEX: 23.91 KG/M2 | SYSTOLIC BLOOD PRESSURE: 103 MMHG | DIASTOLIC BLOOD PRESSURE: 78 MMHG | OXYGEN SATURATION: 97 % | HEART RATE: 67 BPM | TEMPERATURE: 98.4 F | WEIGHT: 143.5 LBS | RESPIRATION RATE: 16 BRPM

## 2025-05-12 DIAGNOSIS — R11.0 NAUSEA: Primary | ICD-10-CM

## 2025-05-12 DIAGNOSIS — F33.1 MAJOR DEPRESSIVE DISORDER, RECURRENT EPISODE, MODERATE (H): ICD-10-CM

## 2025-05-12 DIAGNOSIS — R51.9 NONINTRACTABLE HEADACHE, UNSPECIFIED CHRONICITY PATTERN, UNSPECIFIED HEADACHE TYPE: ICD-10-CM

## 2025-05-12 DIAGNOSIS — R19.7 DIARRHEA, UNSPECIFIED TYPE: ICD-10-CM

## 2025-05-12 LAB
ALBUMIN SERPL BCG-MCNC: 4.7 G/DL (ref 3.5–5.2)
ALP SERPL-CCNC: 70 U/L (ref 40–150)
ALT SERPL W P-5'-P-CCNC: 14 U/L (ref 0–50)
ANION GAP SERPL CALCULATED.3IONS-SCNC: 12 MMOL/L (ref 7–15)
AST SERPL W P-5'-P-CCNC: 21 U/L (ref 0–45)
BASOPHILS # BLD AUTO: 0 10E3/UL (ref 0–0.2)
BASOPHILS NFR BLD AUTO: 1 %
BILIRUB SERPL-MCNC: 1.3 MG/DL
BUN SERPL-MCNC: 13 MG/DL (ref 6–20)
CALCIUM SERPL-MCNC: 9.6 MG/DL (ref 8.8–10.4)
CHLORIDE SERPL-SCNC: 104 MMOL/L (ref 98–107)
CREAT SERPL-MCNC: 1.1 MG/DL (ref 0.51–0.95)
EGFRCR SERPLBLD CKD-EPI 2021: 72 ML/MIN/1.73M2
EOSINOPHIL # BLD AUTO: 0.3 10E3/UL (ref 0–0.7)
EOSINOPHIL NFR BLD AUTO: 4 %
ERYTHROCYTE [DISTWIDTH] IN BLOOD BY AUTOMATED COUNT: 11.7 % (ref 10–15)
GLUCOSE SERPL-MCNC: 85 MG/DL (ref 70–99)
HCO3 SERPL-SCNC: 23 MMOL/L (ref 22–29)
HCT VFR BLD AUTO: 43 % (ref 35–47)
HGB BLD-MCNC: 14.7 G/DL (ref 11.7–15.7)
IMM GRANULOCYTES # BLD: 0 10E3/UL
IMM GRANULOCYTES NFR BLD: 0 %
LIPASE SERPL-CCNC: 29 U/L (ref 13–60)
LYMPHOCYTES # BLD AUTO: 2.1 10E3/UL (ref 0.8–5.3)
LYMPHOCYTES NFR BLD AUTO: 34 %
MCH RBC QN AUTO: 32 PG (ref 26.5–33)
MCHC RBC AUTO-ENTMCNC: 34.2 G/DL (ref 31.5–36.5)
MCV RBC AUTO: 94 FL (ref 78–100)
MONOCYTES # BLD AUTO: 0.4 10E3/UL (ref 0–1.3)
MONOCYTES NFR BLD AUTO: 6 %
NEUTROPHILS # BLD AUTO: 3.4 10E3/UL (ref 1.6–8.3)
NEUTROPHILS NFR BLD AUTO: 55 %
PLATELET # BLD AUTO: 192 10E3/UL (ref 150–450)
POTASSIUM SERPL-SCNC: 4.5 MMOL/L (ref 3.4–5.3)
PROT SERPL-MCNC: 7.3 G/DL (ref 6.4–8.3)
RBC # BLD AUTO: 4.59 10E6/UL (ref 3.8–5.2)
SODIUM SERPL-SCNC: 139 MMOL/L (ref 135–145)
WBC # BLD AUTO: 6.1 10E3/UL (ref 4–11)

## 2025-05-12 PROCEDURE — 83690 ASSAY OF LIPASE: CPT | Performed by: NURSE PRACTITIONER

## 2025-05-12 PROCEDURE — 86364 TISS TRNSGLTMNASE EA IG CLAS: CPT | Performed by: NURSE PRACTITIONER

## 2025-05-12 PROCEDURE — 85025 COMPLETE CBC W/AUTO DIFF WBC: CPT | Mod: QW | Performed by: NURSE PRACTITIONER

## 2025-05-12 PROCEDURE — 3074F SYST BP LT 130 MM HG: CPT | Performed by: NURSE PRACTITIONER

## 2025-05-12 PROCEDURE — 3078F DIAST BP <80 MM HG: CPT | Performed by: NURSE PRACTITIONER

## 2025-05-12 PROCEDURE — 36415 COLL VENOUS BLD VENIPUNCTURE: CPT | Performed by: NURSE PRACTITIONER

## 2025-05-12 PROCEDURE — 80053 COMPREHEN METABOLIC PANEL: CPT | Performed by: NURSE PRACTITIONER

## 2025-05-12 PROCEDURE — 99214 OFFICE O/P EST MOD 30 MIN: CPT | Performed by: NURSE PRACTITIONER

## 2025-05-12 ASSESSMENT — ENCOUNTER SYMPTOMS
NAUSEA: 1
DIARRHEA: 1
HEADACHES: 1

## 2025-05-12 ASSESSMENT — PATIENT HEALTH QUESTIONNAIRE - PHQ9
SUM OF ALL RESPONSES TO PHQ QUESTIONS 1-9: 5
10. IF YOU CHECKED OFF ANY PROBLEMS, HOW DIFFICULT HAVE THESE PROBLEMS MADE IT FOR YOU TO DO YOUR WORK, TAKE CARE OF THINGS AT HOME, OR GET ALONG WITH OTHER PEOPLE: NOT DIFFICULT AT ALL
SUM OF ALL RESPONSES TO PHQ QUESTIONS 1-9: 5

## 2025-05-12 NOTE — PROGRESS NOTES
Assessment & Plan     Nausea  Nausea without vomiting worse the last 4 days, acidic diarrhea stool once daily, chronic intermittent headaches, no abdominal pain. No known precipitating factors or sick contacts.  Will check lab work as below.  Recommend probiotic and trial of avoiding dairy products X 2 weeks.  Has intolerance to peppermint but could try mindy.  Discussed brain gut axis and encouraged she continue mindfulness and consider meditation pardeep.    Does endorse hypmobile joints, history of knee dislocation.  Mom with hypermobile joints. Constellation of symptoms including headache, fatigue, GI symptoms, anxiety, dizziness and lightheadedness, suggestive of EDS.  Provided EDS society website and we will continue to discuss.   - Comprehensive metabolic panel (BMP + Alb, Alk Phos, ALT, AST, Total. Bili, TP); Future  - Lipase; Future  - CBC with Platelets & Differential; Future  - Tissue transglutaminase sloan IgA and IgG; Future  - Comprehensive metabolic panel (BMP + Alb, Alk Phos, ALT, AST, Total. Bili, TP)  - Lipase  - CBC with Platelets & Differential  - Tissue transglutaminase sloan IgA and IgG    Diarrhea, unspecified type  Diarrhea stool once daily without blood, nausea without vomiting.  History of GI issues, bloating.  Wonders about gluten allergy or intolerance.  Recommend holding dairy x 2 weeks.  Recommend probiotic.  If symptoms persist for over 10 to 14 days, could consider stool culture/stool studies.  Will do lab work as below.  Does not seem to be associated with eating or medications.  Could consider Imodium over the counter if symptoms persist.  - Comprehensive metabolic panel (BMP + Alb, Alk Phos, ALT, AST, Total. Bili, TP); Future  - Lipase; Future  - CBC with Platelets & Differential; Future  - Tissue transglutaminase sloan IgA and IgG; Future  - Comprehensive metabolic panel (BMP + Alb, Alk Phos, ALT, AST, Total. Bili, TP)  - Lipase  - CBC with Platelets & Differential  - Tissue  transglutaminase sloan IgA and IgG    Nonintractable headache, unspecified chronicity pattern, unspecified headache type  She has chronic intermittent headaches.  Improves with follow-up.  Can have nausea, rare vomiting.  Vomiting can help.  Endorses light and sound sensitivity.  Headache is behind the eyes but not one-sided.  Saw neurology in high school without clear diagnosis of migraine.  Could try magnesium but cautioned on side effect of loose stool.  Recommend waiting until diarrhea has resolved.    Major depressive disorder, recurrent episode, moderate (H)  Improved with mindfulness activities.  Discussed relationship of mood and gut.                Subjective   Karen is a 24 year old, presenting for the following health issues:  Nausea (Recurring nausea and diarrhea x 4 days and sensitivity to temperature), Diarrhea (Recurring acidic diarrhea and nausea x 4 days and sensitivity to temperature), and Headache (Discuss migraines)        5/12/2025     3:16 PM   Additional Questions   Roomed by MANUELA Nichols     Nausea X 4 days  Diarrhea very acidic, once daily  No blood in stool  Dealing with headache-migraine on and off for years  Needs 12 hours of sleep nightly   Often dizzy, lightheaded  Cold and heat intolerance  No abdominal pain today, but can have bloating  Checked thyroid and okay  Nausea and dizziness with moving  Last 4 days constant nausea  Denies chance of pregnancy  Blue tinged fingers with cold-noticed since childhood child  No sick contacts  Eating same  No new medication  Activity/execises induced asthma  The warmer she gets, the more nausea  History of chronic nausea for months, eventually went away on own    Migraine, no aura  Head pain  Associated symptoms include nausea and vomiting  Light and sound sensitivity  Hot and cold flashes  Saw neurologist as child, beginning of HS  Sleep helps, vomiting helps X 1    Similar symptoms to mom, mom takes omeprazole daily    Skin burns easily, but no rashes,  "no suns sensivity    When super stressed, freezes/paralysis    Tried omeprazole and zofran in past  Doesn't think helpful    Hyperactive bowel sounds  Peppermint-side effect intolerance    Hypermobile joints  H/O Knee dislocation  Mom with hypermobile joints    Nausea  Associated symptoms include headaches and nausea.   Diarrhea  Associated symptoms include headaches and nausea.   Headache   Associated symptoms include nausea.   History of Present Illness       Reason for visit:  Consistent recurring nausea, acidic bowl movements, and intolerance to heat & cold (dizzy, lightheaded, nauseous over 80, shivering under 70), recurring migraines causing nausea  Symptoms include:  Nausea  Symptom progression:  Staying the same  Had these symptoms before:  Yes  Has tried/received treatment for these symptoms:  Yes  Previous treatment was successful:  No  What makes it worse:  Eating or drinking anything, moving  What makes it better:  Sitting still, sleeping She is missing 1 dose(s) of medications per week.  She is not taking prescribed medications regularly due to remembering to take.                  Review of Systems  Constitutional, neuro, ENT, endocrine, pulmonary, cardiac, gastrointestinal, genitourinary, musculoskeletal, integument and psychiatric systems are negative, except as otherwise noted.      Objective    /78 (BP Location: Right arm, Patient Position: Sitting, Cuff Size: Adult Regular)   Pulse 67   Temp 98.4  F (36.9  C) (Tympanic)   Resp 16   Ht 1.645 m (5' 4.75\")   Wt 65.1 kg (143 lb 8 oz)   SpO2 97%   BMI 24.06 kg/m    Body mass index is 24.06 kg/m .  Physical Exam  Constitutional:       Appearance: Normal appearance. She is not ill-appearing.   HENT:      Head: Normocephalic and atraumatic.   Eyes:      Extraocular Movements: Extraocular movements intact.      Pupils: Pupils are equal, round, and reactive to light.   Neck:      Comments: No thyromegaly  Cardiovascular:      Rate and Rhythm: " Normal rate and regular rhythm.   Pulmonary:      Effort: Pulmonary effort is normal.      Breath sounds: Normal breath sounds.   Abdominal:      General: Abdomen is flat. Bowel sounds are increased.      Palpations: Abdomen is soft.      Tenderness: There is no abdominal tenderness. There is no guarding.   Musculoskeletal:      Cervical back: Neck supple. No tenderness.   Lymphadenopathy:      Cervical: No cervical adenopathy.   Skin:     General: Skin is warm and dry.   Neurological:      General: No focal deficit present.      Mental Status: She is alert and oriented to person, place, and time.      Cranial Nerves: No cranial nerve deficit.      Sensory: No sensory deficit.      Motor: No weakness.      Gait: Gait normal.      Deep Tendon Reflexes: Reflexes normal.   Psychiatric:         Mood and Affect: Mood normal.         Behavior: Behavior normal.            Results for orders placed or performed in visit on 05/12/25 (from the past 24 hours)   Comprehensive metabolic panel (BMP + Alb, Alk Phos, ALT, AST, Total. Bili, TP)   Result Value Ref Range    Sodium 139 135 - 145 mmol/L    Potassium 4.5 3.4 - 5.3 mmol/L    Carbon Dioxide (CO2) 23 22 - 29 mmol/L    Anion Gap 12 7 - 15 mmol/L    Urea Nitrogen 13.0 6.0 - 20.0 mg/dL    Creatinine 1.10 (H) 0.51 - 0.95 mg/dL    GFR Estimate 72 >60 mL/min/1.73m2    Calcium 9.6 8.8 - 10.4 mg/dL    Chloride 104 98 - 107 mmol/L    Glucose 85 70 - 99 mg/dL    Alkaline Phosphatase 70 40 - 150 U/L    AST 21 0 - 45 U/L    ALT 14 0 - 50 U/L    Protein Total 7.3 6.4 - 8.3 g/dL    Albumin 4.7 3.5 - 5.2 g/dL    Bilirubin Total 1.3 (H) <=1.2 mg/dL   Lipase   Result Value Ref Range    Lipase 29 13 - 60 U/L   CBC with Platelets & Differential    Narrative    The following orders were created for panel order CBC with Platelets & Differential.  Procedure                               Abnormality         Status                     ---------                               -----------          ------                     CBC with platelets and ...[[id number]]                      Final result                 Please view results for these tests on the individual orders.   CBC with platelets and differential   Result Value Ref Range    WBC Count 6.1 4.0 - 11.0 10e3/uL    RBC Count 4.59 3.80 - 5.20 10e6/uL    Hemoglobin 14.7 11.7 - 15.7 g/dL    Hematocrit 43.0 35.0 - 47.0 %    MCV 94 78 - 100 fL    MCH 32.0 26.5 - 33.0 pg    MCHC 34.2 31.5 - 36.5 g/dL    RDW 11.7 10.0 - 15.0 %    Platelet Count 192 150 - 450 10e3/uL    % Neutrophils 55 %    % Lymphocytes 34 %    % Monocytes 6 %    % Eosinophils 4 %    % Basophils 1 %    % Immature Granulocytes 0 %    Absolute Neutrophils 3.4 1.6 - 8.3 10e3/uL    Absolute Lymphocytes 2.1 0.8 - 5.3 10e3/uL    Absolute Monocytes 0.4 0.0 - 1.3 10e3/uL    Absolute Eosinophils 0.3 0.0 - 0.7 10e3/uL    Absolute Basophils 0.0 0.0 - 0.2 10e3/uL    Absolute Immature Granulocytes 0.0 <=0.4 10e3/uL           Signed Electronically by: ABNER Yoon CNP

## 2025-05-12 NOTE — PATIENT INSTRUCTIONS
If diarrhea resolves, can try magnesium for headaches.  Does have side effect of loose stool for some people    Recommend trying a probiotic supplement, avoiding dairy products X 2 weeks.      Elizabeth for nausea?    Recommend you continue mindfulness.    Consider meditation pardeep, there is one called NERVA as below for IBS and gut symptoms.             details… detailed exam

## 2025-05-13 ENCOUNTER — RESULTS FOLLOW-UP (OUTPATIENT)
Dept: FAMILY MEDICINE | Facility: CLINIC | Age: 25
End: 2025-05-13

## 2025-05-15 LAB
TTG IGA SER-ACNC: 0.2 U/ML
TTG IGG SER-ACNC: 0.7 U/ML

## 2025-07-16 ENCOUNTER — OFFICE VISIT (OUTPATIENT)
Dept: FAMILY MEDICINE | Facility: CLINIC | Age: 25
End: 2025-07-16
Payer: COMMERCIAL

## 2025-07-16 VITALS
RESPIRATION RATE: 16 BRPM | HEART RATE: 65 BPM | OXYGEN SATURATION: 97 % | DIASTOLIC BLOOD PRESSURE: 75 MMHG | TEMPERATURE: 98.3 F | BODY MASS INDEX: 24.13 KG/M2 | WEIGHT: 143.9 LBS | SYSTOLIC BLOOD PRESSURE: 110 MMHG

## 2025-07-16 DIAGNOSIS — R68.84 PAIN IN JAW NOT ORIGINATING IN TEMPOROMANDIBULAR JOINT: Primary | ICD-10-CM

## 2025-07-16 PROCEDURE — 3078F DIAST BP <80 MM HG: CPT | Performed by: STUDENT IN AN ORGANIZED HEALTH CARE EDUCATION/TRAINING PROGRAM

## 2025-07-16 PROCEDURE — 99213 OFFICE O/P EST LOW 20 MIN: CPT | Performed by: STUDENT IN AN ORGANIZED HEALTH CARE EDUCATION/TRAINING PROGRAM

## 2025-07-16 PROCEDURE — 3074F SYST BP LT 130 MM HG: CPT | Performed by: STUDENT IN AN ORGANIZED HEALTH CARE EDUCATION/TRAINING PROGRAM

## 2025-07-16 NOTE — PROGRESS NOTES
Assessment & Plan     Pain in jaw not originating in temporomandibular joint  Due to tender mass that is just anterior to the left ear, just posterior to the TMJ.  Feels consistent with a lymph node, though location slightly atypical.  No external otitis, AOM, dental infection, cellulitis, parotitis, or submandibular/cervical lymph nodes.  No concern for URI.  Unclear cause, but given she is otherwise well-appearing, okay to monitor.  NSAIDs/acetaminophen for pain as needed, can also try ice/heat.  She will check in with me via my chart tomorrow or Friday if she has concerns for progression.      Subjective   Karen is a 25 year old, presenting for the following health issues:  Ear Problem (Pt is having pain in the left ear where the jaw line and ear meet. She states it started yesterday. This morning she states the pain got worse and hurts when she moves her jaw. She states it's tender to the touch and hurts to lay on that side,)        7/16/2025     3:49 PM   Additional Questions   Roomed by [place]     Ear Problem    History of Present Illness       Reason for visit:  Pain in ear where jaw line and ear meet. It feels like it may be swollen, is tender to the touch and feels like there is a hard lump. Also starting to hurt with some jaw movements  Symptom onset:  1-3 days ago  Symptoms include:  Pain, tender to the touch, swollen, possible hard lump  Symptom intensity:  Moderate  Symptom progression:  Worsening  Had these symptoms before:  No  What makes it worse:  Pressure on that side (laying with that side of face down) She is missing 2 dose(s) of medications per week.  She is not taking prescribed medications regularly due to remembering to take.      Not feeling ill or like she is coming down with something  No recent piercings or injury to the area  Infrequent use of earbuds, just a few minutes a day  No history of this issue        Objective    /75   Pulse 65   Temp 98.3  F (36.8  C) (Tympanic)    Resp 16   Wt 65.3 kg (143 lb 14.4 oz)   SpO2 97%   BMI 24.13 kg/m    Body mass index is 24.13 kg/m .  Physical Exam   Well-appearing  Just posterior to TMJ on the left there is a solid/hard feeling subcentimeter tender swelling under the skin, without overlying erythema, slightly mobile  Normal bilateral external ear, ear canals and TMs.  No external otitis or otitis media  No parotid tenderness or swelling, no cervical or submandibular lymph nodes  No gingival swelling or dental abscess, mucous membranes moist              Signed Electronically by: Shira You MD